# Patient Record
Sex: FEMALE | Race: WHITE | NOT HISPANIC OR LATINO | Employment: FULL TIME | URBAN - METROPOLITAN AREA
[De-identification: names, ages, dates, MRNs, and addresses within clinical notes are randomized per-mention and may not be internally consistent; named-entity substitution may affect disease eponyms.]

---

## 2017-01-11 ENCOUNTER — OFFICE VISIT (OUTPATIENT)
Dept: FAMILY MEDICINE | Facility: CLINIC | Age: 27
End: 2017-01-11
Payer: COMMERCIAL

## 2017-01-11 VITALS
HEART RATE: 70 BPM | HEIGHT: 67 IN | OXYGEN SATURATION: 100 % | DIASTOLIC BLOOD PRESSURE: 63 MMHG | WEIGHT: 157 LBS | TEMPERATURE: 97.7 F | BODY MASS INDEX: 24.64 KG/M2 | SYSTOLIC BLOOD PRESSURE: 111 MMHG

## 2017-01-11 DIAGNOSIS — M54.41 ACUTE BILATERAL LOW BACK PAIN WITH BILATERAL SCIATICA: Primary | ICD-10-CM

## 2017-01-11 DIAGNOSIS — M54.42 ACUTE BILATERAL LOW BACK PAIN WITH BILATERAL SCIATICA: Primary | ICD-10-CM

## 2017-01-11 PROBLEM — Z13.6 CARDIOVASCULAR SCREENING; LDL GOAL LESS THAN 160: Status: ACTIVE | Noted: 2017-01-11

## 2017-01-11 PROCEDURE — 99203 OFFICE O/P NEW LOW 30 MIN: CPT | Performed by: FAMILY MEDICINE

## 2017-01-11 RX ORDER — ESCITALOPRAM OXALATE 10 MG/1
10 TABLET ORAL DAILY
Refills: 1 | COMMUNITY
Start: 2016-11-18 | End: 2017-12-26

## 2017-01-11 RX ORDER — CYCLOBENZAPRINE HCL 10 MG
10 TABLET ORAL 3 TIMES DAILY PRN
Qty: 20 TABLET | Refills: 1 | Status: SHIPPED | OUTPATIENT
Start: 2017-01-11 | End: 2017-12-26

## 2017-01-11 RX ORDER — NAPROXEN 500 MG/1
500 TABLET ORAL 2 TIMES DAILY WITH MEALS
Qty: 30 TABLET | Refills: 1 | Status: SHIPPED | OUTPATIENT
Start: 2017-01-11 | End: 2017-12-26

## 2017-01-11 NOTE — NURSING NOTE
"Chief Complaint   Patient presents with     Back Pain     lower back pain        Initial /63 mmHg  Pulse 70  Temp(Src) 97.7  F (36.5  C) (Oral)  Ht 5' 7\" (1.702 m)  Wt 157 lb (71.215 kg)  BMI 24.58 kg/m2  SpO2 100%  LMP 01/06/2017 (Exact Date)  Breastfeeding? No Estimated body mass index is 24.58 kg/(m^2) as calculated from the following:    Height as of this encounter: 5' 7\" (1.702 m).    Weight as of this encounter: 157 lb (71.215 kg).  BP completed using cuff size: judy Ramirez      "

## 2017-01-11 NOTE — MR AVS SNAPSHOT
After Visit Summary   1/11/2017    Meredith Mello    MRN: [id number]           Patient Information     Date Of Birth          1990        Visit Information        Provider Department      1/11/2017 8:00 AM Rossana Ortiz MD [facility]        Today's Diagnoses     Acute bilateral low back pain with bilateral sciatica    -  1       Care Instructions    [facility]    If you have any questions regarding to your visit please contact your care team:       Team Red:   Clinic Hours Telephone Number   Dr. Rossana Bob, NP   7am-7pm  Monday - Thursday   7am-5pm  Fridays  [phone]  (Appointment scheduling available 24/7)    Questions about your visit?   Team Line  [phone]   Urgent Care - [place] and [facility] - 11am-9pm Monday-Friday Saturday-Sunday- 9am-5pm   [place] - 5pm-9pm Monday-Friday Saturday-Sunday- 9am-5pm  [phone] - [place]   [phone] - [place]       What options do I have for visits at the clinic other than the traditional office visit?  To expand how we care for you, many of our providers are utilizing electronic visits (e-visits) and telephone visits, when medically appropriate, for interactions with their patients rather than a visit in the clinic.   We also offer nurse visits for many medical concerns. Just like any other service, we will bill your insurance company for this type of visit based on time spent on the phone with your provider. Not all insurance companies cover these visits. Please check with your medical insurance if this type of visit is covered. You will be responsible for any charges that are not paid by your insurance.      E-visits via [employer]:  generally incur a $35.00 fee.  Telephone visits:  Time spent on the phone: *charged based on time that is spent on the phone in increments of 10 minutes. Estimated cost:   5-10 mins $30.00   11-20 mins. $59.00    21-30 mins. $85.00     Use [employer] (secure email communication and access to your chart) to send your primary care provider a message or make an appointment. Ask someone on your Team how to sign up for [employer].  For a Price Quote for your services, please call our Consumer Price Line at [phone].      As always, Thank you for trusting us with your health care needs!  Discharged by Zoey Cash MA.          Follow-ups after your visit        Additional Services     [facility] PT, HAND, AND CHIROPRACTIC REFERRAL       **This order will print in the [facility] Scheduling Office**    Physical Therapy, Hand Therapy and Chiropractic Care are available through:    *[facility]  *[facility]  *[facility]    Call one number to schedule at any of the above locations: [phone].    Your provider has referred you to: As Indicated:      Indication/Reason for Referral:    Onset of Illness:    Therapy Orders: Evaluate and Treat  Special Programs: None   Special Request: None    Parminder [place]      Additional Comments for the Therapist or Chiropractor:      Please be aware that coverage of these services is subject to the terms and limitations of your health insurance plan.  Call member services at your health plan with any benefit or coverage questions.      Please bring the following to your appointment:    *Your personal calendar for scheduling future appointments  *Comfortable clothing                  Follow-up notes from your care team     Return if symptoms worsen or fail to improve, for physical (fasting labs up to one week prior).      Who to contact     If you have questions or need follow up information about today's clinic visit or your schedule please contact [facility] directly at [phone].  Normal or non-critical lab and imaging results will be communicated to you by [employer], letter or phone within 4 business days after the clinic has received the  "results. If you do not hear from us within 7 days, please contact the clinic through [employer] or phone. If you have a critical or abnormal lab result, we will notify you by phone as soon as possible.  Submit refill requests through [employer] or call your pharmacy and they will forward the refill request to us. Please allow 3 business days for your refill to be completed.          Additional Information About Your Visit        [employer] Information     [employer] lets you send messages to your doctor, view your test results, renew your prescriptions, schedule appointments and more. To sign up, go to [email or web address] . Click on \"Log in\" on the left side of the screen, which will take you to the Welcome page. Then click on \"Sign up Now\" on the right side of the page.     You will be asked to enter the access code listed below, as well as some personal information. Please follow the directions to create your username and password.     Your access code is: [email or web address]  Expires: 2017  8:40 AM     Your access code will  in 90 days. If you need help or a new code, please call your [place] clinic or [phone].        Care EveryWhere ID     This is your Care EveryWhere ID. This could be used by other organizations to access your [place] medical records  [id number]        Your Vitals Were     Pulse Temperature Height    70 97.7  F (36.5  C) (Oral) 5' 7\" (1.702 m)    BMI (Body Mass Index) Pulse Oximetry Last Period    24.58 kg/m2 100% 2017 (Exact Date)    Breastfeeding?          No         Blood Pressure from Last 3 Encounters:   17 111/63    Weight from Last 3 Encounters:   17 157 lb (71.215 kg)              We Performed the Following     [facility] PT, HAND, AND CHIROPRACTIC REFERRAL          Today's Medication Changes          These changes are accurate as of: 17  8:40 AM.  If you have any questions, ask your nurse or doctor.               Start taking these medicines.        Dose/Directions "    cyclobenzaprine 10 MG tablet   Commonly known as:  FLEXERIL   Used for:  Acute bilateral low back pain with bilateral sciatica   Started by:  Rossana Ortiz MD        Dose:  10 mg   Take 1 tablet (10 mg) by mouth 3 times daily as needed for muscle spasms   Quantity:  20 tablet   Refills:  1       naproxen 500 MG tablet   Commonly known as:  NAPROSYN   Used for:  Acute bilateral low back pain with bilateral sciatica   Started by:  Rossana Ortiz MD        Dose:  500 mg   Take 1 tablet (500 mg) by mouth 2 times daily (with meals)   Quantity:  30 tablet   Refills:  1            Where to get your medicines      These medications were [facility] [place] [facility] [place] [facility] [address] [facility]  [address] [address]     Phone:  [phone]    - cyclobenzaprine 10 MG tablet  - naproxen 500 MG tablet             Primary Care Provider Office Phone # Fax #    Rossana Ortiz [phone]       [facility] [address] [place] [address]        Thank you!     Thank you for choosing [facility]  for your care. Our goal is always to provide you with excellent care. Hearing back from our patients is one way we can continue to improve our services. Please take a few minutes to complete the written survey that you may receive in the mail after your visit with us. Thank you!             Your Updated Medication List - Protect others around you: Learn how to safely use, store and throw away your medicines at www.disposemymeds.org.          This list is accurate as of: 1/11/17  8:40 AM.  Always use your most recent med list.                   Brand Name Dispense Instructions for use    cyclobenzaprine 10 MG tablet    FLEXERIL    20 tablet    Take 1 tablet (10 mg) by mouth 3 times daily as needed for muscle spasms       escitalopram 10 MG tablet    LEXAPRO     Take 10 mg by mouth daily       levonorgestrel 20 MCG/24HR IUD    MIRENA     1  each by Intrauterine route once       naproxen 500 MG tablet    NAPROSYN    30 tablet    Take 1 tablet (500 mg) by mouth 2 times daily (with meals)

## 2017-01-11 NOTE — PATIENT INSTRUCTIONS
[facility]    If you have any questions regarding to your visit please contact your care team:       Team Red:   Clinic Hours Telephone Number   Dr. Rossana Bob, NP   7am-7pm  Monday - Thursday   7am-5pm  Fridays  [phone]  (Appointment scheduling available 24/7)    Questions about your visit?   Team Line  [phone]   [facility] [place] [facility] 11am-9pm Monday-Friday Saturday-Sunday- 9am-5pm   [place] - 5pm-9pm Monday-Friday Saturday-Sunday- 9am-5pm  [phone] - [place]   [phone] - [place]       What options do I have for visits at the clinic other than the traditional office visit?  To expand how we care for you, many of our providers are utilizing electronic visits (e-visits) and telephone visits, when medically appropriate, for interactions with their patients rather than a visit in the clinic.   We also offer nurse visits for many medical concerns. Just like any other service, we will bill your insurance company for this type of visit based on time spent on the phone with your provider. Not all insurance companies cover these visits. Please check with your medical insurance if this type of visit is covered. You will be responsible for any charges that are not paid by your insurance.      E-visits via [employer]:  generally incur a $35.00 fee.  Telephone visits:  Time spent on the phone: *charged based on time that is spent on the phone in increments of 10 minutes. Estimated cost:   5-10 mins $30.00   11-20 mins. $59.00   21-30 mins. $85.00     Use [email or web address] (secure email communication and access to your chart) to send your primary care provider a message or make an appointment. Ask someone on your Team how to sign up for [employer].  For a Price Quote for your services, please call our Consumer Price Line at [phone].      As always, Thank you for trusting us with your health care needs!  Discharged  by Zoey Cash MA.

## 2017-01-11 NOTE — PROGRESS NOTES
"  SUBJECTIVE:                                                    Meredith Mello is a 26 year old female who presents to clinic today for the following health issues:    Back Pain      Duration: x 2 weeks         Specific cause: none    Description:   Location of pain: low back bilateral, near the base of the spine   Character of pain: sharp, stabbing and intermittent  Pain radiation:radiates into the right buttocks, radiates into the right leg, radiates into the right foot, radiates into the left buttocks, radiates into the left leg to knees   New numbness or weakness in legs, not attributed to pain:  no     Intensity: Currently 2/10    History:   Pain interferes with job: No  History of back problems: no prior back problems  Any previous MRI or X-rays: None  Sees a specialist for back pain:  No  Therapies tried without relief: ibuprofen, tylenol, massage, and stretching     Alleviating factors:   Improved by: none      Precipitating factors:  Worsened by: Lifting, Bending and Sitting    Functional and Psychosocial Screen (Parminder STarT Back):      Not performed today   Accompanying Signs & Symptoms:  Risk of Fracture:  None  Risk of Cauda Equina:  None  Risk of Infection:  None  Risk of Cancer:  None  Risk of Ankylosing Spondylitis:  Onset at age <35, male, AND morning back stiffness. no                  I have reviewed the patient's medical history in detail and updated the computerized patient record.     ROS:  C: NEGATIVE for fever, chills, change in weight  MUSCULOSKELETAL: see HPI   N: NEGATIVE for weakness, dizziness or paresthesias    OBJECTIVE:                                                    /63 mmHg  Pulse 70  Temp(Src) 97.7  F (36.5  C) (Oral)  Ht 5' 7\" (1.702 m)  Wt 157 lb (71.215 kg)  BMI 24.58 kg/m2  SpO2 100%  LMP 01/06/2017 (Exact Date)  Breastfeeding? No  Body mass index is 24.58 kg/(m^2).  GENERAL: healthy, alert and no distress  RESP: lungs clear to auscultation - no rales, rhonchi " or wheezes  CV: regular rate and rhythm, normal S1 S2, no S3 or S4, no murmur, click or rub, no peripheral edema and peripheral pulses strong  MS: no gross musculoskeletal defects noted, no edema  NEURO: Normal strength and tone, mentation intact and speech normal  PSYCH: mentation appears normal, affect normal/bright    Diagnostic Test Results:  none      ASSESSMENT/PLAN:                                                    (M54.42,  M54.41) Acute bilateral low back pain with bilateral sciatica  (primary encounter diagnosis)  Comment: suspect musculoligamentous cause   Plan: naproxen (NAPROSYN) 500 MG tablet,         cyclobenzaprine (FLEXERIL) 10 MG tablet, [facility]         PT, HAND, AND CHIROPRACTIC REFERRAL        Over the counter pain medication as needed, ice or heat as she finds helpful, avoidance of aggravating activities, and return for persistence for consideration of further imaging or referral.       See Patient Instructions    Rossana Ortiz MD  [facility]

## 2017-01-14 ENCOUNTER — THERAPY VISIT (OUTPATIENT)
Dept: PHYSICAL THERAPY | Facility: CLINIC | Age: 27
End: 2017-01-14
Payer: COMMERCIAL

## 2017-01-14 DIAGNOSIS — M54.41 ACUTE BILATERAL LOW BACK PAIN WITH RIGHT-SIDED SCIATICA: Primary | ICD-10-CM

## 2017-01-14 PROCEDURE — 97110 THERAPEUTIC EXERCISES: CPT | Mod: GP | Performed by: PHYSICAL THERAPIST

## 2017-01-14 PROCEDURE — 97161 PT EVAL LOW COMPLEX 20 MIN: CPT | Mod: GP | Performed by: PHYSICAL THERAPIST

## 2017-01-14 PROCEDURE — 97140 MANUAL THERAPY 1/> REGIONS: CPT | Mod: GP | Performed by: PHYSICAL THERAPIST

## 2017-01-14 NOTE — PROGRESS NOTES
[facility] Initial Evaluation  Subjective:    Meredith Mello is a 26 year old female with a lumbar condition.  Condition occurred with:  Insidious onset.  Condition occurred: for unknown reasons.  This is a new condition  Pain started Dec 28th. Drove 5 hours 2x over the course of a week prior to this and had noticed some discomfort during 2nd drive..    Patient reports pain:  Lower lumbar spine.  Radiates to:  Gluteals right, gluteals left, thigh right and thigh left.  Pain is described as sharp and is intermittent and reported as 3/10 and 7/10 (3/10 current; 7/10 after prolonged sitting).  Associated symptoms:  Loss of motion/stiffness. Pain is the same all the time.  Symptoms are exacerbated by bending and sitting and relieved by activity/movement and other (standing and walking around; no relief from NSAIDs, muscle relaxers, massager, or analgesics).  Since onset symptoms are unchanged.        General health as reported by patient is good.  Pertinent medical history includes:  Depression and migraines.  Medical allergies: no.  Other surgeries include:  No.    Current occupation is Music Therapist.  Patient is working in normal job without restrictions.  Primary job tasks include:  Prolonged sitting, prolonged standing and other (computer work; Driving).    Barriers include:  None as reported by the patient.    Red flags:  None as reported by the patient.                      Objective:    System         Lumbar/SI Evaluation  ROM:    AROM Lumbar:   Flexion:            To floor  Ext:                    Mod limitation +pain (most of motion through upper lumbar/lower thoracic); Min limitation ++pain when cued to extend through lower lumbar   Side Bend:        Left:  To knee    Right:  To knee  Rotation:           Left:  WNL    Right:  WNL  Side Glide:        Left:     Right:           Lumbar Myotomes:  Lumbar myotomes: WNL tested in sitting; (-) Toe walking, (-) Heel  "walking.                  Neural Tension/Mobility:  Neural tension wnl lumbar: (?) Slump- cervical flex increases low back sx when \"slumped\", no changed w/ Knee ext/DF.                SI joint/Sacrum:    (+) Gillet test R  (+) Active SLR R  (-) DOUGLAS B  (+) Torsion R    After MET:   (-) Gillet test  (-) Active SLR  (-) Torsion  Lumbar ext: Min limitation w/ extension through full lumbar spine, minimal pain decrease                                                     After repeated lumbar ext in prone: Ext WNL w/ minimal to no pain    General     ROS    Assessment/Plan:      Patient is a 26 year old female with lumbar complaints.    Patient has the following significant findings with corresponding treatment plan.                Diagnosis 1:  LBP  Pain -  manual therapy, self management, education, directional preference exercise and home program  Decreased ROM/flexibility - manual therapy, therapeutic exercise, therapeutic activity and home program  Decreased joint mobility - manual therapy, therapeutic exercise, therapeutic activity and home program  Decreased strength - therapeutic exercise, therapeutic activities and home program  Impaired muscle performance - neuro re-education and home program  Decreased function - therapeutic activities and home program    Therapy Evaluation Codes:   1) History comprised of:   Personal factors that impact the plan of care:      Profession.    Comorbidity factors that impact the plan of care are:      None.     Medications impacting care: None.  2) Examination of Body Systems comprised of:   Body structures and functions that impact the plan of care:      Lumbar spine.   Activity limitations that impact the plan of care are:      Driving, Reading/Computer work and Sitting.  3) Clinical presentation characteristics are:   Stable/Uncomplicated.  4) Decision-Making    Low complexity using standardized patient assessment instrument and/or measureable assessment of functional " outcome.  Cumulative Therapy Evaluation is: Low complexity.    Previous and current functional limitations:  (See Goal Flow Sheet for this information)    Short term and Long term goals: (See Goal Flow Sheet for this information)     Communication ability:  Patient appears to be able to clearly communicate and understand verbal and written communication and follow directions correctly.  Treatment Explanation - The following has been discussed with the patient:   RX ordered/plan of care  Anticipated outcomes  Possible risks and side effects  This patient would benefit from PT intervention to resume normal activities.   Rehab potential is excellent.    Frequency:  2 X month, once daily  Duration:  for 1-2 months  Discharge Plan:  Achieve all LTG.  Independent in home treatment program.  Reach maximal therapeutic benefit.    Please refer to the daily flowsheet for treatment today, total treatment time and time spent performing 1:1 timed codes.

## 2017-01-20 ENCOUNTER — OFFICE VISIT (OUTPATIENT)
Dept: FAMILY MEDICINE | Facility: CLINIC | Age: 27
End: 2017-01-20
Payer: COMMERCIAL

## 2017-01-20 VITALS
OXYGEN SATURATION: 100 % | RESPIRATION RATE: 14 BRPM | HEIGHT: 67 IN | TEMPERATURE: 96.8 F | DIASTOLIC BLOOD PRESSURE: 60 MMHG | HEART RATE: 90 BPM | WEIGHT: 150 LBS | SYSTOLIC BLOOD PRESSURE: 104 MMHG | BODY MASS INDEX: 23.54 KG/M2

## 2017-01-20 DIAGNOSIS — R82.90 NONSPECIFIC FINDING ON EXAMINATION OF URINE: ICD-10-CM

## 2017-01-20 DIAGNOSIS — R30.0 DYSURIA: ICD-10-CM

## 2017-01-20 DIAGNOSIS — R31.9 URINARY TRACT INFECTION WITH HEMATURIA, SITE UNSPECIFIED: Primary | ICD-10-CM

## 2017-01-20 DIAGNOSIS — N39.0 URINARY TRACT INFECTION WITH HEMATURIA, SITE UNSPECIFIED: Primary | ICD-10-CM

## 2017-01-20 LAB
ALBUMIN UR-MCNC: 100 MG/DL
APPEARANCE UR: CLEAR
BACTERIA #/AREA URNS HPF: ABNORMAL /HPF
BILIRUB UR QL STRIP: NEGATIVE
COLOR UR AUTO: YELLOW
GLUCOSE UR STRIP-MCNC: NEGATIVE MG/DL
HGB UR QL STRIP: ABNORMAL
KETONES UR STRIP-MCNC: 15 MG/DL
LEUKOCYTE ESTERASE UR QL STRIP: ABNORMAL
NITRATE UR QL: POSITIVE
NON-SQ EPI CELLS #/AREA URNS LPF: ABNORMAL /LPF
PH UR STRIP: 6 PH (ref 5–7)
RBC #/AREA URNS AUTO: ABNORMAL /HPF (ref 0–2)
SP GR UR STRIP: >1.03 (ref 1–1.03)
URN SPEC COLLECT METH UR: ABNORMAL
UROBILINOGEN UR STRIP-ACNC: 0.2 EU/DL (ref 0.2–1)
WBC #/AREA URNS AUTO: ABNORMAL /HPF (ref 0–2)
WBC CLUMPS #/AREA URNS HPF: PRESENT /HPF

## 2017-01-20 PROCEDURE — 87186 SC STD MICRODIL/AGAR DIL: CPT | Performed by: FAMILY MEDICINE

## 2017-01-20 PROCEDURE — 87086 URINE CULTURE/COLONY COUNT: CPT | Performed by: FAMILY MEDICINE

## 2017-01-20 PROCEDURE — 81001 URINALYSIS AUTO W/SCOPE: CPT | Performed by: FAMILY MEDICINE

## 2017-01-20 PROCEDURE — 99213 OFFICE O/P EST LOW 20 MIN: CPT | Performed by: FAMILY MEDICINE

## 2017-01-20 PROCEDURE — 87088 URINE BACTERIA CULTURE: CPT | Performed by: FAMILY MEDICINE

## 2017-01-20 RX ORDER — CIPROFLOXACIN 500 MG/1
500 TABLET, FILM COATED ORAL 2 TIMES DAILY
Qty: 14 TABLET | Refills: 0 | Status: SHIPPED | OUTPATIENT
Start: 2017-01-20 | End: 2017-06-14

## 2017-01-20 ASSESSMENT — PAIN SCALES - GENERAL: PAINLEVEL: NO PAIN (0)

## 2017-01-20 NOTE — PROGRESS NOTES
"  SUBJECTIVE:                                                    Meredith Mello is a 26 year old female who presents to clinic today for the following health issues:    URINARY TRACT SYMPTOMS      Duration: 2 weeks    Description  dysuria, frequency, urgency, odor, nocturia x 2, hesitancy, retention and back pain    Intensity:  moderate, severe    Accompanying signs and symptoms:  Fever/chills: YES  Flank pain YES  Nausea and vomiting: no   Vaginal symptoms: none  Abdominal/Pelvic Pain: YES    History  History of frequent UTI's: YES  History of kidney stones: no   Sexually Active: YES  Possibility of pregnancy: No    Precipitating or alleviating factors: None    Therapies tried and outcome: none   Outcome: none       Problem list and histories reviewed & adjusted, as indicated.  Additional history: as documented    Patient Active Problem List   Diagnosis     CARDIOVASCULAR SCREENING; LDL GOAL LESS THAN 160     SHERRI (generalized anxiety disorder)     Past Surgical History   Procedure Laterality Date     No history of surgery         Social History   Substance Use Topics     Smoking status: Never Smoker      Smokeless tobacco: Not on file     Alcohol Use: Yes     History reviewed. No pertinent family history.      ROS:  Constitutional, HEENT, cardiovascular, pulmonary and gi systems are negative, except as otherwise noted.    OBJECTIVE:                                                    /60 mmHg  Pulse 90  Temp(Src) 96.8  F (36  C) (Oral)  Resp 14  Ht 5' 7\" (1.702 m)  Wt 150 lb (68.04 kg)  BMI 23.49 kg/m2  SpO2 100%  LMP 01/06/2017  Breastfeeding? No  Body mass index is 23.49 kg/(m^2).  GENERAL: healthy, alert and no distress  HENT: ear canals and TM's normal, nose and mouth without ulcers or lesions  NECK: no adenopathy and thyroid normal to palpation  RESP: lungs clear to auscultation - no rales, rhonchi or wheezes  CV: regular rate and rhythm, no murmur, click or rub  ABDOMEN: Vague suprapubic " tenderness  MS: no gross musculoskeletal defects noted, no edema  BACK: No CVA tenderness  Diagnostic Test Results:     Results for orders placed or performed in visit on 01/20/17   *UA reflex to Microscopic and Culture ([facility] and [facility] (except [place] and [place])   Result Value Ref Range    Color Urine Yellow     Appearance Urine Clear     Glucose Urine Negative NEG mg/dL    Bilirubin Urine Negative NEG    Ketones Urine 15 (A) NEG mg/dL    Specific Gravity Urine >1.030 1.003 - 1.035    Blood Urine Large (A) NEG    pH Urine 6.0 5.0 - 7.0 pH    Protein Albumin Urine 100 (A) NEG mg/dL    Urobilinogen Urine 0.2 0.2 - 1.0 EU/dL    Nitrite Urine Positive (A) NEG    Leukocyte Esterase Urine Moderate (A) NEG    Source Midstream Urine    Urine Microscopic   Result Value Ref Range    WBC Urine  (A) 0 - 2 /HPF    RBC Urine 2-5 (A) 0 - 2 /HPF    WBC Clumps Present (A) NEG /HPF    Squamous Epithelial /LPF Urine Few FEW /LPF    Bacteria Urine Moderate (A) NEG /HPF          ASSESSMENT/PLAN:                                                    (N39.0,  R31.9) Urinary tract infection with hematuria, site unspecified  (primary encounter diagnosis)  Comment: UA consistent with UTI. Antibiotic treatment and preventive measures discussed.  Plan: ciprofloxacin (CIPRO) 500 MG tablet    (R30.0) Dysuria  Comment: UA consistent with UTI  Plan: *UA reflex to Microscopic and Culture         ([facility] and [facility] (except         [facility]), Urine Microscopic    (R82.90) Nonspecific finding on examination of urine  Plan: Urine Culture Aerobic Bacterial    Call or return to clinic prn if these symptoms worsen or fail to improve as anticipated 1 week.    Carter Bernabe MD  [facility]

## 2017-01-20 NOTE — NURSING NOTE
"Chief Complaint   Patient presents with     UTI       Initial /60 mmHg  Pulse 90  Temp(Src) 96.8  F (36  C) (Oral)  Resp 14  Ht 5' 7\" (1.702 m)  Wt 150 lb (68.04 kg)  BMI 23.49 kg/m2  SpO2 100%  LMP 01/06/2017  Breastfeeding? No Estimated body mass index is 23.49 kg/(m^2) as calculated from the following:    Height as of this encounter: 5' 7\" (1.702 m).    Weight as of this encounter: 150 lb (68.04 kg).  BP completed using cuff size: judy Hurley CMA      "

## 2017-01-20 NOTE — MR AVS SNAPSHOT
After Visit Summary   1/20/2017    Meredith Mello    MRN: [id number]           Patient Information     Date Of Birth          1990        Visit Information        Provider Department      1/20/2017 2:20 PM Carter Bernabe MD [facility]        Today's Diagnoses     Urinary tract infection with hematuria, site unspecified    -  1     Dysuria         Nonspecific finding on examination of urine           Care Instructions      Urinary Tract Infections in Women  Urinary tract infections (UTIs) are most often caused by bacteria (germs). These bacteria enter the urinary tract. The bacteria may come from outside the body. Or they may travel from the skin outside the rectum or vagina into the urethra. Female anatomy makes it easier for bacteria from the bowel to enter a woman s urinary tract, which is the most common source of UTI. This means women develop UTIs more often than men. Pain in or around the urinary tract is a common UTI symptom. But the only way to know for sure if you have a UTI for the health care provider to test your urine. The two tests that may be done are the urinalysis and urine culture.  Types of UTIs    Cystitis: A bladder infection (cystitis) is the most common UTI in women. You may have urgent or frequent urination. You may also have pain, burning when you urinate, and bloody urine.    Urethritis: This is an inflamed urethra, which is the tube that carries urine from the bladder to outside the body. You may have lower stomach or back pain. You may also have urgent or frequent urination.    Pyelonephritis: This is a kidney infection. If not treated, it can be serious and damage your kidneys. In severe cases, you may be hospitalized. You may have a fever and lower back pain.  Medications to treat a UTI  Most UTIs are treated with antibiotics. These kill the bacteria. The length of time you need to take them depends on the type of infection. It may be as short as  3 days. If you have repeated UTIs, a low-dose antibiotic may be needed for several months. Take antibiotics exactly as directed. Don t stop taking them until all of the medication is gone. If you stop taking the antibiotic too soon, the infection may not go away, and you may develop a resistance to the antibiotic. This can make it much harder to treat.  Lifestyle changes to treat and prevent UTIs  The lifestyle changes below will help get rid of your UTI. They may also help prevent future UTIs.    Drink plenty of fluids. This includes water, juice, or other caffeine-free drinks. Fluids help flush bacteria out of your body.    Empty your bladder. Always empty your bladder when you feel the urge to urinate. And always urinate before going to sleep. Urine that stays in your bladder can lead to infection. Try to urinate before and after sex as well.    Practice good personal hygiene. Wipe yourself from front to back after using the toilet. This helps keep bacteria from getting into the urethra.    Use condoms during sex. These help prevent UTIs caused by sexually transmitted bacteria. Also, avoid using spermicides during sex. These can increase the risk of UTIs. Choose other forms of birth control instead. For women who tend to get UTIs after sex, a low-dose of a preventive antibiotic may be used. Be sure to discuss this option with your health care provider.    Follow up with your health care provider as directed. He or she may test to make sure the infection has cleared. If necessary, additional treatment may be started.    5709-6777 [employer]. [address] [place] [address], [place] [address]. All rights reserved. This information is not intended as a substitute for professional medical care. Always follow your healthcare professional's instructions.        Urinary Tract Infections in Women  Urinary tract infections (UTIs) are most often caused by bacteria (germs). These bacteria enter the urinary tract. The  bacteria may come from outside the body. Or they may travel from the skin outside the rectum or vagina into the urethra. Female anatomy makes it easier for bacteria from the bowel to enter a woman s urinary tract, which is the most common source of UTI. This means women develop UTIs more often than men. Pain in or around the urinary tract is a common UTI symptom. But the only way to know for sure if you have a UTI for the health care provider to test your urine. The two tests that may be done are the urinalysis and urine culture.  Types of UTIs    Cystitis: A bladder infection (cystitis) is the most common UTI in women. You may have urgent or frequent urination. You may also have pain, burning when you urinate, and bloody urine.    Urethritis: This is an inflamed urethra, which is the tube that carries urine from the bladder to outside the body. You may have lower stomach or back pain. You may also have urgent or frequent urination.    Pyelonephritis: This is a kidney infection. If not treated, it can be serious and damage your kidneys. In severe cases, you may be hospitalized. You may have a fever and lower back pain.  Medications to treat a UTI  Most UTIs are treated with antibiotics. These kill the bacteria. The length of time you need to take them depends on the type of infection. It may be as short as 3 days. If you have repeated UTIs, a low-dose antibiotic may be needed for several months. Take antibiotics exactly as directed. Don t stop taking them until all of the medication is gone. If you stop taking the antibiotic too soon, the infection may not go away, and you may develop a resistance to the antibiotic. This can make it much harder to treat.  Lifestyle changes to treat and prevent UTIs  The lifestyle changes below will help get rid of your UTI. They may also help prevent future UTIs.    Drink plenty of fluids. This includes water, juice, or other caffeine-free drinks. Fluids help flush bacteria out of  your body.    Empty your bladder. Always empty your bladder when you feel the urge to urinate. And always urinate before going to sleep. Urine that stays in your bladder can lead to infection. Try to urinate before and after sex as well.    Practice good personal hygiene. Wipe yourself from front to back after using the toilet. This helps keep bacteria from getting into the urethra.    Use condoms during sex. These help prevent UTIs caused by sexually transmitted bacteria. Also, avoid using spermicides during sex. These can increase the risk of UTIs. Choose other forms of birth control instead. For women who tend to get UTIs after sex, a low-dose of a preventive antibiotic may be used. Be sure to discuss this option with your health care provider.    Follow up with your health care provider as directed. He or she may test to make sure the infection has cleared. If necessary, additional treatment may be started.    6062-3013 [employer]. [address] [place] [address]. All rights reserved. This information is not intended as a substitute for professional medical care. Always follow your healthcare professional's instructions.      [facility]    If you have any questions regarding to your visit please contact your care team:       Team Purple:   Clinic Hours Telephone Number   [address] Dr.   7am-7pm  Monday - Thursday   7am-5pm  Fridays  [phone]  (Appointment scheduling available 24/7)    Questions about your Visit?   Team Line:  [phone]   Urgent Care - [place] - 11am-9pm Monday-Friday Saturday-Sunday- 9am-5pm   [place] - 5pm-9pm Monday-Friday Saturday-Sunday- 9am-5pm  [phone] - [place]   [phone] - [place]       What options do I have for visits at the clinic other than the traditional office visit?  To expand how we care for you, many of our providers are  utilizing electronic visits (e-visits) and telephone visits, when medically appropriate, for interactions with their patients rather than a visit in the clinic.   We also offer nurse visits for many medical concerns. Just like any other service, we will bill your insurance company for this type of visit based on time spent on the phone with your provider. Not all insurance companies cover these visits. Please check with your medical insurance if this type of visit is covered. You will be responsible for any charges that are not paid by your insurance.      E-visits via TakWakhart:  generally incur a $35.00 fee.  Telephone visits:  Time spent on the phone: *charged based on time that is spent on the phone in increments of 10 minutes. Estimated cost:   5-10 mins $30.00   11-20 mins. $59.00   21-30 mins. $85.00     Use Knip (secure email communication and access to your chart) to send your primary care provider a message or make an appointment. Ask someone on your Team how to sign up for Knip.  For a Price Quote for your services, please call our OnetoOnetext Line at [phone].  As always, Thank you for trusting us with your health care needs!    Discharged by Alexia Hooker CMA          Follow-ups after your visit        Your next 10 appointments already scheduled     Jan 28, 2017  8:40 AM   [facility] with Amanda K Hilligoss, PT   [facility] [place] [facility] (SANDHYA FSANN JUNE)    [address]  [address]  [place] [address] [phone]              Who to contact     If you have questions or need follow up information about today's clinic visit or your schedule please contact [facility] directly at [phone].  Normal or non-critical lab and imaging results will be communicated to you by MyChart, letter or phone within 4 business days after the clinic has received the results. If you do not hear from us within 7 days, please contact the clinic through Snapteet or  "phone. If you have a critical or abnormal lab result, we will notify you by phone as soon as possible.  Submit refill requests through edupristine or call your pharmacy and they will forward the refill request to us. Please allow 3 business days for your refill to be completed.          Additional Information About Your Visit        [employer] Information     edupristine lets you send messages to your doctor, view your test results, renew your prescriptions, schedule appointments and more. To sign up, go to [email or web address] . Click on \"Log in\" on the left side of the screen, which will take you to the Welcome page. Then click on \"Sign up Now\" on the right side of the page.     You will be asked to enter the access code listed below, as well as some personal information. Please follow the directions to create your username and password.     Your access code is: [email or web address]  Expires: 2017  8:40 AM     Your access code will  in 90 days. If you need help or a new code, please call your [place] clinic or [phone].        Care EveryWhere ID     This is your Care EveryWhere ID. This could be used by other organizations to access your [place] medical records  [id number]        Your Vitals Were     Pulse Temperature Respirations    90 96.8  F (36  C) (Oral) 14    Height BMI (Body Mass Index) Pulse Oximetry    5' 7\" (1.702 m) 23.49 kg/m2 100%    Last Period Breastfeeding?       2017 No        Blood Pressure from Last 3 Encounters:   17 104/60   17 111/63    Weight from Last 3 Encounters:   17 150 lb (68.04 kg)   17 157 lb (71.215 kg)              We Performed the Following     *UA reflex to Microscopic and Culture ([facility] and [facility] (except [place] and [place])     Urine Culture Aerobic Bacterial     Urine Microscopic        Primary Care Provider Office Phone # Fax #    Rossana Ortiz [phone]       [facility] [address] [facility] [address]        Thank you!     Thank you for choosing [facility]  for your care. Our goal is always to provide you with excellent care. Hearing back from our patients is one way we can continue to improve our services. Please take a few minutes to complete the written survey that you may receive in the mail after your visit with us. Thank you!             Your Updated Medication List - Protect others around you: Learn how to safely use, store and throw away your medicines at www.disposemymeds.org.          This list is accurate as of: 1/20/17  3:02 PM.  Always use your most recent med list.                   Brand Name Dispense Instructions for use    cyclobenzaprine 10 MG tablet    FLEXERIL    20 tablet    Take 1 tablet (10 mg) by mouth 3 times daily as needed for muscle spasms       escitalopram 10 MG tablet    LEXAPRO     Take 10 mg by mouth daily       levonorgestrel 20 MCG/24HR IUD    MIRENA     1 each by Intrauterine route once       naproxen 500 MG tablet    NAPROSYN    30 tablet    Take 1 tablet (500 mg) by mouth 2 times daily (with meals)

## 2017-01-20 NOTE — PATIENT INSTRUCTIONS
Urinary Tract Infections in Women  Urinary tract infections (UTIs) are most often caused by bacteria (germs). These bacteria enter the urinary tract. The bacteria may come from outside the body. Or they may travel from the skin outside the rectum or vagina into the urethra. Female anatomy makes it easier for bacteria from the bowel to enter a woman s urinary tract, which is the most common source of UTI. This means women develop UTIs more often than men. Pain in or around the urinary tract is a common UTI symptom. But the only way to know for sure if you have a UTI for the health care provider to test your urine. The two tests that may be done are the urinalysis and urine culture.  Types of UTIs    Cystitis: A bladder infection (cystitis) is the most common UTI in women. You may have urgent or frequent urination. You may also have pain, burning when you urinate, and bloody urine.    Urethritis: This is an inflamed urethra, which is the tube that carries urine from the bladder to outside the body. You may have lower stomach or back pain. You may also have urgent or frequent urination.    Pyelonephritis: This is a kidney infection. If not treated, it can be serious and damage your kidneys. In severe cases, you may be hospitalized. You may have a fever and lower back pain.  Medications to treat a UTI  Most UTIs are treated with antibiotics. These kill the bacteria. The length of time you need to take them depends on the type of infection. It may be as short as 3 days. If you have repeated UTIs, a low-dose antibiotic may be needed for several months. Take antibiotics exactly as directed. Don t stop taking them until all of the medication is gone. If you stop taking the antibiotic too soon, the infection may not go away, and you may develop a resistance to the antibiotic. This can make it much harder to treat.  Lifestyle changes to treat and prevent UTIs  The lifestyle changes below will help get rid of your UTI. They  may also help prevent future UTIs.    Drink plenty of fluids. This includes water, juice, or other caffeine-free drinks. Fluids help flush bacteria out of your body.    Empty your bladder. Always empty your bladder when you feel the urge to urinate. And always urinate before going to sleep. Urine that stays in your bladder can lead to infection. Try to urinate before and after sex as well.    Practice good personal hygiene. Wipe yourself from front to back after using the toilet. This helps keep bacteria from getting into the urethra.    Use condoms during sex. These help prevent UTIs caused by sexually transmitted bacteria. Also, avoid using spermicides during sex. These can increase the risk of UTIs. Choose other forms of birth control instead. For women who tend to get UTIs after sex, a low-dose of a preventive antibiotic may be used. Be sure to discuss this option with your health care provider.    Follow up with your health care provider as directed. He or she may test to make sure the infection has cleared. If necessary, additional treatment may be started.    2244-1283 [employer] [address] [place], [place] [address]. All rights reserved. This information is not intended as a substitute for professional medical care. Always follow your healthcare professional's instructions.        Urinary Tract Infections in Women  Urinary tract infections (UTIs) are most often caused by bacteria (germs). These bacteria enter the urinary tract. The bacteria may come from outside the body. Or they may travel from the skin outside the rectum or vagina into the urethra. Female anatomy makes it easier for bacteria from the bowel to enter a woman s urinary tract, which is the most common source of UTI. This means women develop UTIs more often than men. Pain in or around the urinary tract is a common UTI symptom. But the only way to know for sure if you have a UTI for the health care provider to test your  urine. The two tests that may be done are the urinalysis and urine culture.  Types of UTIs    Cystitis: A bladder infection (cystitis) is the most common UTI in women. You may have urgent or frequent urination. You may also have pain, burning when you urinate, and bloody urine.    Urethritis: This is an inflamed urethra, which is the tube that carries urine from the bladder to outside the body. You may have lower stomach or back pain. You may also have urgent or frequent urination.    Pyelonephritis: This is a kidney infection. If not treated, it can be serious and damage your kidneys. In severe cases, you may be hospitalized. You may have a fever and lower back pain.  Medications to treat a UTI  Most UTIs are treated with antibiotics. These kill the bacteria. The length of time you need to take them depends on the type of infection. It may be as short as 3 days. If you have repeated UTIs, a low-dose antibiotic may be needed for several months. Take antibiotics exactly as directed. Don t stop taking them until all of the medication is gone. If you stop taking the antibiotic too soon, the infection may not go away, and you may develop a resistance to the antibiotic. This can make it much harder to treat.  Lifestyle changes to treat and prevent UTIs  The lifestyle changes below will help get rid of your UTI. They may also help prevent future UTIs.    Drink plenty of fluids. This includes water, juice, or other caffeine-free drinks. Fluids help flush bacteria out of your body.    Empty your bladder. Always empty your bladder when you feel the urge to urinate. And always urinate before going to sleep. Urine that stays in your bladder can lead to infection. Try to urinate before and after sex as well.    Practice good personal hygiene. Wipe yourself from front to back after using the toilet. This helps keep bacteria from getting into the urethra.    Use condoms during sex. These help prevent UTIs caused by sexually  transmitted bacteria. Also, avoid using spermicides during sex. These can increase the risk of UTIs. Choose other forms of birth control instead. For women who tend to get UTIs after sex, a low-dose of a preventive antibiotic may be used. Be sure to discuss this option with your health care provider.    Follow up with your health care provider as directed. He or she may test to make sure the infection has cleared. If necessary, additional treatment may be started.    4997-6693 [employer]. [address] [place] [address]. All rights reserved. This information is not intended as a substitute for professional medical care. Always follow your healthcare professional's instructions.      [facility]    If you have any questions regarding to your visit please contact your care team:       Team Purple:   Clinic Hours Telephone Number   [address] Dr.   7am-7pm  Monday - Thursday   7am-5pm  Fridays  [phone]  (Appointment scheduling available 24/7)    Questions about your Visit?   Team Line:  [phone]   Urgent Care - [place] [facility] - 11am-9pm Monday-Friday Saturday-Sunday- 9am-5pm   [place] - 5pm-9pm Monday-Friday Saturday-Sunday- 9am-5pm  [phone] - [place]   [phone] - [place]       What options do I have for visits at the clinic other than the traditional office visit?  To expand how we care for you, many of our providers are utilizing electronic visits (e-visits) and telephone visits, when medically appropriate, for interactions with their patients rather than a visit in the clinic.   We also offer nurse visits for many medical concerns. Just like any other service, we will bill your insurance company for this type of visit based on time spent on the phone with your provider. Not all insurance companies cover these visits. Please check with your medical insurance if this type  of visit is covered. You will be responsible for any charges that are not paid by your insurance.      E-visits via NearWoohart:  generally incur a $35.00 fee.  Telephone visits:  Time spent on the phone: *charged based on time that is spent on the phone in increments of 10 minutes. Estimated cost:   5-10 mins $30.00   11-20 mins. $59.00   21-30 mins. $85.00     Use Business Lab (secure email communication and access to your chart) to send your primary care provider a message or make an appointment. Ask someone on your Team how to sign up for Business Lab.  For a Price Quote for your services, please call our Consumer Price Line at [phone].  As always, Thank you for trusting us with your health care needs!    Discharged by Alexia Hooker CMA

## 2017-01-22 LAB
BACTERIA SPEC CULT: ABNORMAL
MICRO REPORT STATUS: ABNORMAL
MICROORGANISM SPEC CULT: ABNORMAL
SPECIMEN SOURCE: ABNORMAL

## 2017-03-21 NOTE — PROGRESS NOTES
Pt last seen in PT 01/14.  She was no show on 01/28 and no further appts are scheduled.  Consider note dated 01/14 to serve as final summary.

## 2017-06-14 ENCOUNTER — OFFICE VISIT (OUTPATIENT)
Dept: FAMILY MEDICINE | Facility: CLINIC | Age: 27
End: 2017-06-14
Payer: COMMERCIAL

## 2017-06-14 VITALS
RESPIRATION RATE: 21 BRPM | OXYGEN SATURATION: 100 % | HEIGHT: 67 IN | BODY MASS INDEX: 24.39 KG/M2 | WEIGHT: 155.4 LBS | TEMPERATURE: 97.1 F | SYSTOLIC BLOOD PRESSURE: 105 MMHG | DIASTOLIC BLOOD PRESSURE: 61 MMHG | HEART RATE: 60 BPM

## 2017-06-14 DIAGNOSIS — N30.00 ACUTE CYSTITIS WITHOUT HEMATURIA: Primary | ICD-10-CM

## 2017-06-14 LAB
ALBUMIN UR-MCNC: NEGATIVE MG/DL
APPEARANCE UR: ABNORMAL
BILIRUB UR QL STRIP: NEGATIVE
COLOR UR AUTO: YELLOW
GLUCOSE UR STRIP-MCNC: NEGATIVE MG/DL
HGB UR QL STRIP: ABNORMAL
KETONES UR STRIP-MCNC: NEGATIVE MG/DL
LEUKOCYTE ESTERASE UR QL STRIP: ABNORMAL
MICRO REPORT STATUS: NORMAL
NITRATE UR QL: NEGATIVE
NON-SQ EPI CELLS #/AREA URNS LPF: ABNORMAL /LPF
PH UR STRIP: 6 PH (ref 5–7)
RBC #/AREA URNS AUTO: ABNORMAL /HPF (ref 0–2)
SP GR UR STRIP: <=1.005 (ref 1–1.03)
SPECIMEN SOURCE: NORMAL
URN SPEC COLLECT METH UR: ABNORMAL
UROBILINOGEN UR STRIP-ACNC: 0.2 EU/DL (ref 0.2–1)
WBC #/AREA URNS AUTO: ABNORMAL /HPF (ref 0–2)
WET PREP SPEC: NORMAL

## 2017-06-14 PROCEDURE — 81001 URINALYSIS AUTO W/SCOPE: CPT | Performed by: NURSE PRACTITIONER

## 2017-06-14 PROCEDURE — 87210 SMEAR WET MOUNT SALINE/INK: CPT | Performed by: NURSE PRACTITIONER

## 2017-06-14 PROCEDURE — 99213 OFFICE O/P EST LOW 20 MIN: CPT | Performed by: NURSE PRACTITIONER

## 2017-06-14 RX ORDER — SULFAMETHOXAZOLE/TRIMETHOPRIM 800-160 MG
1 TABLET ORAL 2 TIMES DAILY
Qty: 6 TABLET | Refills: 0 | Status: SHIPPED | OUTPATIENT
Start: 2017-06-14 | End: 2017-06-17

## 2017-06-14 NOTE — NURSING NOTE
"Chief Complaint   Patient presents with     UTI       Initial /61 (BP Location: Left arm, Patient Position: Chair, Cuff Size: Adult Regular)  Pulse 60  Temp 97.1  F (36.2  C) (Oral)  Resp 21  Ht 5' 7\" (1.702 m)  Wt 155 lb 6.4 oz (70.5 kg)  SpO2 100%  Breastfeeding? No  BMI 24.34 kg/m2 Estimated body mass index is 24.34 kg/(m^2) as calculated from the following:    Height as of this encounter: 5' 7\" (1.702 m).    Weight as of this encounter: 155 lb 6.4 oz (70.5 kg).  Medication Reconciliation: complete     Clayton Ramirez      "

## 2017-06-14 NOTE — MR AVS SNAPSHOT
After Visit Summary   6/14/2017    Meredith Mello    MRN: [id number]           Patient Information     Date Of Birth          1990        Visit Information        Provider Department      6/14/2017 4:00 PM Suzie Bob APRN [facility]        Today's Diagnoses     Acute cystitis without hematuria    -  1      Care Instructions    [facility]    If you have any questions regarding to your visit please contact your care team:       Team Red:   Clinic Hours Telephone Number   Dr. Rossana Bob, NP   7am-7pm  Monday - Thursday   7am-5pm  Fridays  [phone]  (Appointment scheduling available 24/7)    Questions about your visit?   Team Line  [phone]   Urgent Care - [place] [facility] - 11am-9pm Monday-Friday Saturday-Sunday- 9am-5pm   [place] - 5pm-9pm Monday-Friday Saturday-Sunday- 9am-5pm  [phone] - [facility]  [phone] - [place]       What options do I have for visits at the clinic other than the traditional office visit?  To expand how we care for you, many of our providers are utilizing electronic visits (e-visits) and telephone visits, when medically appropriate, for interactions with their patients rather than a visit in the clinic.   We also offer nurse visits for many medical concerns. Just like any other service, we will bill your insurance company for this type of visit based on time spent on the phone with your provider. Not all insurance companies cover these visits. Please check with your medical insurance if this type of visit is covered. You will be responsible for any charges that are not paid by your insurance.      E-visits via ObjectFX:  generally incur a $35.00 fee.  Telephone visits:  Time spent on the phone: *charged based on time that is spent on the phone in increments of 10 minutes. Estimated cost:   5-10 mins $30.00   11-20 mins. $59.00   21-30  "mins. $85.00     Use Maximum Balance Foundationhart (secure email communication and access to your chart) to send your primary care provider a message or make an appointment. Ask someone on your Team how to sign up for Realvu Inct.  For a Price Quote for your services, please call our Consumer Price Line at [phone].      As always, Thank you for trusting us with your health care needs!  Clayton Ramirez            Follow-ups after your visit        Who to contact     If you have questions or need follow up information about today's clinic visit or your schedule please contact [facility] [place] directly at [phone].  Normal or non-critical lab and imaging results will be communicated to you by MyChart, letter or phone within 4 business days after the clinic has received the results. If you do not hear from us within 7 days, please contact the clinic through Realvu Inct or phone. If you have a critical or abnormal lab result, we will notify you by phone as soon as possible.  Submit refill requests through Tune Clout or call your pharmacy and they will forward the refill request to us. Please allow 3 business days for your refill to be completed.          Additional Information About Your Visit        Tune Clout Information     Realvu Inct lets you send messages to your doctor, view your test results, renew your prescriptions, schedule appointments and more. To sign up, go to [email or web address] . Click on \"Log in\" on the left side of the screen, which will take you to the Welcome page. Then click on \"Sign up Now\" on the right side of the page.     You will be asked to enter the access code listed below, as well as some personal information. Please follow the directions to create your username and password.     Your access code is: [email or web address]  Expires: 2017  4:57 PM     Your access code will  in 90 days. If you need help or a new code, please call your [facility] or [phone].        Care EveryWhere ID     This is " "your Care EveryWhere ID. This could be used by other organizations to access your [facility] medical records  [id number]        Your Vitals Were     Pulse Temperature Respirations Height Pulse Oximetry Breastfeeding?    60 97.1  F (36.2  C) (Oral) 21 5' 7\" (1.702 m) 100% No    BMI (Body Mass Index)                   24.34 kg/m2            Blood Pressure from Last 3 Encounters:   06/14/17 105/61   01/20/17 104/60   01/11/17 111/63    Weight from Last 3 Encounters:   06/14/17 155 lb 6.4 oz (70.5 kg)   01/20/17 150 lb (68 kg)   01/11/17 157 lb (71.2 kg)              We Performed the Following     *UA reflex to Microscopic and Culture ([place] [facility]] (except [place] and [place])     Urine Microscopic     Wet prep          Today's Medication Changes          These changes are accurate as of: 6/14/17  4:57 PM.  If you have any questions, ask your nurse or doctor.               Start taking these medicines.        Dose/Directions    sulfamethoxazole-trimethoprim 800-160 MG per tablet   Commonly known as:  BACTRIM DS/SEPTRA DS   Used for:  Acute cystitis without hematuria   Started by:  Suzie Bob APRN CNP        Dose:  1 tablet   Take 1 tablet by mouth 2 times daily for 3 days   Quantity:  6 tablet   Refills:  0            Where to get your medicines      These medications were sent to [facility]  [place], [place] - [address] [place] [address]  [address] [place] [address] [address], [place] [address]     Phone:  [phone]     sulfamethoxazole-trimethoprim 800-160 MG per tablet                Primary Care Provider Office Phone # Fax #    Rossana Ortiz [phone]       [address] [address]        Thank you!     Thank you for choosing [facility]  for your care. Our goal is always to provide you with excellent care. Hearing back from our patients is one way we can continue to improve our services. Please take a few " minutes to complete the written survey that you may receive in the mail after your visit with us. Thank you!             Your Updated Medication List - Protect others around you: Learn how to safely use, store and throw away your medicines at www.disposemymeds.org.          This list is accurate as of: 6/14/17  4:57 PM.  Always use your most recent med list.                   Brand Name Dispense Instructions for use    cyclobenzaprine 10 MG tablet    FLEXERIL    20 tablet    Take 1 tablet (10 mg) by mouth 3 times daily as needed for muscle spasms       escitalopram 10 MG tablet    LEXAPRO     Take 10 mg by mouth daily       levonorgestrel 20 MCG/24HR IUD    MIRENA     1 each by Intrauterine route once       naproxen 500 MG tablet    NAPROSYN    30 tablet    Take 1 tablet (500 mg) by mouth 2 times daily (with meals)       sulfamethoxazole-trimethoprim 800-160 MG per tablet    BACTRIM DS/SEPTRA DS    6 tablet    Take 1 tablet by mouth 2 times daily for 3 days

## 2017-06-14 NOTE — Clinical Note
Please abstract the following data from this visit with this patient into the appropriate field in Epic:  Pap smear done on this date: September 2016 (approximately), by this group: [facility], results were normal. Had prior abnormal paps so needs pap in 12 months Chlamydia testing done on this date: September 2016

## 2017-06-14 NOTE — PATIENT INSTRUCTIONS
[facility]    If you have any questions regarding to your visit please contact your care team:       Team Red:   Clinic Hours Telephone Number   Dr. Rossana Bob, NP   7am-7pm  Monday - Thursday   7am-5pm  Fridays  [phone]  (Appointment scheduling available 24/7)    Questions about your visit?   Team Line  [phone]   Urgent Care - [place] - 11am-9pm Monday-Friday Saturday-Sunday- 9am-5pm   [place] - 5pm-9pm Monday-Friday Saturday-Sunday- 9am-5pm  [phone] - [place]   [phone] - [place]       What options do I have for visits at the clinic other than the traditional office visit?  To expand how we care for you, many of our providers are utilizing electronic visits (e-visits) and telephone visits, when medically appropriate, for interactions with their patients rather than a visit in the clinic.   We also offer nurse visits for many medical concerns. Just like any other service, we will bill your insurance company for this type of visit based on time spent on the phone with your provider. Not all insurance companies cover these visits. Please check with your medical insurance if this type of visit is covered. You will be responsible for any charges that are not paid by your insurance.      E-visits via Siamab Therapeutics:  generally incur a $35.00 fee.  Telephone visits:  Time spent on the phone: *charged based on time that is spent on the phone in increments of 10 minutes. Estimated cost:   5-10 mins $30.00   11-20 mins. $59.00   21-30 mins. $85.00     Use Quick Keyt (secure email communication and access to your chart) to send your primary care provider a message or make an appointment. Ask someone on your Team how to sign up for [employer].  For a Price Quote for your services, please call our Consumer Price Line at [phone].      As always, Thank you for trusting us with your health care needs!  Clayton PRIEST  [place]

## 2017-06-14 NOTE — PROGRESS NOTES
"  SUBJECTIVE:                                                    Meredith Mello is a 26 year old female who presents to clinic today for the following health issues:    URINARY TRACT SYMPTOMS      Duration: Was treated twice in the last 6 months, 2 -3 weeks ago symptoms have returned      Description    dysuria, frequency, urgency, hesitancy     Intensity:  mild    Accompanying signs and symptoms:  Fever/chills: no   Flank pain no   Nausea and vomiting: no   Vaginal symptoms: itching  Abdominal/Pelvic Pain: no     History  History of frequent UTI's: YES  History of kidney stones: no   Sexually Active: YES  Possibility of pregnancy: No    Precipitating or alleviating factors: Cipro     Therapies tried and outcome: increase fluid intake   Outcome: none      Does have some mild vaginal itching.   Sexually active with 1 male partner for the last 7-8 months, does not necessarily note a correlate with intercourse.    Problem list and histories reviewed & adjusted, as indicated.  Additional history: as documented    Patient Active Problem List   Diagnosis     CARDIOVASCULAR SCREENING; LDL GOAL LESS THAN 160     SHERRI (generalized anxiety disorder)     Past Surgical History:   Procedure Laterality Date     NO HISTORY OF SURGERY         Social History   Substance Use Topics     Smoking status: Never Smoker     Smokeless tobacco: Not on file     Alcohol use Yes     History reviewed. No pertinent family history.        Reviewed and updated as needed this visit by clinical staff  Tobacco  Allergies  Meds  Med Hx  Surg Hx  Fam Hx  Soc Hx      Reviewed and updated as needed this visit by Provider         ROS:  Constitutional, gi and gu systems are negative, except as otherwise noted.    OBJECTIVE:                                                    /61 (BP Location: Left arm, Patient Position: Chair, Cuff Size: Adult Regular)  Pulse 60  Temp 97.1  F (36.2  C) (Oral)  Resp 21  Ht 5' 7\" (1.702 m)  Wt 155 lb 6.4 oz " (70.5 kg)  SpO2 100%  Breastfeeding? No  BMI 24.34 kg/m2  Body mass index is 24.34 kg/(m^2).  GENERAL: healthy, alert and no distress  ABDOMEN: soft, nontender, no hepatosplenomegaly, no masses and bowel sounds normal  BACK: no CVA tenderness, no paralumbar tenderness    Diagnostic Test Results:  Results for orders placed or performed in visit on 06/14/17   *UA reflex to Microscopic and Culture ([facility] (except [place] and [place])   Result Value Ref Range    Color Urine Yellow     Appearance Urine Slightly Cloudy     Glucose Urine Negative NEG mg/dL    Bilirubin Urine Negative NEG    Ketones Urine Negative NEG mg/dL    Specific Gravity Urine <=1.005 1.003 - 1.035    Blood Urine Trace (A) NEG    pH Urine 6.0 5.0 - 7.0 pH    Protein Albumin Urine Negative NEG mg/dL    Urobilinogen Urine 0.2 0.2 - 1.0 EU/dL    Nitrite Urine Negative NEG    Leukocyte Esterase Urine Trace (A) NEG    Source Midstream Urine    Urine Microscopic   Result Value Ref Range    WBC Urine 2-5 (A) 0 - 2 /HPF    RBC Urine 2-5 (A) 0 - 2 /HPF    Squamous Epithelial /LPF Urine Few FEW /LPF   Wet prep   Result Value Ref Range    Specimen Description Vagina     Wet Prep       No Trichomonas seen  No clue cells seen  No yeast seen  (Note)  MANY BACTERIA AND WBC SEEN.  SELF COLLECT.      Micro Report Status FINAL 06/14/2017         ASSESSMENT/PLAN:                                                        1. Acute cystitis without hematuria  Symptoms similar to prior infections so will start antibiotics.  Void after intercourse- possibly postcoital?   - *UA reflex to Microscopic and Culture ([place] and [facility] (except [place] and [place])  - Wet prep  - sulfamethoxazole-trimethoprim (BACTRIM DS/SEPTRA DS) 800-160 MG per tablet; Take 1 tablet by mouth 2 times daily for 3 days  Dispense: 6 tablet; Refill: 0    Follow up as needed    SHAYLA Ozuna [facility] [place]

## 2017-12-26 ENCOUNTER — OFFICE VISIT (OUTPATIENT)
Dept: FAMILY MEDICINE | Facility: CLINIC | Age: 27
End: 2017-12-26
Payer: COMMERCIAL

## 2017-12-26 VITALS
HEIGHT: 66 IN | WEIGHT: 148.5 LBS | SYSTOLIC BLOOD PRESSURE: 122 MMHG | OXYGEN SATURATION: 99 % | DIASTOLIC BLOOD PRESSURE: 72 MMHG | TEMPERATURE: 97 F | BODY MASS INDEX: 23.87 KG/M2 | HEART RATE: 78 BPM

## 2017-12-26 DIAGNOSIS — Z11.3 ROUTINE SCREENING FOR STI (SEXUALLY TRANSMITTED INFECTION): ICD-10-CM

## 2017-12-26 DIAGNOSIS — Z00.00 ROUTINE GENERAL MEDICAL EXAMINATION AT A HEALTH CARE FACILITY: Primary | ICD-10-CM

## 2017-12-26 DIAGNOSIS — Z23 NEED FOR TDAP VACCINATION: ICD-10-CM

## 2017-12-26 DIAGNOSIS — G47.9 SLEEP DISTURBANCE: ICD-10-CM

## 2017-12-26 DIAGNOSIS — F41.1 GAD (GENERALIZED ANXIETY DISORDER): ICD-10-CM

## 2017-12-26 LAB
SPECIMEN SOURCE: NORMAL
WET PREP SPEC: NORMAL

## 2017-12-26 PROCEDURE — 87491 CHLMYD TRACH DNA AMP PROBE: CPT | Performed by: NURSE PRACTITIONER

## 2017-12-26 PROCEDURE — 87210 SMEAR WET MOUNT SALINE/INK: CPT | Performed by: NURSE PRACTITIONER

## 2017-12-26 PROCEDURE — 90715 TDAP VACCINE 7 YRS/> IM: CPT | Performed by: NURSE PRACTITIONER

## 2017-12-26 PROCEDURE — 87591 N.GONORRHOEAE DNA AMP PROB: CPT | Performed by: NURSE PRACTITIONER

## 2017-12-26 PROCEDURE — 86780 TREPONEMA PALLIDUM: CPT | Performed by: NURSE PRACTITIONER

## 2017-12-26 PROCEDURE — 86803 HEPATITIS C AB TEST: CPT | Performed by: NURSE PRACTITIONER

## 2017-12-26 PROCEDURE — 36415 COLL VENOUS BLD VENIPUNCTURE: CPT | Performed by: NURSE PRACTITIONER

## 2017-12-26 PROCEDURE — 99395 PREV VISIT EST AGE 18-39: CPT | Performed by: NURSE PRACTITIONER

## 2017-12-26 PROCEDURE — 87389 HIV-1 AG W/HIV-1&-2 AB AG IA: CPT | Performed by: NURSE PRACTITIONER

## 2017-12-26 RX ORDER — ESCITALOPRAM OXALATE 10 MG/1
10 TABLET ORAL DAILY
Qty: 90 TABLET | Refills: 3 | Status: SHIPPED | OUTPATIENT
Start: 2017-12-26 | End: 2018-05-29

## 2017-12-26 NOTE — PATIENT INSTRUCTIONS
[facility]    If you have any questions regarding to your visit please contact your care team:     Team Pink:   Clinic Hours Telephone Number   Internal Medicine:  Dr. [place] Webb NP       7am-7pm  Monday - Thursday   7am-5pm  Fridays  [phone]  (Appointment scheduling available 24/7)    Questions about your visit?  Team Line  [phone]   Urgent Care - [place] [facility] - 11am-9pm Monday-Friday Saturday-Sunday- 9am-5pm   [place] - 5pm-9pm Monday-Friday Saturday-Sunday- 9am-5pm  [phone] - [place]   [phone] - [place]       What options do I have for visits at the clinic other than the traditional office visit?  To expand how we care for you, many of our providers are utilizing electronic visits (e-visits) and telephone visits, when medically appropriate, for interactions with their patients rather than a visit in the clinic.   We also offer nurse visits for many medical concerns. Just like any other service, we will bill your insurance company for this type of visit based on time spent on the phone with your provider. Not all insurance companies cover these visits. Please check with your medical insurance if this type of visit is covered. You will be responsible for any charges that are not paid by your insurance.      E-visits via Fliptu:  generally incur a $35.00 fee.  Telephone visits:  Time spent on the phone: *charged based on time that is spent on the phone in increments of 10 minutes. Estimated cost:   5-10 mins $30.00   11-20 mins. $59.00   21-30 mins. $85.00   Use Kuldatt (secure email communication and access to your chart) to send your primary care provider a message or make an appointment. Ask someone on your Team how to sign up for Fliptu.    For a Price Quote for your services, please call our Consumer Price Line at [phone].    As always, Thank you for trusting us with your health care  needs!    Karyn REYES MA

## 2017-12-26 NOTE — MR AVS SNAPSHOT
After Visit Summary   12/26/2017    Meredith Mello    MRN: [id number]           Patient Information     Date Of Birth          1990        Visit Information        Provider Department      12/26/2017 4:00 PM Bianca Webb APRN [facility]        Today's Diagnoses     Routine general medical examination at a health care facility    -  1    Need for Tdap vaccination        Routine screening for STI (sexually transmitted infection)        Sleep disturbance        SHERRI (generalized anxiety disorder)          Care Instructions    [facility]    If you have any questions regarding to your visit please contact your care team:     Team Pink:   Clinic Hours Telephone Number   Internal Medicine:  Dr. [place] Webb, NP       7am-7pm  Monday - Thursday   7am-5pm  Fridays  [phone]  (Appointment scheduling available 24/7)    Questions about your visit?  Team Line  [phone]   [facility] [place] [facility] - 11am-9pm Monday-Friday Saturday-Sunday- 9am-5pm   [place] - 5pm-9pm Monday-Friday Saturday-Sunday- 9am-5pm  [phone] - [place]   [phone] - [place]       What options do I have for visits at the clinic other than the traditional office visit?  To expand how we care for you, many of our providers are utilizing electronic visits (e-visits) and telephone visits, when medically appropriate, for interactions with their patients rather than a visit in the clinic.   We also offer nurse visits for many medical concerns. Just like any other service, we will bill your insurance company for this type of visit based on time spent on the phone with your provider. Not all insurance companies cover these visits. Please check with your medical insurance if this type of visit is covered. You will be responsible for any charges that are not paid by your insurance.      E-visits via Folkstr:  generally  "incur a $35.00 fee.  Telephone visits:  Time spent on the phone: *charged based on time that is spent on the phone in increments of 10 minutes. Estimated cost:   5-10 mins $30.00   11-20 mins. $59.00   21-30 mins. $85.00   Use Emulation and Verification Engineeringhart (secure email communication and access to your chart) to send your primary care provider a message or make an appointment. Ask someone on your Team how to sign up for Advise Onlyt.    For a Price Quote for your services, please call our [place] Line at [phone].    As always, Thank you for trusting us with your health care needs!    Karyn REYES MA            Follow-ups after your visit        Who to contact     If you have questions or need follow up information about today's clinic visit or your schedule please contact [facility] directly at [phone].  Normal or non-critical lab and imaging results will be communicated to you by Emulation and Verification Engineeringhart, letter or phone within 4 business days after the clinic has received the results. If you do not hear from us within 7 days, please contact the clinic through Em[employer] Ver[employer] Engineeringhart or phone. If you have a critical or abnormal lab result, we will notify you by phone as soon as possible.  Submit refill requests through RegeneMed or call your pharmacy and they will forward the refill request to us. Please allow 3 business days for your refill to be completed.          Additional Information About Your Visit        RegeneMed Information     RegeneMed lets you send messages to your doctor, view your test results, renew your prescriptions, schedule appointments and more. To sign up, go to [email or web address] . Click on \"Log in\" on the left side of the screen, which will take you to the Welcome page. Then click on \"Sign up Now\" on the right side of the page.     You will be asked to enter the access code listed below, as well as some personal information. Please follow the directions to create your username and password.     Your access code is: " "[email or web address]  Expires: 3/26/2018  4:36 PM     Your access code will  in 90 days. If you need help or a new code, please call your [place] clinic or [phone].        Care EveryWhere ID     This is your Care EveryWhere ID. This could be used by other organizations to access your [place] medical records  [id number]        Your Vitals Were     Pulse Temperature Height Pulse Oximetry BMI (Body Mass Index)       78 97  F (36.1  C) (Oral) 5' 6.14\" (1.68 m) 99% 23.87 kg/m2        Blood Pressure from Last 3 Encounters:   17 122/72   17 105/61   17 104/60    Weight from Last 3 Encounters:   17 148 lb 8 oz (67.4 kg)   17 155 lb 6.4 oz (70.5 kg)   17 150 lb (68 kg)              We Performed the Following     Anti Treponema     Chlamydia trachomatis PCR     Hepatitis C antibody     HIV Antigen Antibody Combo     Neisseria gonorrhoeae PCR     TDAP VACCINE ([facility]ACEL)     Wet prep          Where to get your medicines      These medications were sent to [place] [address] [facility] [place] - [address]  [address] [place] [address]     Phone:  [phone]     escitalopram 10 MG tablet          Primary Care Provider Office Phone # Fax #    Rossana Ortiz [phone] [address] [place] [address] [address]  [place] [address]        Equal Access to Services     [facility] AH: Hadii aad ku hadasho Soomaali, waaxda luqadaha, qaybta kaalmada adeegyada, waxtez willis haybetito nicolas . So [facility] [phone].    ATENCIÓN: Si habla español, tiene a dsouza disposición servicios gratuitos de asistencia lingüística. Llame al [phone].    We comply with applicable federal civil rights laws and [place] laws. We do not discriminate on the basis of race, color, national origin, age, disability, sex, sexual orientation, or gender identity.            Thank you!     Thank you for choosing [facility]  for your care. Our goal is always to provide you with excellent " care. Hearing back from our patients is one way we can continue to improve our services. Please take a few minutes to complete the written survey that you may receive in the mail after your visit with us. Thank you!             Your Updated Medication List - Protect others around you: Learn how to safely use, store and throw away your medicines at www.disposemymeds.org.          This list is accurate as of: 12/26/17  4:36 PM.  Always use your most recent med list.                   Brand Name Dispense Instructions for use Diagnosis    escitalopram 10 MG tablet    LEXAPRO    90 tablet    Take 1 tablet (10 mg) by mouth daily    SHERRI (generalized anxiety disorder)       levonorgestrel 20 MCG/24HR IUD    MIRENA     1 each by Intrauterine route once

## 2017-12-26 NOTE — LETTER
[facility]  [address] [place] [address]    December 27, 2017    Meredith Mello  [address]  [place] [address]          Dear Meredith,    No vaginal bacterial infection.       If you have any questions please feel free to contact [phone] or myself via Luminatet.     Enclosed is a copy of your results.     Results for orders placed or performed in visit on 12/26/17   Wet prep   Result Value Ref Range    Specimen Description Vagina     Wet Prep No Trichomonas seen     Wet Prep No clue cells seen     Wet Prep No yeast seen     Wet Prep (Note)  MANY BACTERIA SEEN.          If you have any questions or concerns, please call myself or my nurse at [phone].      Sincerely,        Bianca Webb CNP/granados

## 2017-12-26 NOTE — PROGRESS NOTES
SUBJECTIVE:   CC: Meredith Mello is an 27 year old woman who presents for preventive health visit.     Physical   Annual:     Getting at least 3 servings of Calcium per day::  Yes    Bi-annual eye exam::  Yes    Dental care twice a year::  Yes    Sleep apnea or symptoms of sleep apnea::  Daytime drowsiness    Diet::  Regular (no restrictions)    Frequency of exercise::  2-3 days/week    Duration of exercise::  45-60 minutes    Taking medications regularly::  Yes    Medication side effects::  None    Additional concerns today::  YES            Patient requests STI screening.  She denies any symptoms.    Patient notes difficulty falling and staying asleep.  She denies napping and falls asleep and wakes at the same time each day.  She has tried lavender essential oils and melatonin once.  She does sleep with her cell phone in her room.    Anxiety-  Patient denies any side effects from lexapro.  She feel symptoms are well controlled.    Today's PHQ-2 Score:   PHQ-2 ( 1999 Pfizer) 12/26/2017   Q1: Little interest or pleasure in doing things 0   Q2: Feeling down, depressed or hopeless 0   PHQ-2 Score 0   Q1: Little interest or pleasure in doing things Not at all   Q2: Feeling down, depressed or hopeless Not at all   PHQ-2 Score 0       Abuse: Current or Past(Physical, Sexual or Emotional)- No  Do you feel safe in your environment - Yes    Social History   Substance Use Topics     Smoking status: Never Smoker     Smokeless tobacco: Never Used     Alcohol use Yes     Alcohol Use 12/26/2017   If you drink alcohol, do you typically have greater than 3 drinks per day OR greater than 7 drinks per week?   No   No flowsheet data found.      Reviewed orders with patient.  Reviewed health maintenance and updated orders accordingly - Yes  Labs reviewed in EPIC  BP Readings from Last 3 Encounters:   12/26/17 122/72   06/14/17 105/61   01/20/17 104/60    Wt Readings from Last 3 Encounters:   12/26/17 148 lb 8 oz (67.4 kg)  "  06/14/17 155 lb 6.4 oz (70.5 kg)   01/20/17 150 lb (68 kg)                  Patient Active Problem List   Diagnosis     CARDIOVASCULAR SCREENING; LDL GOAL LESS THAN 160     SHERRI (generalized anxiety disorder)     Past Surgical History:   Procedure Laterality Date     NO HISTORY OF SURGERY         Social History   Substance Use Topics     Smoking status: Never Smoker     Smokeless tobacco: Never Used     Alcohol use Yes     History reviewed. No pertinent family history.      No Known Allergies  No lab results found.           Mammogram not appropriate for this patient based on age.    Pertinent mammograms are reviewed under the imaging tab.  History of abnormal Pap smear: Last 3 Pap Results: No results found for: PAP    Reviewed and updated as needed this visit by clinical staff  Tobacco  Allergies  Meds  Problems  Med Hx  Surg Hx  Fam Hx  Soc Hx          Reviewed and updated as needed this visit by Provider  Allergies  Meds  Problems          Review of Systems  C: NEGATIVE for fever, chills, change in weight  I: NEGATIVE for worrisome rashes, moles or lesions  E: NEGATIVE for vision changes or irritation  ENT: NEGATIVE for ear, mouth and throat problems  R: NEGATIVE for significant cough or SOB  B: NEGATIVE for masses, tenderness or discharge  CV: NEGATIVE for chest pain, palpitations or peripheral edema  GI: NEGATIVE for nausea, abdominal pain, heartburn, or change in bowel habits  : NEGATIVE for unusual urinary or vaginal symptoms. Periods are regular.  M: NEGATIVE for significant arthralgias or myalgia  N: NEGATIVE for weakness, dizziness or paresthesias  P: NEGATIVE for changes in mood or affect     OBJECTIVE:   /72  Pulse 78  Temp 97  F (36.1  C) (Oral)  Ht 5' 6.14\" (1.68 m)  Wt 148 lb 8 oz (67.4 kg)  SpO2 99%  BMI 23.87 kg/m2  Physical Exam  GENERAL: healthy, alert and no distress  EYES: Eyes grossly normal to inspection, PERRL and conjunctivae and sclerae normal  HENT: ear canals and " TM's normal, nose and mouth without ulcers or lesions  NECK: no adenopathy, no asymmetry, masses, or scars and thyroid normal to palpation  RESP: lungs clear to auscultation - no rales, rhonchi or wheezes  BREAST: normal without masses, tenderness or nipple discharge and no palpable axillary masses or adenopathy  CV: regular rate and rhythm, normal S1 S2, no S3 or S4, no murmur, click or rub, no peripheral edema and peripheral pulses strong  ABDOMEN: soft, nontender, no hepatosplenomegaly, no masses and bowel sounds normal   (female): normal female external genitalia, normal urethral meatus  and normal cervix with IUD strings noted.  MS: no gross musculoskeletal defects noted, no edema    ASSESSMENT/PLAN:   1. Routine general medical examination at a health care facility      2. Need for Tdap vaccination    - TDAP VACCINE (ADACEL)    3. Routine screening for STI (sexually transmitted infection)    - Anti Treponema  - Chlamydia trachomatis PCR  - HIV Antigen Antibody Combo  - Neisseria gonorrhoeae PCR  - Wet prep  - Hepatitis C antibody    4. Sleep disturbance  Patient to retry melatonin 3 mg 2-3 hours before bed or over the counter tylenol pm.  She will also try removing her cell phone from her room.  Patient to contact clinic if no improvement.    5. SHERRI (generalized anxiety disorder)  Stable.  Continue current treatment plan and medications.    - escitalopram (LEXAPRO) 10 MG tablet; Take 1 tablet (10 mg) by mouth daily  Dispense: 90 tablet; Refill: 3    COUNSELING:  Reviewed preventive health counseling, as reflected in patient instructions       Regular exercise       Healthy diet/nutrition       Immunizations    Vaccinated for: TDAP          BP Screening:   Last 3 BP Readings:    BP Readings from Last 3 Encounters:   12/26/17 122/72   06/14/17 105/61   01/20/17 104/60       The following was recommended to the patient:  Re-screen BP within a year and recommended lifestyle modifications     reports that she has  "never smoked. She has never used smokeless tobacco.    Estimated body mass index is 23.87 kg/(m^2) as calculated from the following:    Height as of this encounter: 5' 6.14\" (1.68 m).    Weight as of this encounter: 148 lb 8 oz (67.4 kg).         Counseling Resources:  ATP IV Guidelines  Pooled Cohorts Equation Calculator  Breast Cancer Risk Calculator  FRAX Risk Assessment  ICSI Preventive Guidelines  Dietary Guidelines for Americans, 2010  USDA's MyPlate  ASA Prophylaxis  Lung CA Screening    SHAYLA Felix CNP  [facility] for HPI/ROS submitted by the patient on 12/26/2017   PHQ-2 Score: 0    "

## 2017-12-27 LAB
HCV AB SERPL QL IA: NONREACTIVE
HIV 1+2 AB+HIV1 P24 AG SERPL QL IA: NONREACTIVE

## 2017-12-27 NOTE — PROGRESS NOTES
Dear Meredith,    Your recent test results are attached.      No vaginal bacterial infection.      If you have any questions please feel free to contact [phone] or myself via [employer].    Sincerely,  Bianca Webb, CNP

## 2017-12-28 LAB
C TRACH DNA SPEC QL NAA+PROBE: NEGATIVE
N GONORRHOEA DNA SPEC QL NAA+PROBE: NEGATIVE
SPECIMEN SOURCE: NORMAL
SPECIMEN SOURCE: NORMAL
T PALLIDUM IGG+IGM SER QL: NEGATIVE

## 2018-03-12 ENCOUNTER — OFFICE VISIT (OUTPATIENT)
Dept: INTERNAL MEDICINE | Facility: CLINIC | Age: 28
End: 2018-03-12
Payer: COMMERCIAL

## 2018-03-12 VITALS
WEIGHT: 158 LBS | BODY MASS INDEX: 24.8 KG/M2 | OXYGEN SATURATION: 99 % | HEIGHT: 67 IN | DIASTOLIC BLOOD PRESSURE: 74 MMHG | HEART RATE: 60 BPM | TEMPERATURE: 98.2 F | SYSTOLIC BLOOD PRESSURE: 112 MMHG | RESPIRATION RATE: 20 BRPM

## 2018-03-12 DIAGNOSIS — J35.8 TONSIL ASYMMETRY: Primary | ICD-10-CM

## 2018-03-12 LAB
DEPRECATED S PYO AG THROAT QL EIA: NORMAL
SPECIMEN SOURCE: NORMAL

## 2018-03-12 PROCEDURE — 99213 OFFICE O/P EST LOW 20 MIN: CPT | Performed by: PHYSICIAN ASSISTANT

## 2018-03-12 PROCEDURE — 87880 STREP A ASSAY W/OPTIC: CPT | Performed by: PHYSICIAN ASSISTANT

## 2018-03-12 PROCEDURE — 87081 CULTURE SCREEN ONLY: CPT | Performed by: PHYSICIAN ASSISTANT

## 2018-03-12 ASSESSMENT — PAIN SCALES - GENERAL: PAINLEVEL: NO PAIN (0)

## 2018-03-12 NOTE — PROGRESS NOTES
"  SUBJECTIVE:   Meredith Mello is a 27 year old female who presents to clinic today for the following health issues:    Chief Complaint   Patient presents with     Throat Problem     Swollen tonsile on the Right side x 24 hours.      - patient presents to clinic for concerns of tonsil enlargement on the right side  - patient notes she locked looked in the back of her throat and noted symptoms   - she otherwise did not notice any symptoms  - onset: unknown, but looked yesterday   - patient notes she can kind of feel it when she swallows,  but it is not painful or difficult to swallow  - denies: pain, fever, or body aches   - sick contacts: work in [employer]     Problem list and histories reviewed & adjusted, as indicated.  Additional history: as documented      Reviewed and updated as needed this visit by clinical staff  Tobacco  Allergies  Meds  Problems       Reviewed and updated as needed this visit by Provider  Meds  Problems         OBJECTIVE:     /74  Pulse 60  Temp 98.2  F (36.8  C) (Oral)  Resp 20  Ht 5' 6.5\" (1.689 m)  Wt 158 lb (71.7 kg)  SpO2 99%  Breastfeeding? No  BMI 25.12 kg/m2  Body mass index is 25.12 kg/(m^2).  GENERAL: healthy, alert and no distress  HENT: normal cephalic/atraumatic and oral pharynx shows enlarged right tonsil that appears normal in color, but somewhat lumpy, and left tonsil is non-apparent on exam, no swelling above the tonsils, no erythema, no tonsil stones noted, and no exudates  NECK: bilateral anterior cervical adenopathy      ASSESSMENT/PLAN:     1. Tonsil asymmetry  - unknown etiology, based on asymmetry will refer to ENT for further eval and treatment   - OTOLARYNGOLOGY REFERRAL  - Strep, Rapid Screen    Patient agrees to above plan and all questions are answered.     Ashley Hernández PA-C  [facility]  "

## 2018-03-13 LAB
BACTERIA SPEC CULT: NORMAL
SPECIMEN SOURCE: NORMAL

## 2018-03-19 ENCOUNTER — OFFICE VISIT (OUTPATIENT)
Dept: OTOLARYNGOLOGY | Facility: CLINIC | Age: 28
End: 2018-03-19
Payer: COMMERCIAL

## 2018-03-19 VITALS
HEART RATE: 51 BPM | DIASTOLIC BLOOD PRESSURE: 71 MMHG | BODY MASS INDEX: 23.86 KG/M2 | RESPIRATION RATE: 12 BRPM | OXYGEN SATURATION: 100 % | SYSTOLIC BLOOD PRESSURE: 119 MMHG | HEIGHT: 67 IN | WEIGHT: 152 LBS

## 2018-03-19 DIAGNOSIS — J35.1 HYPERTROPHY OF TONSILS: ICD-10-CM

## 2018-03-19 DIAGNOSIS — J35.8 TONSIL ASYMMETRY: Primary | ICD-10-CM

## 2018-03-19 PROCEDURE — 99204 OFFICE O/P NEW MOD 45 MIN: CPT | Performed by: OTOLARYNGOLOGY

## 2018-03-19 RX ORDER — CHLORHEXIDINE GLUCONATE ORAL RINSE 1.2 MG/ML
15 SOLUTION DENTAL 2 TIMES DAILY
Qty: 210 ML | Refills: 1 | Status: SHIPPED | OUTPATIENT
Start: 2018-03-19 | End: 2018-03-26

## 2018-03-19 NOTE — LETTER
3/19/2018         RE: Meredith Mello  [address]  [place] [address]        Dear Colleague,    Thank you for referring your patient, Meredith Mello, to the [facility]. Please see a copy of my visit note below.    History of Present Illness - Meredith Mello is a very pleasant 27 year old female kindly referred to me by Ashley Hernández due to a tonsil issue.    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  On looking in, the RIGHT tonsil was very swollen, and she could feel a foreign body sensation.  However, the tonsil was not red or purulent at all. Prior to this, there had been no change in swallowing, solids or liquids.  No change in voice, no change in exercise tolerance.  No other changes in her health, no night sweats, no unplanned changes in weight.    No previous ENT surgery.    Past Medical History -   Patient Active Problem List   Diagnosis     CARDIOVASCULAR SCREENING; LDL GOAL LESS THAN 160     SHERRI (generalized anxiety disorder)       Current Medications -   Current Outpatient Prescriptions:      escitalopram (LEXAPRO) 10 MG tablet, Take 1 tablet (10 mg) by mouth daily, Disp: 90 tablet, Rfl: 3     levonorgestrel (MIRENA) 20 MCG/24HR IUD, 1 each by Intrauterine route once, Disp: , Rfl:     Allergies -   Allergies   Allergen Reactions     Gluten Meal Diarrhea and Rash       Social History -   Social History     Social History     Marital status: Single     Spouse name: N/A     Number of children: 0     Years of education: 16     Occupational History     music therapy  [employer]      Student     Social History Main Topics     Smoking status: Never Smoker     Smokeless tobacco: Never Used     Alcohol use Yes     Drug use: Yes     Special: Marijuana     Sexual activity: Yes     Partners: Male     Birth control/ protection: IUD     Other Topics Concern     None     Social History Narrative       Family History - No family  "history on file.    Review of Systems - As per HPI and PMHx, otherwise 10+ system review of the head and neck, and general constitution is negative.    Physical Exam  B/P: 119/71, T: Data Unavailable, P: 51, R: 12  Vitals: /71  Pulse 51  Resp 12  Ht 1.689 m (5' 6.5\")  Wt 68.9 kg (152 lb)  SpO2 100%  BMI 24.17 kg/m2  BMI= Body mass index is 24.17 kg/(m^2).    General - The patient is well nourished and well developed, and appears to have good nutritional status.  Alert and oriented to person and place, answers questions and cooperates with examination appropriately.   Head and Face - Normocephalic and atraumatic, with no gross asymmetry noted of the contour of the facial features.  The facial nerve is intact, with strong symmetric movements.  Voice and Breathing - The patient was breathing comfortably without the use of accessory muscles. There was no wheezing, stridor, or stertor.  The patients voice was clear and strong, and had appropriate pitch and quality.  Ears - The tympanic membranes are normal in appearance, bony landmarks are intact.  No retraction, perforation, or masses.  Normal mobility on valsalva maneuver, with no reports of dizziness on insuflation.  No fluid or purulence was seen in the external canal or the middle ear. No evidence of infection of the middle ear or external canal, cerumen was normal in appearance.  Eyes - Extraocular movements intact, and the pupils were reactive to light.  Sclera were not icteric or injected, conjunctiva were pink and moist.  Mouth - Examination of the oral cavity showed pink, healthy oral mucosa. No lesions or ulcerations noted.  The tongue was mobile and midline, and the dentition were in good condition.    Throat - The walls of the oropharynx were smooth, pink, moist, symmetric, and had no lesions or ulcerations.  The tonsillar pillars and soft palate were symmetric.  The uvula was midline on elevation.  The RIGHT tonsil is definitely large, about 3+, " and with a mirror I could see a deep crypt with a large purulent tonsil stone.  The LEFT tonsil is normal adult size, 1, and looks normal.  Neck - Normal midline excursion of the laryngotracheal complex during swallowing.  Full range of motion on passive movement.  Palpation of the occipital, submental, submandibular, internal jugular chain, and supraclavicular nodes did not demonstrate any abnormal lymph nodes or masses.  The carotid pulse was palpable bilaterally.  Palpation of the thyroid was soft and smooth, with no nodules or goiter appreciated.  The trachea was mobile and midline.  Nose - External contour is symmetric, no gross deflection or scars.  Nasal mucosa is pink and moist with no abnormal mucus.  The septum was midline and non-obstructive, turbinates of normal size and position.  No polyps, masses, or purulence noted on examination.      A/P - Meredith Mello is a 27 year old female  (J35.8) Tonsil asymmetry  (primary encounter diagnosis)  (J35.1) Hypertrophy of tonsils    Given the texture and appearance, as well as the history, I actually suspect an asymmetry is due to a small microabscess or tonsil stone causing irritation and acute hypertrophy.  I will treat with peridex for a week.  More importantly, follow up, one month.  If not resolved, or especially if bigger, then we will discuss tonsillectomy.    Again, thank you for allowing me to participate in the care of your patient.        Sincerely,        Alex Valentino MD

## 2018-03-19 NOTE — PROGRESS NOTES
History of Present Illness - Meredith Mello is a very pleasant 27 year old female kindly referred to me by Ashley Hernández due to a tonsil issue.    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  On looking in, the RIGHT tonsil was very swollen, and she could feel a foreign body sensation.  However, the tonsil was not red or purulent at all. Prior to this, there had been no change in swallowing, solids or liquids.  No change in voice, no change in exercise tolerance.  No other changes in her health, no night sweats, no unplanned changes in weight.    No previous ENT surgery.    Past Medical History -   Patient Active Problem List   Diagnosis     CARDIOVASCULAR SCREENING; LDL GOAL LESS THAN 160     SHERRI (generalized anxiety disorder)       Current Medications -   Current Outpatient Prescriptions:      escitalopram (LEXAPRO) 10 MG tablet, Take 1 tablet (10 mg) by mouth daily, Disp: 90 tablet, Rfl: 3     levonorgestrel (MIRENA) 20 MCG/24HR IUD, 1 each by Intrauterine route once, Disp: , Rfl:     Allergies -   Allergies   Allergen Reactions     Gluten Meal Diarrhea and Rash       Social History -   Social History     Social History     Marital status: Single     Spouse name: N/A     Number of children: 0     Years of education: 16     Occupational History     music therapy  [employer]      Student     Social History Main Topics     Smoking status: Never Smoker     Smokeless tobacco: Never Used     Alcohol use Yes     Drug use: Yes     Special: Marijuana     Sexual activity: Yes     Partners: Male     Birth control/ protection: IUD     Other Topics Concern     None     Social History Narrative       Family History - No family history on file.    Review of Systems - As per HPI and PMHx, otherwise 10+ system review of the head and neck, and general constitution is negative.    Physical Exam  B/P: 119/71, T: Data Unavailable, P: 51, R: 12  Vitals: /71  Pulse  "51  Resp 12  Ht 1.689 m (5' 6.5\")  Wt 68.9 kg (152 lb)  SpO2 100%  BMI 24.17 kg/m2  BMI= Body mass index is 24.17 kg/(m^2).    General - The patient is well nourished and well developed, and appears to have good nutritional status.  Alert and oriented to person and place, answers questions and cooperates with examination appropriately.   Head and Face - Normocephalic and atraumatic, with no gross asymmetry noted of the contour of the facial features.  The facial nerve is intact, with strong symmetric movements.  Voice and Breathing - The patient was breathing comfortably without the use of accessory muscles. There was no wheezing, stridor, or stertor.  The patients voice was clear and strong, and had appropriate pitch and quality.  Ears - The tympanic membranes are normal in appearance, bony landmarks are intact.  No retraction, perforation, or masses.  Normal mobility on valsalva maneuver, with no reports of dizziness on insuflation.  No fluid or purulence was seen in the external canal or the middle ear. No evidence of infection of the middle ear or external canal, cerumen was normal in appearance.  Eyes - Extraocular movements intact, and the pupils were reactive to light.  Sclera were not icteric or injected, conjunctiva were pink and moist.  Mouth - Examination of the oral cavity showed pink, healthy oral mucosa. No lesions or ulcerations noted.  The tongue was mobile and midline, and the dentition were in good condition.    Throat - The walls of the oropharynx were smooth, pink, moist, symmetric, and had no lesions or ulcerations.  The tonsillar pillars and soft palate were symmetric.  The uvula was midline on elevation.  The RIGHT tonsil is definitely large, about 3+, and with a mirror I could see a deep crypt with a large purulent tonsil stone.  The LEFT tonsil is normal adult size, 1, and looks normal.  Neck - Normal midline excursion of the laryngotracheal complex during swallowing.  Full range of " motion on passive movement.  Palpation of the occipital, submental, submandibular, internal jugular chain, and supraclavicular nodes did not demonstrate any abnormal lymph nodes or masses.  The carotid pulse was palpable bilaterally.  Palpation of the thyroid was soft and smooth, with no nodules or goiter appreciated.  The trachea was mobile and midline.  Nose - External contour is symmetric, no gross deflection or scars.  Nasal mucosa is pink and moist with no abnormal mucus.  The septum was midline and non-obstructive, turbinates of normal size and position.  No polyps, masses, or purulence noted on examination.      A/P - Meredith Mello is a 27 year old female  (J35.8) Tonsil asymmetry  (primary encounter diagnosis)  (J35.1) Hypertrophy of tonsils    Given the texture and appearance, as well as the history, I actually suspect an asymmetry is due to a small microabscess or tonsil stone causing irritation and acute hypertrophy.  I will treat with peridex for a week.  More importantly, follow up, one month.  If not resolved, or especially if bigger, then we will discuss tonsillectomy.

## 2018-03-19 NOTE — PATIENT INSTRUCTIONS
Scheduling Information  To schedule your CT/MRI scan, please contact [facility] at [phone] OR [facility] at [phone]    To schedule your Surgery, please contact our Specialty Schedulers at [phone]      ENT Clinic Locations Clinic Hours Telephone Number     [facility]  [address] [place] [address]   Monday:           1:00pm -- 5:00pm    Friday:              8:00am - 12:00pm   To schedule/reschedule an appointment with   Dr. Valentino,   please contact our   Specialty Scheduling Department at:     [phone]       [facility]  [address] [address] Tuesday:          8:00am -- 2:00pm         Urgent Care Locations Clinic Hours Telephone Numbers     [facility]  [address] [place] [address]     Monday-Friday:     11:00am - 9:00pm    Saturday-Sunday:  9:00am - 5:00pm   [phone]     [facility]  [address] [place] [address]     Monday-Friday:      5:00pm - 9:00pm     Saturday-Sunday:  9:00am - 5:00pm   [phone]

## 2018-03-19 NOTE — MR AVS SNAPSHOT
After Visit Summary   3/19/2018    Meredith Mello    MRN: [id number]           Patient Information     Date Of Birth          1990        Visit Information        Provider Department      3/19/2018 4:45 PM [facility]        Today's Diagnoses     Tonsil asymmetry    -  1    Hypertrophy of tonsils          Care Instructions    Scheduling Information  To schedule your CT/MRI scan, please contact [facility] at [phone] OR [facility] at [phone]    To schedule your Surgery, please contact our Specialty Schedulers at [phone]      ENT Clinic Locations Clinic Hours Telephone Number     [facility]  [address]  [place] [address]   Monday:           1:00pm -- 5:00pm    Friday:              8:00am - 12:00pm   To schedule/reschedule an appointment with   Dr. Valentino,   please contact our   Specialty Scheduling Department at:     [phone]       [facility] [place]  [address] [place] [address] Tuesday:          8:00am -- 2:00pm         Urgent Care Locations Clinic Hours Telephone Numbers     [facility] [place]  [address] [place] [address]     Monday-Friday:     11:00am - 9:00pm    Saturday-Sunday:  9:00am - 5:00pm   [phone]     [facility]  [address] [place] [address]     Monday-Friday:      5:00pm - 9:00pm     Saturday-Sunday:  9:00am - 5:00pm   [phone]                 Follow-ups after your visit        Your next 10 appointments already scheduled     Apr 20, 2018  8:45 AM CDT   Return Visit with Alex Valentino MD   [facility] ([facility])    [address] [place] [id number] [phone]              Who to contact     If you have questions or need follow up information about today's clinic visit or your schedule please contact [facility] directly at [phone].  Normal or non-critical lab and  "imaging results will be communicated to you by MyChart, letter or phone within 4 business days after the clinic has received the results. If you do not hear from us within 7 days, please contact the clinic through [employer] or phone. If you have a critical or abnormal lab result, we will notify you by phone as soon as possible.  Submit refill requests through [employer] or call your pharmacy and they will forward the refill request to us. Please allow 3 business days for your refill to be completed.          Additional Information About Your Visit        [employer] Information     [employer] lets you send messages to your doctor, view your test results, renew your prescriptions, schedule appointments and more. To sign up, go to [email or web address] . Click on \"Log in\" on the left side of the screen, which will take you to the Welcome page. Then click on \"Sign up Now\" on the right side of the page.     You will be asked to enter the access code listed below, as well as some personal information. Please follow the directions to create your username and password.     Your access code is: [email or web address]  Expires: 3/26/2018  5:36 PM     Your access code will  in 90 days. If you need help or a new code, please call your [facility] clinic or [phone].        Care EveryWhere ID     This is your Care EveryWhere ID. This could be used by other organizations to access your [facility] medical records  [id number]        Your Vitals Were     Pulse Respirations Height Pulse Oximetry BMI (Body Mass Index)       51 12 1.689 m (5' 6.5\") 100% 24.17 kg/m2        Blood Pressure from Last 3 Encounters:   18 119/71   18 112/74   17 122/72    Weight from Last 3 Encounters:   18 68.9 kg (152 lb)   18 71.7 kg (158 lb)   17 67.4 kg (148 lb 8 oz)              Today, you had the following     No orders found for display         Today's Medication Changes          These changes are accurate as of 3/19/18  5:10 " PM.  If you have any questions, ask your nurse or doctor.               Start taking these medicines.        Dose/Directions    chlorhexidine 0.12 % solution   Commonly known as:  PERIDEX   Used for:  Tonsil asymmetry, Hypertrophy of tonsils   Started by:  Alex Valentino MD        Dose:  15 mL   Swish and spit 15 mLs in mouth 2 times daily for 7 days   Quantity:  210 mL   Refills:  1            Where to get your medicines      These medications [place] [facility] [place] [address]  [address], [facility] [address]     Phone:  [phone]     chlorhexidine 0.12 % solution                Primary Care Provider Office Phone # Fax #    Rossana Ortiz [phone] [address] [facility] [address]        Equal Access to Services     [facility]: Hadii aad ku hadasho Soomaali, waaxda luqadaha, qaybta kaalmada adeegyada, brii willis hayaatata nicolas . So [facility] [phone].    ATENCIÓN: Si habla español, tiene a dsouza disposición servicios gratuitos de asistencia lingüística. Llame al [phone].    We comply with applicable federal civil rights laws and [place] laws. We do not discriminate on the basis of race, color, national origin, age, disability, sex, sexual orientation, or gender identity.            Thank you!     Thank you for choosing [facility]  for your care. Our goal is always to provide you with excellent care. Hearing back from our patients is one way we can continue to improve our services. Please take a few minutes to complete the written survey that you may receive in the mail after your visit with us. Thank you!             Your Updated Medication List - Protect others around you: Learn how to safely use, store and throw away your medicines at www.disposemymeds.org.          This list is accurate as of 3/19/18  5:10 PM.  Always use your most recent med list.                   Brand  Name Dispense Instructions for use Diagnosis    chlorhexidine 0.12 % solution    PERIDEX    210 mL    Swish and spit 15 mLs in mouth 2 times daily for 7 days    Tonsil asymmetry, Hypertrophy of tonsils       escitalopram 10 MG tablet    LEXAPRO    90 tablet    Take 1 tablet (10 mg) by mouth daily    SHERRI (generalized anxiety disorder)       levonorgestrel 20 MCG/24HR IUD    MIRENA     1 each by Intrauterine route once

## 2018-04-20 ENCOUNTER — TELEPHONE (OUTPATIENT)
Dept: OTOLARYNGOLOGY | Facility: CLINIC | Age: 28
End: 2018-04-20

## 2018-04-20 ENCOUNTER — OFFICE VISIT (OUTPATIENT)
Dept: OTOLARYNGOLOGY | Facility: CLINIC | Age: 28
End: 2018-04-20
Payer: COMMERCIAL

## 2018-04-20 VITALS
OXYGEN SATURATION: 100 % | HEART RATE: 76 BPM | DIASTOLIC BLOOD PRESSURE: 71 MMHG | BODY MASS INDEX: 23.54 KG/M2 | SYSTOLIC BLOOD PRESSURE: 111 MMHG | RESPIRATION RATE: 12 BRPM | HEIGHT: 67 IN | WEIGHT: 150 LBS

## 2018-04-20 DIAGNOSIS — J35.1 HYPERTROPHY OF TONSILS: ICD-10-CM

## 2018-04-20 DIAGNOSIS — J35.8 TONSILLAR MASS: ICD-10-CM

## 2018-04-20 DIAGNOSIS — J35.8 TONSIL ASYMMETRY: Primary | ICD-10-CM

## 2018-04-20 PROCEDURE — 99214 OFFICE O/P EST MOD 30 MIN: CPT | Performed by: OTOLARYNGOLOGY

## 2018-04-20 NOTE — TELEPHONE ENCOUNTER
04/20/2018 LEFT A MESSAGE FOR PATIENT TO CALL AND SCHEDULE, DR CHRIS IS WORKING PATIENT IN I HAVE CONFIRMED SURGERY WITH JOHNNA @[facility] FOR 04/30/2018 AT 11:00.[facility]

## 2018-04-20 NOTE — PROGRESS NOTES
History of Present Illness - Meredith Mello is a very pleasant 27 year old female here in follow up from 3/19/2018 referred to me by Ashley Hernández due to a tonsil issue.  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  On looking in, the RIGHT tonsil was very swollen, and she could feel a foreign body sensation.  However, the tonsil was not red or purulent at all. Prior to this, there had been no change in swallowing, solids or liquids.  No change in voice, no change in exercise tolerance.  No other changes in her health, no night sweats, no unplanned changes in weight.    No previous ENT surgery.    On exam, I found that the RIGHT tonsil was indeed extremely large, the LEFT was normal.  However, there was signficant exudative crypts and tonsil stones of the RIGHT side, and I placed her on peridex to try and clear this possible microabscess.  She is here in follow up.    Past Medical History -   Patient Active Problem List   Diagnosis     CARDIOVASCULAR SCREENING; LDL GOAL LESS THAN 160     SHERRI (generalized anxiety disorder)     Tonsil asymmetry       Current Medications -   Current Outpatient Prescriptions:      escitalopram (LEXAPRO) 10 MG tablet, Take 1 tablet (10 mg) by mouth daily, Disp: 90 tablet, Rfl: 3     levonorgestrel (MIRENA) 20 MCG/24HR IUD, 1 each by Intrauterine route once, Disp: , Rfl:     Allergies -   Allergies   Allergen Reactions     Gluten Meal Diarrhea and Rash       Social History -   Social History     Social History     Marital status: Single     Spouse name: N/A     Number of children: 0     Years of education: 16     Occupational History     music therapy  [employer]      Student     Social History Main Topics     Smoking status: Never Smoker     Smokeless tobacco: Never Used     Alcohol use Yes     Drug use: Yes     Special: Marijuana     Sexual activity: Yes     Partners: Male     Birth control/ protection: IUD     Other Topics Concern  "    None     Social History Narrative       Family History - No family history on file.    Review of Systems - As per HPI and PMHx, otherwise 10+ system review of the head and neck, and general constitution is negative.    Physical Exam  B/P: 119/71, T: Data Unavailable, P: 51, R: 12  Vitals: /71  Pulse 76  Resp 12  Ht 1.689 m (5' 6.5\")  Wt 68 kg (150 lb)  SpO2 100%  BMI 23.85 kg/m2  BMI= Body mass index is 23.85 kg/(m^2).    General - The patient is well nourished and well developed, and appears to have good nutritional status.  Alert and oriented to person and place, answers questions and cooperates with examination appropriately.   Head and Face - Normocephalic and atraumatic, with no gross asymmetry noted of the contour of the facial features.  The facial nerve is intact, with strong symmetric movements.  Voice and Breathing - The patient was breathing comfortably without the use of accessory muscles. There was no wheezing, stridor, or stertor.  The patients voice was clear and strong, and had appropriate pitch and quality.  Ears - The tympanic membranes are normal in appearance, bony landmarks are intact.  No retraction, perforation, or masses.  Normal mobility on valsalva maneuver, with no reports of dizziness on insuflation.  No fluid or purulence was seen in the external canal or the middle ear. No evidence of infection of the middle ear or external canal, cerumen was normal in appearance.  Eyes - Extraocular movements intact, and the pupils were reactive to light.  Sclera were not icteric or injected, conjunctiva were pink and moist.  Mouth - Examination of the oral cavity showed pink, healthy oral mucosa. No lesions or ulcerations noted.  The tongue was mobile and midline, and the dentition were in good condition.    Throat - The walls of the oropharynx were smooth, pink, moist, symmetric, and had no lesions or ulcerations.  The tonsillar pillars and soft palate were symmetric.  The uvula was " midline on elevation.  The RIGHT tonsil is definitely large, about 3+, and with a mirror there was no longer any purulence or erythma.  The LEFT tonsil is normal adult size, 1, and looks normal.  ON palpation, the RIGHT tonsil is slightly firm in the mid fossa.  Neck - Normal midline excursion of the laryngotracheal complex during swallowing.  Full range of motion on passive movement.  Palpation of the occipital, submental, submandibular, internal jugular chain, and supraclavicular nodes did not demonstrate any abnormal lymph nodes or masses.  The carotid pulse was palpable bilaterally.  Palpation of the thyroid was soft and smooth, with no nodules or goiter appreciated.  The trachea was mobile and midline.  Nose - External contour is symmetric, no gross deflection or scars.  Nasal mucosa is pink and moist with no abnormal mucus.  The septum was midline and non-obstructive, turbinates of normal size and position.  No polyps, masses, or purulence noted on examination.      A/P - Meredith Mello is a 27 year old female  (J35.8) Tonsil asymmetry  (primary encounter diagnosis)  (J35.1) Hypertrophy of tonsils    Given the texture and appearance, as well as the history and lack of any resolution, as well as a day of transient ear pain, I think we need to perform tonsillectomy to rule out malignancy.    Based on the physical exam and history, my recommendation is for tonsillectomy (without adenoidectomy).  The remainder of the visit was spent discussing the risks and benefits of tonsillectomy.  These included:  The risks of general anesthesia, bleeding, infection, possible need for emergency surgery to control bleeding, possible alteration of speech and swallowing, and even the possibility of continued throat problems following surgery.  They understood and wished to call in and schedule.

## 2018-04-20 NOTE — PATIENT INSTRUCTIONS
Scheduling Information  To schedule your CT/MRI scan, please contact [facility] at [phone] OR [facility] at [phone]    To schedule your Surgery, please contact our Specialty Schedulers at [phone]      ENT Clinic Locations Clinic Hours Telephone Number     [facility]  [address] [place] [address]   Monday:           1:00pm -- 5:00pm    Friday:              8:00am - 12:00pm   To schedule/reschedule an appointment with   Dr. Valentino,   please contact our   Specialty Scheduling Department at:     [phone]       [facility]  [address] [place] [address] Tuesday:          8:00am -- 2:00pm         Urgent Care Locations Clinic Hours Telephone Numbers     [facility]  [address] [place] [address]     Monday-Friday:     11:00am - 9:00pm    Saturday-Sunday:  9:00am - 5:00pm   [phone]     [facility]  [address] [place] [address]     Monday-Friday:      5:00pm - 9:00pm     Saturday-Sunday:  9:00am - 5:00pm   [phone]

## 2018-04-20 NOTE — LETTER
4/20/2018         RE: Meredith Mello  [address]  [facility] [address]        Dear Colleague,    Thank you for referring your patient, Meredith Mello, to the [facility]. Please see a copy of my visit note below.    History of Present Illness - Meredith Mello is a very pleasant 27 year old female here in follow up from 3/19/2018 referred to me by Ashley Hernández due to a tonsil issue.  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  On looking in, the RIGHT tonsil was very swollen, and she could feel a foreign body sensation.  However, the tonsil was not red or purulent at all. Prior to this, there had been no change in swallowing, solids or liquids.  No change in voice, no change in exercise tolerance.  No other changes in her health, no night sweats, no unplanned changes in weight.    No previous ENT surgery.    On exam, I found that the RIGHT tonsil was indeed extremely large, the LEFT was normal.  However, there was signficant exudative crypts and tonsil stones of the RIGHT side, and I placed her on peridex to try and clear this possible microabscess.  She is here in follow up.    Past Medical History -   Patient Active Problem List   Diagnosis     CARDIOVASCULAR SCREENING; LDL GOAL LESS THAN 160     SHERRI (generalized anxiety disorder)     Tonsil asymmetry       Current Medications -   Current Outpatient Prescriptions:      escitalopram (LEXAPRO) 10 MG tablet, Take 1 tablet (10 mg) by mouth daily, Disp: 90 tablet, Rfl: 3     levonorgestrel (MIRENA) 20 MCG/24HR IUD, 1 each by Intrauterine route once, Disp: , Rfl:     Allergies -   Allergies   Allergen Reactions     Gluten Meal Diarrhea and Rash       Social History -   Social History     Social History     Marital status: Single     Spouse name: N/A     Number of children: 0     Years of education: 16     Occupational History     music therapy  [employer]      Student     Social History  "Main Topics     Smoking status: Never Smoker     Smokeless tobacco: Never Used     Alcohol use Yes     Drug use: Yes     Special: Marijuana     Sexual activity: Yes     Partners: Male     Birth control/ protection: IUD     Other Topics Concern     None     Social History Narrative       Family History - No family history on file.    Review of Systems - As per HPI and PMHx, otherwise 10+ system review of the head and neck, and general constitution is negative.    Physical Exam  B/P: 119/71, T: Data Unavailable, P: 51, R: 12  Vitals: /71  Pulse 76  Resp 12  Ht 1.689 m (5' 6.5\")  Wt 68 kg (150 lb)  SpO2 100%  BMI 23.85 kg/m2  BMI= Body mass index is 23.85 kg/(m^2).    General - The patient is well nourished and well developed, and appears to have good nutritional status.  Alert and oriented to person and place, answers questions and cooperates with examination appropriately.   Head and Face - Normocephalic and atraumatic, with no gross asymmetry noted of the contour of the facial features.  The facial nerve is intact, with strong symmetric movements.  Voice and Breathing - The patient was breathing comfortably without the use of accessory muscles. There was no wheezing, stridor, or stertor.  The patients voice was clear and strong, and had appropriate pitch and quality.  Ears - The tympanic membranes are normal in appearance, bony landmarks are intact.  No retraction, perforation, or masses.  Normal mobility on valsalva maneuver, with no reports of dizziness on insuflation.  No fluid or purulence was seen in the external canal or the middle ear. No evidence of infection of the middle ear or external canal, cerumen was normal in appearance.  Eyes - Extraocular movements intact, and the pupils were reactive to light.  Sclera were not icteric or injected, conjunctiva were pink and moist.  Mouth - Examination of the oral cavity showed pink, healthy oral mucosa. No lesions or ulcerations noted.  The tongue was " mobile and midline, and the dentition were in good condition.    Throat - The walls of the oropharynx were smooth, pink, moist, symmetric, and had no lesions or ulcerations.  The tonsillar pillars and soft palate were symmetric.  The uvula was midline on elevation.  The RIGHT tonsil is definitely large, about 3+, and with a mirror there was no longer any purulence or erythma.  The LEFT tonsil is normal adult size, 1, and looks normal.  ON palpation, the RIGHT tonsil is slightly firm in the mid fossa.  Neck - Normal midline excursion of the laryngotracheal complex during swallowing.  Full range of motion on passive movement.  Palpation of the occipital, submental, submandibular, internal jugular chain, and supraclavicular nodes did not demonstrate any abnormal lymph nodes or masses.  The carotid pulse was palpable bilaterally.  Palpation of the thyroid was soft and smooth, with no nodules or goiter appreciated.  The trachea was mobile and midline.  Nose - External contour is symmetric, no gross deflection or scars.  Nasal mucosa is pink and moist with no abnormal mucus.  The septum was midline and non-obstructive, turbinates of normal size and position.  No polyps, masses, or purulence noted on examination.      A/P - Meredith Mello is a 27 year old female  (J35.8) Tonsil asymmetry  (primary encounter diagnosis)  (J35.1) Hypertrophy of tonsils    Given the texture and appearance, as well as the history and lack of any resolution, as well as a day of transient ear pain, I think we need to perform tonsillectomy to rule out malignancy.    Based on the physical exam and history, my recommendation is for tonsillectomy (without adenoidectomy).  The remainder of the visit was spent discussing the risks and benefits of tonsillectomy.  These included:  The risks of general anesthesia, bleeding, infection, possible need for emergency surgery to control bleeding, possible alteration of speech and swallowing, and even the  possibility of continued throat problems following surgery.  They understood and wished to call in and schedule.      Again, thank you for allowing me to participate in the care of your patient.        Sincerely,        Alex Valentino MD

## 2018-04-23 NOTE — TELEPHONE ENCOUNTER
Patient chose to do surgery in June. Cancelled 4-30-18 surgery.  Type of surgery: tonsillectomy,bilateral CPT 87964  Tonsil asymmetry [J35.8]  - Primary       Hypertrophy of tonsils [J35.1]       Tonsillar mass [R22.0]         Location of surgery: [facility]  Date and time of surgery: 6-12-18  TBD  Surgeon: Dr Valentino  Pre-Op Appt Date: 5-29-18  Post-Op Appt Date: 6-25-18   Packet sent out: Yes  Pre-cert/Authorization completed:no pre cert needed   Date: 04/23/2018

## 2018-05-29 ENCOUNTER — OFFICE VISIT (OUTPATIENT)
Dept: FAMILY MEDICINE | Facility: CLINIC | Age: 28
End: 2018-05-29
Payer: COMMERCIAL

## 2018-05-29 VITALS
BODY MASS INDEX: 24.33 KG/M2 | SYSTOLIC BLOOD PRESSURE: 124 MMHG | OXYGEN SATURATION: 100 % | HEART RATE: 60 BPM | WEIGHT: 155 LBS | DIASTOLIC BLOOD PRESSURE: 70 MMHG | RESPIRATION RATE: 20 BRPM | TEMPERATURE: 98.5 F | HEIGHT: 67 IN

## 2018-05-29 DIAGNOSIS — Z01.818 PREOP GENERAL PHYSICAL EXAM: Primary | ICD-10-CM

## 2018-05-29 DIAGNOSIS — J35.8 TONSIL ASYMMETRY: ICD-10-CM

## 2018-05-29 LAB
BETA HCG QUAL IFA URINE: NEGATIVE
HGB BLD-MCNC: 13.7 G/DL (ref 11.7–15.7)

## 2018-05-29 PROCEDURE — 36415 COLL VENOUS BLD VENIPUNCTURE: CPT | Performed by: FAMILY MEDICINE

## 2018-05-29 PROCEDURE — 85018 HEMOGLOBIN: CPT | Performed by: FAMILY MEDICINE

## 2018-05-29 PROCEDURE — 99214 OFFICE O/P EST MOD 30 MIN: CPT | Performed by: FAMILY MEDICINE

## 2018-05-29 PROCEDURE — 84703 CHORIONIC GONADOTROPIN ASSAY: CPT | Performed by: FAMILY MEDICINE

## 2018-05-29 ASSESSMENT — PAIN SCALES - GENERAL: PAINLEVEL: NO PAIN (0)

## 2018-05-29 NOTE — PROGRESS NOTES
[facility]  [address] [place] [id number]  [phone]  Dept: [phone]    PRE-OP EVALUATION:  Today's date: 2018    Meredith Mello (: 1990) presents for pre-operative evaluation assessment as requested by Dr. Valentino.  She requires evaluation and anesthesia risk assessment prior to undergoing surgery/procedure for treatment of tonsillar hypertrophy with Bilateral tonsillectomy.    Proposed Surgery/ Procedure:   TONSILLECTOMY Bilateral General   Bilateral tonsillectomy         Date of Surgery/ Procedure: 2018  Time of Surgery/ Procedure: 11:45 am  Hospital/Surgical Facility: [facility]   Fax number for surgical facility:    Primary Physician: Rossana Ortiz  Type of Anesthesia Anticipated: General    Patient has a Health Care Directive or Living Will:  NO    1. NO - Do you have a history of heart attack, stroke, stent, bypass or surgery on an artery in the head, neck, heart or legs?  2. NO - Do you ever have any pain or discomfort in your chest?  3. NO - Do you have a history of  Heart Failure?  4. NO - Are you troubled by shortness of breath when: walking on the level, up a slight hill or at night?  5. NO - Do you currently have a cold, bronchitis or other respiratory infection?  6. NO - Do you have a cough, shortness of breath or wheezing?  7. NO - Do you sometimes get pains in the calves of your legs when you walk?  8. NO - Do you or anyone in your family have previous history of blood clots?  9. NO - Do you or does anyone in your family have a serious bleeding problem such as prolonged bleeding following surgeries or cuts?  10. NO - Have you ever had problems with anemia or been told to take iron pills?  11. NO - Have you had any abnormal blood loss such as black, tarry or bloody stools, or abnormal vaginal bleeding?  12. NO - Have you ever had a blood transfusion?  13. NO - Have you or any of your relatives ever had problems with anesthesia?  14. NO - Do you  have sleep apnea, excessive snoring or daytime drowsiness?  15. NO - Do you have any prosthetic heart valves?  16. NO - Do you have prosthetic joints?  17. NO - Is there any chance that you may be pregnant?    HPI:   Meredith Mello is a 27 year old female who presents with tonsillar hypertrophy and asymmetry with sore throat. Symptom onset has been sudden, unchanged for a time period of 2 months. Severity is described as moderate. Course of her symptoms over time is unchanged.    See problem list for active medical problems.  Problems all longstanding and stable, except as noted/documented.  See ROS for pertinent symptoms related to these conditions.                                                                                                                                                          .    MEDICAL HISTORY:     Patient Active Problem List    Diagnosis Date Noted     Tonsil asymmetry 03/19/2018     Priority: Medium     SHERRI (generalized anxiety disorder)      Priority: Medium     CARDIOVASCULAR SCREENING; LDL GOAL LESS THAN 160 01/11/2017     Priority: Low      Past Medical History:   Diagnosis Date     SHERRI (generalized anxiety disorder)      Past Surgical History:   Procedure Laterality Date     NO HISTORY OF SURGERY       Current Outpatient Prescriptions   Medication Sig Dispense Refill     levonorgestrel (MIRENA) 20 MCG/24HR IUD 1 each by Intrauterine route once       OTC products: None, except as noted above    Allergies   Allergen Reactions     Gluten Meal Diarrhea and Rash      Latex Allergy: NO    Social History   Substance Use Topics     Smoking status: Never Smoker     Smokeless tobacco: Never Used     Alcohol use Yes     History   Drug Use     Yes     Special: Marijuana     Social History     Social History     Marital status: Single     Spouse name: N/A     Number of children: 0     Years of education: 16     Occupational History     music therapy  [employer]     Social History Main Topics      "Smoking status: Never Smoker     Smokeless tobacco: Never Used     Alcohol use Yes     Drug use: Yes     Special: Marijuana     Sexual activity: Yes     Partners: Male     Birth control/ protection: IUD     Other Topics Concern     Not on file     Social History Narrative     Family History   Problem Relation Age of Onset     Hypertension Father      Hyperlipidemia Maternal Grandmother      Coronary Artery Disease No family hx of      DIABETES No family hx of       REVIEW OF SYSTEMS:   CONSTITUTIONAL: NEGATIVE for fever, chills, change in weight  INTEGUMENTARY/SKIN: NEGATIVE for worrisome rashes, moles or lesions  EYES: NEGATIVE for vision changes or irritation  ENT/MOUTH: see HPI   RESP: NEGATIVE for significant cough or SOB  BREAST: NEGATIVE for masses, tenderness or discharge  CV: NEGATIVE for chest pain, palpitations or peripheral edema  GI: NEGATIVE for nausea, abdominal pain, heartburn, or change in bowel habits  : NEGATIVE for frequency, dysuria, or hematuria  MUSCULOSKELETAL: NEGATIVE for significant arthralgias or myalgia  NEURO: NEGATIVE for weakness, dizziness or paresthesias  ENDOCRINE: NEGATIVE for temperature intolerance, skin/hair changes  HEME: NEGATIVE for bleeding problems  PSYCHIATRIC: NEGATIVE for changes in mood or affect  Complete ROS otherwise negative.     EXAM:   /70  Pulse 60  Temp 98.5  F (36.9  C) (Oral)  Resp 20  Ht 5' 6.53\" (1.69 m)  Wt 155 lb (70.3 kg)  SpO2 100%  Breastfeeding? No  BMI 24.62 kg/m2    GENERAL APPEARANCE: healthy, alert and no distress     EYES: EOMI, PERRL     HENT: ear canals and TM's normal and nose and mouth without ulcers or lesions     NECK: no adenopathy, no asymmetry, masses, or scars and thyroid normal to palpation     RESP: lungs clear to auscultation - no rales, rhonchi or wheezes     CV: regular rates and rhythm, normal S1 S2, no S3 or S4 and no murmur, click or rub     ABDOMEN:  soft, nontender, no HSM or masses and bowel sounds normal     " MS: extremities normal- no gross deformities noted, no evidence of inflammation in joints, FROM in all extremities.     SKIN: no suspicious lesions or rashes     NEURO: Normal strength and tone, sensory exam grossly normal, mentation intact and speech normal     PSYCH: mentation appears normal. and affect normal/bright     LYMPHATICS: No cervical adenopathy    DIAGNOSTICS:   EKG: Not indicated due to non-vascular surgery and low risk of event (age <65 and without cardiac risk factors)    No results for input(s): HGB, PLT, INR, NA, POTASSIUM, CR, A1C in the last 00563 hours.     IMPRESSION:   Reason for surgery/procedure: tonsillar hypertrophy   Diagnosis/reason for consult: same     The proposed surgical procedure is considered INTERMEDIATE risk.    REVISED CARDIAC RISK INDEX  The patient has the following serious cardiovascular risks for perioperative complications such as (MI, PE, VFib and 3  AV Block):  No serious cardiac risks  INTERPRETATION: 0 risks: Class I (very low risk - 0.4% complication rate)    The patient has the following additional risks for perioperative complications:  No identified additional risks      ICD-10-CM    1. Preop general physical exam Z01.818 Hemoglobin     Beta HCG Qual, Urine - [facility] ([id number])   2. Tonsil asymmetry J35.8        RECOMMENDATIONS:   --Patient is to take all scheduled medications on the day of surgery EXCEPT for modifications listed below.    APPROVAL GIVEN to proceed with proposed procedure, without further diagnostic evaluation       Signed Electronically by: Rossana Ortiz MD    Copy of this evaluation report is provided to requesting physician.    [facility] Preop Guidelines    Revised Cardiac Risk Index

## 2018-05-29 NOTE — PATIENT INSTRUCTIONS
[facility]    If you have any questions regarding to your visit please contact your care team:       Team Red:   Clinic Hours Telephone Number   Dr. Rossana Bob, NP   7am-7pm  Monday - Thursday   7am-5pm  Fridays  [phone]  (Appointment scheduling available 24/7)    Questions about your recent visit?   Team Line  [phone]   Urgent Care - [place] and [facility] - 11am-9pm Monday-Friday Saturday-Sunday- 9am-5pm   [place] - 5pm-9pm Monday-Friday Saturday-Sunday- 9am-5pm  [phone] - [place]  [phone] - [place]       What options do I have for a visit other than an office visit? We offer electronic visits (e-visits) and telephone visits, when medically appropriate.  Please check with your medical insurance to see if these types of visits are covered, as you will be responsible for any charges that are not paid by your insurance.      You can use [employer] (secure electronic communication) to access to your chart, send your primary care provider a message, or make an appointment. Ask a team member how to get started.     For a price quote for your services, please call our Consumer Price Line at [phone] or our Imaging Cost estimation line at [phone] (for imaging tests).      Before Your Surgery      Call your surgeon if there is any change in your health. This includes signs of a cold or flu (such as a sore throat, runny nose, cough, rash or fever).    Do not smoke, drink alcohol or take over the counter medicine (unless your surgeon or primary care doctor tells you to) for the 24 hours before and after surgery.    If you take prescribed drugs: Follow your doctor s orders about which medicines to take and which to stop until after surgery.    Eating and drinking prior to surgery: follow the instructions from your surgeon    Take a shower or bath the night before surgery. Use the soap your surgeon gave you  to gently clean your skin. If you do not have soap from your surgeon, use your regular soap. Do not shave or scrub the surgery site.  Wear clean pajamas and have clean sheets on your bed.     Discharged By:  ALONDRA SCHWAB

## 2018-05-29 NOTE — MR AVS SNAPSHOT
After Visit Summary   5/29/2018    Meredith Mello    MRN: [id number]           Patient Information     Date Of Birth          1990        Visit Information        Provider Department      5/29/2018 6:20 PM Rossana Ortiz MD [facility]        Today's Diagnoses     Preop general physical exam    -  1    Tonsil asymmetry          Care Instructions    [facility]    If you have any questions regarding to your visit please contact your care team:       Team Red:   Clinic Hours Telephone Number   Dr. Rossana Bob, NP   7am-7pm  Monday - Thursday   7am-5pm  Fridays  [phone]  (Appointment scheduling available 24/7)    Questions about your recent visit?   Team Line  [phone]   Urgent Care - [place] and [facility] - 11am-9pm Monday-Friday Saturday-Sunday- 9am-5pm   [place] - 5pm-9pm Monday-Friday Saturday-Sunday- 9am-5pm  [phone] - [place]  [phone] - [place]       What options do I have for a visit other than an office visit? We offer electronic visits (e-visits) and telephone visits, when medically appropriate.  Please check with your medical insurance to see if these types of visits are covered, as you will be responsible for any charges that are not paid by your insurance.      You can use [employer] (secure electronic communication) to access to your chart, send your primary care provider a message, or make an appointment. Ask a team member how to get started.     For a price quote for your services, please call our Consumer Price Line at [phone] or our Imaging Cost estimation line at [phone] (for imaging tests).      Before Your Surgery      Call your surgeon if there is any change in your health. This includes signs of a cold or flu (such as a sore throat, runny nose, cough, rash or fever).    Do not smoke, drink alcohol or take over the counter medicine (unless your  surgeon or primary care doctor tells you to) for the 24 hours before and after surgery.    If you take prescribed drugs: Follow your doctor s orders about which medicines to take and which to stop until after surgery.    Eating and drinking prior to surgery: follow the instructions from your surgeon    Take a shower or bath the night before surgery. Use the soap your surgeon gave you to gently clean your skin. If you do not have soap from your surgeon, use your regular soap. Do not shave or scrub the surgery site.  Wear clean pajamas and have clean sheets on your bed.     Discharged By:  ALONDRA SCHWAB            Follow-ups after your visit        Follow-up notes from your care team     Return if symptoms worsen or fail to improve.      Your next 10 appointments already scheduled     Jun 12, 2018   Procedure with Alex Valentino MD   [facility] (--)    [address]  [facility] [address]   [phone]            Jun 25, 2018  5:00 PM CDT   Post Op with Alex [facility]ino MD   [facility]    [address] [place] [id number] [phone]              Who to contact     If you have questions or need follow up information about today's clinic visit or your schedule please contact [facility] directly at [phone].  Normal or non-critical lab and imaging results will be communicated to you by MyChart, letter or phone within 4 business days after the clinic has received the results. If you do not hear from us within 7 days, please contact the clinic through MyChart or phone. If you have a critical or abnormal lab result, we will notify you by phone as soon as possible.  Submit refill requests through [employer] or call your pharmacy and they will forward the refill request to us. Please allow 3 business days for your refill to be completed.          Additional Information About Your Visit        Care EveryWhere ID     This is  "your Care EveryWhere ID. This could be used by other organizations to access your [facility] medical records  [id number]        Your Vitals Were     Pulse Temperature Respirations Height Pulse Oximetry Breastfeeding?    60 98.5  F (36.9  C) (Oral) 20 5' 6.53\" (1.69 m) 100% No    BMI (Body Mass Index)                   24.62 kg/m2            Blood Pressure from Last 3 Encounters:   05/29/18 124/70   04/20/18 111/71   03/19/18 119/71    Weight from Last 3 Encounters:   05/29/18 155 lb (70.3 kg)   04/20/18 150 lb (68 kg)   03/19/18 152 lb (68.9 kg)              We Performed the Following     Beta HCG Qual, Urine - [facility] ([id number])     Hemoglobin        Primary Care Provider Office Phone # Fax #    Rossana Ortiz [phone]       [address] [facility] [address]        Equal Access to Services     [facility]: Hadii aad ku hadasho Soomaali, waaxda luqadaha, qaybta kaalmada adeegyada, waxay idiin hayaan adeeg kharajustin la'víctorn . So [facility] [phone].    ATENCIÓN: Si habla español, tiene a dsouza disposición servicios gratuitos de asistencia lingüística. Llame al [phone].    We comply with applicable federal civil rights laws and [place] laws. We do not discriminate on the basis of race, color, national origin, age, disability, sex, sexual orientation, or gender identity.            Thank you!     Thank you for choosing [facility]  for your care. Our goal is always to provide you with excellent care. Hearing back from our patients is one way we can continue to improve our services. Please take a few minutes to complete the written survey that you may receive in the mail after your visit with us. Thank you!             Your Updated Medication List - Protect others around you: Learn how to safely use, store and throw away your medicines at www.disposemymeds.org.          This list is accurate as of 5/29/18  6:53 PM.  Always use your most recent med list.             "       Brand Name Dispense Instructions for use Diagnosis    levonorgestrel 20 MCG/24HR IUD    MIRENA     1 each by Intrauterine route once

## 2018-05-29 NOTE — LETTER
[facility]  [address] [place] [address]    May 30, 2018    Meredith Mello  [address] [place] [address]      Dear Meredith,    Your results are normal.     Enclosed is a copy of your results.   Results for orders placed or performed in visit on 05/29/18   Hemoglobin   Result Value Ref Range    Hemoglobin 13.7 11.7 - 15.7 g/dL   Beta HCG Qual, Urine - [facility] ([id number])   Result Value Ref Range    Beta HCG Qual IFA Urine Negative NEG^Negative          If you have any questions or concerns, please call myself or my nurse at [phone].    Sincerely,    Rossana Ortiz MD/dalia

## 2018-06-11 ENCOUNTER — ANESTHESIA EVENT (OUTPATIENT)
Dept: SURGERY | Facility: AMBULATORY SURGERY CENTER | Age: 28
End: 2018-06-11

## 2018-06-12 ENCOUNTER — SURGERY (OUTPATIENT)
Age: 28
End: 2018-06-12

## 2018-06-12 ENCOUNTER — HOSPITAL ENCOUNTER (OUTPATIENT)
Facility: AMBULATORY SURGERY CENTER | Age: 28
Discharge: HOME | End: 2018-06-12
Attending: OTOLARYNGOLOGY | Admitting: OTOLARYNGOLOGY
Payer: COMMERCIAL

## 2018-06-12 ENCOUNTER — ANESTHESIA (OUTPATIENT)
Dept: SURGERY | Facility: AMBULATORY SURGERY CENTER | Age: 28
End: 2018-06-12
Payer: COMMERCIAL

## 2018-06-12 VITALS
OXYGEN SATURATION: 100 % | BODY MASS INDEX: 24.33 KG/M2 | WEIGHT: 155 LBS | SYSTOLIC BLOOD PRESSURE: 113 MMHG | RESPIRATION RATE: 16 BRPM | TEMPERATURE: 97.8 F | DIASTOLIC BLOOD PRESSURE: 76 MMHG | HEIGHT: 67 IN

## 2018-06-12 DIAGNOSIS — J35.8 TONSIL ASYMMETRY: Primary | ICD-10-CM

## 2018-06-12 DIAGNOSIS — G89.18 ACUTE POST-OPERATIVE PAIN: ICD-10-CM

## 2018-06-12 DIAGNOSIS — J35.01 TONSILLITIS, CHRONIC: ICD-10-CM

## 2018-06-12 LAB — BETA HCG QUAL IFA URINE: NEGATIVE

## 2018-06-12 PROCEDURE — 84703 CHORIONIC GONADOTROPIN ASSAY: CPT | Performed by: ANESTHESIOLOGY

## 2018-06-12 PROCEDURE — 42826 REMOVAL OF TONSILS: CPT | Performed by: OTOLARYNGOLOGY

## 2018-06-12 PROCEDURE — G8916 PT W IV AB GIVEN ON TIME: HCPCS

## 2018-06-12 PROCEDURE — 88304 TISSUE EXAM BY PATHOLOGIST: CPT | Performed by: OTOLARYNGOLOGY

## 2018-06-12 PROCEDURE — 42826 REMOVAL OF TONSILS: CPT

## 2018-06-12 PROCEDURE — G8907 PT DOC NO EVENTS ON DISCHARG: HCPCS

## 2018-06-12 RX ORDER — ONDANSETRON 4 MG/1
4 TABLET, ORALLY DISINTEGRATING ORAL EVERY 30 MIN PRN
Status: DISCONTINUED | OUTPATIENT
Start: 2018-06-12 | End: 2018-06-13 | Stop reason: HOSPADM

## 2018-06-12 RX ORDER — ONDANSETRON 2 MG/ML
INJECTION INTRAMUSCULAR; INTRAVENOUS PRN
Status: DISCONTINUED | OUTPATIENT
Start: 2018-06-12 | End: 2018-06-12

## 2018-06-12 RX ORDER — OXYCODONE HYDROCHLORIDE 5 MG/1
5 TABLET ORAL EVERY 4 HOURS PRN
Qty: 30 TABLET | Refills: 0 | Status: SHIPPED | OUTPATIENT
Start: 2018-06-12 | End: 2019-01-14

## 2018-06-12 RX ORDER — HYDRALAZINE HYDROCHLORIDE 20 MG/ML
2.5-5 INJECTION INTRAMUSCULAR; INTRAVENOUS EVERY 10 MIN PRN
Status: DISCONTINUED | OUTPATIENT
Start: 2018-06-12 | End: 2018-06-13 | Stop reason: HOSPADM

## 2018-06-12 RX ORDER — OXYCODONE HYDROCHLORIDE 5 MG/1
5 TABLET ORAL EVERY 4 HOURS PRN
Qty: 50 TABLET | Refills: 0 | Status: SHIPPED | OUTPATIENT
Start: 2018-06-12 | End: 2019-01-14

## 2018-06-12 RX ORDER — PROPOFOL 10 MG/ML
INJECTION, EMULSION INTRAVENOUS CONTINUOUS PRN
Status: DISCONTINUED | OUTPATIENT
Start: 2018-06-12 | End: 2018-06-12

## 2018-06-12 RX ORDER — GABAPENTIN 300 MG/1
300 CAPSULE ORAL ONCE
Status: COMPLETED | OUTPATIENT
Start: 2018-06-12 | End: 2018-06-12

## 2018-06-12 RX ORDER — ALBUTEROL SULFATE 0.83 MG/ML
2.5 SOLUTION RESPIRATORY (INHALATION) EVERY 4 HOURS PRN
Status: DISCONTINUED | OUTPATIENT
Start: 2018-06-12 | End: 2018-06-13 | Stop reason: HOSPADM

## 2018-06-12 RX ORDER — PHYSOSTIGMINE SALICYLATE 1 MG/ML
1.2 INJECTION INTRAVENOUS
Status: DISCONTINUED | OUTPATIENT
Start: 2018-06-12 | End: 2018-06-13 | Stop reason: HOSPADM

## 2018-06-12 RX ORDER — SODIUM CHLORIDE, SODIUM LACTATE, POTASSIUM CHLORIDE, CALCIUM CHLORIDE 600; 310; 30; 20 MG/100ML; MG/100ML; MG/100ML; MG/100ML
INJECTION, SOLUTION INTRAVENOUS CONTINUOUS
Status: DISCONTINUED | OUTPATIENT
Start: 2018-06-12 | End: 2018-06-13 | Stop reason: HOSPADM

## 2018-06-12 RX ORDER — FENTANYL CITRATE 50 UG/ML
INJECTION, SOLUTION INTRAMUSCULAR; INTRAVENOUS PRN
Status: DISCONTINUED | OUTPATIENT
Start: 2018-06-12 | End: 2018-06-12

## 2018-06-12 RX ORDER — OXYCODONE HYDROCHLORIDE 5 MG/1
10 TABLET ORAL EVERY 4 HOURS PRN
Status: DISCONTINUED | OUTPATIENT
Start: 2018-06-12 | End: 2018-06-13 | Stop reason: HOSPADM

## 2018-06-12 RX ORDER — FENTANYL CITRATE 50 UG/ML
25-50 INJECTION, SOLUTION INTRAMUSCULAR; INTRAVENOUS
Status: DISCONTINUED | OUTPATIENT
Start: 2018-06-12 | End: 2018-06-13 | Stop reason: HOSPADM

## 2018-06-12 RX ORDER — GLYCOPYRROLATE 0.2 MG/ML
INJECTION, SOLUTION INTRAMUSCULAR; INTRAVENOUS PRN
Status: DISCONTINUED | OUTPATIENT
Start: 2018-06-12 | End: 2018-06-12

## 2018-06-12 RX ORDER — DEXAMETHASONE SODIUM PHOSPHATE 10 MG/ML
10 INJECTION, SOLUTION INTRAMUSCULAR; INTRAVENOUS ONCE
Status: COMPLETED | OUTPATIENT
Start: 2018-06-12 | End: 2018-06-12

## 2018-06-12 RX ORDER — LIDOCAINE 40 MG/G
CREAM TOPICAL
Status: DISCONTINUED | OUTPATIENT
Start: 2018-06-12 | End: 2018-06-13 | Stop reason: HOSPADM

## 2018-06-12 RX ORDER — ONDANSETRON 2 MG/ML
4 INJECTION INTRAMUSCULAR; INTRAVENOUS EVERY 30 MIN PRN
Status: DISCONTINUED | OUTPATIENT
Start: 2018-06-12 | End: 2018-06-13 | Stop reason: HOSPADM

## 2018-06-12 RX ORDER — ONDANSETRON 4 MG/1
4-8 TABLET, ORALLY DISINTEGRATING ORAL EVERY 8 HOURS PRN
Qty: 20 TABLET | Refills: 1 | Status: SHIPPED | OUTPATIENT
Start: 2018-06-12 | End: 2019-01-14

## 2018-06-12 RX ORDER — CEFAZOLIN SODIUM 1 G/3ML
1 INJECTION, POWDER, FOR SOLUTION INTRAMUSCULAR; INTRAVENOUS SEE ADMIN INSTRUCTIONS
Status: DISCONTINUED | OUTPATIENT
Start: 2018-06-12 | End: 2018-06-13 | Stop reason: HOSPADM

## 2018-06-12 RX ORDER — NALOXONE HYDROCHLORIDE 0.4 MG/ML
.1-.4 INJECTION, SOLUTION INTRAMUSCULAR; INTRAVENOUS; SUBCUTANEOUS
Status: DISCONTINUED | OUTPATIENT
Start: 2018-06-12 | End: 2018-06-13 | Stop reason: HOSPADM

## 2018-06-12 RX ORDER — PROPOFOL 10 MG/ML
INJECTION, EMULSION INTRAVENOUS PRN
Status: DISCONTINUED | OUTPATIENT
Start: 2018-06-12 | End: 2018-06-12

## 2018-06-12 RX ORDER — HYDROMORPHONE HYDROCHLORIDE 1 MG/ML
.3-.5 INJECTION, SOLUTION INTRAMUSCULAR; INTRAVENOUS; SUBCUTANEOUS EVERY 10 MIN PRN
Status: DISCONTINUED | OUTPATIENT
Start: 2018-06-12 | End: 2018-06-13 | Stop reason: HOSPADM

## 2018-06-12 RX ORDER — MEPERIDINE HYDROCHLORIDE 25 MG/ML
12.5 INJECTION INTRAMUSCULAR; INTRAVENOUS; SUBCUTANEOUS
Status: DISCONTINUED | OUTPATIENT
Start: 2018-06-12 | End: 2018-06-13 | Stop reason: HOSPADM

## 2018-06-12 RX ORDER — CEFAZOLIN SODIUM 2 G/100ML
2 INJECTION, SOLUTION INTRAVENOUS
Status: COMPLETED | OUTPATIENT
Start: 2018-06-12 | End: 2018-06-12

## 2018-06-12 RX ORDER — ACETAMINOPHEN 325 MG/1
975 TABLET ORAL ONCE
Status: COMPLETED | OUTPATIENT
Start: 2018-06-12 | End: 2018-06-12

## 2018-06-12 RX ORDER — DEXAMETHASONE SODIUM PHOSPHATE 4 MG/ML
4 INJECTION, SOLUTION INTRA-ARTICULAR; INTRALESIONAL; INTRAMUSCULAR; INTRAVENOUS; SOFT TISSUE EVERY 10 MIN PRN
Status: DISCONTINUED | OUTPATIENT
Start: 2018-06-12 | End: 2018-06-13 | Stop reason: HOSPADM

## 2018-06-12 RX ORDER — LIDOCAINE HYDROCHLORIDE 20 MG/ML
INJECTION, SOLUTION INFILTRATION; PERINEURAL PRN
Status: DISCONTINUED | OUTPATIENT
Start: 2018-06-12 | End: 2018-06-12

## 2018-06-12 RX ORDER — LIDOCAINE HYDROCHLORIDE AND EPINEPHRINE 10; 10 MG/ML; UG/ML
INJECTION, SOLUTION INFILTRATION; PERINEURAL PRN
Status: DISCONTINUED | OUTPATIENT
Start: 2018-06-12 | End: 2018-06-12 | Stop reason: HOSPADM

## 2018-06-12 RX ADMIN — FENTANYL CITRATE 50 MCG: 50 INJECTION, SOLUTION INTRAMUSCULAR; INTRAVENOUS at 12:57

## 2018-06-12 RX ADMIN — ONDANSETRON 4 MG: 2 INJECTION INTRAMUSCULAR; INTRAVENOUS at 12:59

## 2018-06-12 RX ADMIN — CEFAZOLIN SODIUM 2 G: 2 INJECTION, SOLUTION INTRAVENOUS at 12:46

## 2018-06-12 RX ADMIN — FENTANYL CITRATE 25 MCG: 50 INJECTION, SOLUTION INTRAMUSCULAR; INTRAVENOUS at 13:36

## 2018-06-12 RX ADMIN — PROPOFOL 200 MCG/KG/MIN: 10 INJECTION, EMULSION INTRAVENOUS at 12:51

## 2018-06-12 RX ADMIN — LIDOCAINE HYDROCHLORIDE 60 MG: 20 INJECTION, SOLUTION INFILTRATION; PERINEURAL at 12:49

## 2018-06-12 RX ADMIN — OXYCODONE HYDROCHLORIDE 5 MG: 5 TABLET ORAL at 13:40

## 2018-06-12 RX ADMIN — SODIUM CHLORIDE, SODIUM LACTATE, POTASSIUM CHLORIDE, CALCIUM CHLORIDE: 600; 310; 30; 20 INJECTION, SOLUTION INTRAVENOUS at 11:27

## 2018-06-12 RX ADMIN — DEXAMETHASONE SODIUM PHOSPHATE 8 MG: 10 INJECTION, SOLUTION INTRAMUSCULAR; INTRAVENOUS at 12:59

## 2018-06-12 RX ADMIN — GLYCOPYRROLATE 0.2 MG: 0.2 INJECTION, SOLUTION INTRAMUSCULAR; INTRAVENOUS at 12:57

## 2018-06-12 RX ADMIN — PROPOFOL 200 MG: 10 INJECTION, EMULSION INTRAVENOUS at 12:50

## 2018-06-12 RX ADMIN — LIDOCAINE HYDROCHLORIDE AND EPINEPHRINE 6 ML: 10; 10 INJECTION, SOLUTION INFILTRATION; PERINEURAL at 13:10

## 2018-06-12 RX ADMIN — GABAPENTIN 300 MG: 300 CAPSULE ORAL at 11:00

## 2018-06-12 RX ADMIN — FENTANYL CITRATE 50 MCG: 50 INJECTION, SOLUTION INTRAMUSCULAR; INTRAVENOUS at 12:49

## 2018-06-12 RX ADMIN — ACETAMINOPHEN 975 MG: 325 TABLET ORAL at 11:00

## 2018-06-12 NOTE — ANESTHESIA CARE TRANSFER NOTE
Patient: Meredith Mello    Procedure(s):  Bilateral tonsillectomy - Wound Class: II-Clean Contaminated    Diagnosis: Right tonsil mass  Diagnosis Additional Information: No value filed.    Anesthesia Type:   General, ETT     Note:  Airway :Face Mask  Patient transferred to:PACU  Handoff Report: Identifed the Patient, Identified the Reponsible Provider, Reviewed the pertinent medical history, Discussed the surgical course, Reviewed Intra-OP anesthesia mangement and issues during anesthesia, Set expectations for post-procedure period and Allowed opportunity for questions and acknowledgement of understanding      Vitals: (Last set prior to Anesthesia Care Transfer)    CRNA VITALS  6/12/2018 1246 - 6/12/2018 1321      6/12/2018             Pulse: 83    SpO2: 99 %    Resp Rate (observed): (!)  7                Electronically Signed By: SHAYLA Horta CRNA  June 12, 2018  1:21 PM

## 2018-06-12 NOTE — IP AVS SNAPSHOT
MRN:[id number]                      After Visit Summary   6/12/2018    Meredith Mello    MRN: [id number]           Thank you!     Thank you for choosing [facility] for your care. Our goal is always to provide you with excellent care. Hearing back from our patients is one way we can continue to improve our services. Please take a few minutes to complete the written survey that you may receive in the mail after you visit with us. Thank you!        Patient Information     Date Of Birth          1990        About your hospital stay     You were admitted on:  June 12, 2018 You last received care in the:  [facility]    You were discharged on:  June 12, 2018       Who to Call     For medical emergencies, please call 911.  For non-urgent questions about your medical care, please call your primary care provider or clinic, [phone]  For questions related to your surgery, please call your surgery clinic        Attending Provider     Provider Specialty    Alex Valentino MD Otolaryngology       Primary Care Provider Office Phone # Fax #    Rossana Ortiz [phone]      Your next 10 appointments already scheduled     Jun 25, 2018  5:00 PM CDT   Post Op with Alex Valentino MD   [facility] ([facility]    [address] [place] [id number] [phone]              Further instructions from your care team       [facility]  Same-Day Surgery   Adult Discharge Orders & Instructions   For 24 hours after surgery  1. Get plenty of rest.  A responsible adult must stay with you for at least 24 hours after you leave the hospital.   2. Do not drive or use heavy equipment.  If you have weakness or tingling, don't drive or use heavy equipment until this feeling goes away.  3. Do not drink alcohol.  4. Avoid strenuous or risky activities.  Ask for help when climbing stairs.   5. You may feel  lightheaded.  IF so, sit for a few minutes before standing.  Have someone help you get up.   6. If you have nausea (feel sick to your stomach): Drink only clear liquids such as apple juice, ginger ale, broth or 7-Up.  Rest may also help.  Be sure to drink enough fluids.  Move to a regular diet as you feel able.  7. You may have a slight fever. Call the doctor if your fever is over 100 F (37.7 C) (taken under the tongue) or lasts longer than 24 hours.  8. You may have a dry mouth, a sore throat, muscle aches or trouble sleeping.  These should go away after 24 hours.  9. Do not make important or legal decisions.   Call your doctor for any of the followin.  Signs of infection (fever, growing tenderness at the surgery site, a large amount of drainage or bleeding, severe pain, foul-smelling drainage, redness, swelling).    2. It has been over 8 to 10 hours since surgery and you are still not able to urinate (pass water).    3.  Headache for over 24 hours.    4.  Numbness, tingling or weakness the day after surgery (if you had spinal anesthesia).  To contact a doctor, call _____[phone]______________________                May Eat Should not eat   - Soft bread  - Soggy waffles or   French toast (no crusts).  Soaked in syrup  - Pancakes  - Scrambled or   poached egg   - Toast  - Crispy waffles  - Fried foods   - Oatmeal,or   Creamy cereal  - Soggy cold cereal  (soaked in milk   - Crunchy cold   cereal   - Soup  - Hot dogs  - Hamburgers  - Tender, moist  meat  - Pasta, noodles  - Spaghetti-Os  - Macaroni and  Cheese   - Tough, dry meat,  chicken or fish   - Milk  - Custard, pudding  - Ice cream  - Malts, shakes  - Yogurt (smooth)  - Cottage cheese   - Cookies  - Crackers  - Pretzels  - Chips  - Popcorn  - Nuts   - Sandwiches, (no crusts)  - Smooth peanut butter   and jelly  - Processed cheese  - Tuna - Grilled cheese  sandwiches   - Cooked vegetables  - Mashed potatoes - Raw vegetables   - Tomatoes   -  Applesauce  - Bananas  - Canned fruits  - Watermelon with out  seeds - Citrus fruits  - Moist fresh fruits   - Juices (not citrus)  - Luis aid  - Flat pop (no bubbles)  - Jell-O - Citrus juices  - Pop with bubbles         Postoperative Care for Tonsillectomy (with or without adenoidectomy)    Recovery - There are a handful of issues that routinely occur during recover that should be anticipated during your recovery.    1. The pain and swelling almost always gets worse before it gets better, this is normal.  Usually it peaks 3 to 5 days after the surgery, and then begins improving at 7 to 8 days after surgery.  Of course, this is variable from person to person.  2. The only dietary restriction is avoidance of hard or crunchy things until I see you in follow up.  If it makes a noise when you bite it, it is too hard.  Although it is good to begin eating again from day one, it is not unusual to not eat for several days after the procedure.  The most important thing is staying hydrated.  Drink fluids with electrolytes if possible, such as sports drinks.  3. The pain medication you were sent home with can make some people very nauseated.  To minimize this, avoid taking it on an empty stomach, or take smaller does with greater frequency.  For example if your dose is 2 teaspoons every four hours, try taking one teaspoon every two hours, etc.  4. Antibiotic are sometimes given after surgery, not to prevent infection, but some research shows that it helps to decrease pain.  This is not absolutely proven, and therefore is not absolutely necessary.   5. Try to stay ahead of the pain.  In other words, do not wait for pain medication to completely wear off before taking more pain medicine.  Instead, take the medication every 4 to 6 hours, even if it requires setting an alarm clock at night.  This is especially helpful during the first 5 days.  6. The uvula ( the small hanging object in the back of your mouth) frequently swells up  after tonsillectomy, but will go back to normal.  This swelling can temporarily cause the sensation of something being stuck in your throat, it will go away with recovery.  Also, because of the arrangement of nerves under where the tonsils were, sharp ear pain is very common during recovery, and will also go away with recovery.   7. With adenoidectomy, a very strong and foul-smelling odor can occur about 4-7 days after surgery.  This fades rapidly, and unless there is an associated fever no antibiotics are necessary.  8. It is very common after tonsillectomy to experience ear pain. This is due to nerves on the side of the throat becoming inflamed, and causing the perception of sudden episodes of ear pain.  This can be controlled with the same pain medication given for the surgery.     Activity - Avoid heavy lifting (greater than 20 pounds), strenuous exercise, or extremely cold environments until the follow up appointment.  Also, try to sleep with your head elevated.  An irritated cough from the breathing tube is fairly normal after surgery.    Medications - Except blood thinners, almost all medication can be re-started after tonsillectomy.      Complications - Bleeding is by far the most common complication after tonsillectomy.  If there are a few small drops or streaks of blood in the saliva that then goes away, this can be conservatively watched.  Gentle gargling with the ice water can also help stop this minor bleeding.  However, if the bleeding is persistent, or heavy bleeding occurs, do not hesitate.  Go to the emergency room to be evaluated.    Follow up - I like to see my patients about 2 weeks after the procedure to make sure that everything is healing appropriately.  Occasionally, there can be some longer - lasting side effects of surgery such as abnormal tongue sensations, or unusual swallowing.  However, if everything is healing well, the 2 week postoperative visit is all that will be necessary.    If  "there are any questions or issues with the above, or if there are other issues that concern you, always feel free to call the clinic and I am happy to speak with you as soon as I can.    Nathaniel Valentino MD   Otolaryngology  [facility]  [phone] After hours, [facility] option    Pending Results     No orders found from 6/10/2018 to 2018.            Admission Information     Date & Time Provider Department Dept. Phone    2018 Alex Valentino MD [facility] [phone]      Your Vitals Were     Blood Pressure Temperature Respirations Height Weight Pulse Oximetry    112/72 97.8  F (36.6  C) (Temporal) 16 1.689 m (5' 6.5\") 70.3 kg (155 lb) 100%    BMI (Body Mass Index)                   24.64 kg/m2           MyChart Information     [facility] lets you send messages to your doctor, view your test results, renew your prescriptions, schedule appointments and more. To sign up, go to [email or web address] . Click on \"Log in\" on the left side of the screen, which will take you to the Welcome page. Then click on \"Sign up Now\" on the right side of the page.     You will be asked to enter the access code listed below, as well as some personal information. Please follow the directions to create your username and password.     Your access code is: [email or web address]  Expires: 2018  6:55 PM     Your access code will  in 90 days. If you need help or a new code, please call your [facility] clinic or [phone].        Care EveryWhere ID     This is your Care EveryWhere ID. This could be used by other organizations to access your [facility] medical records  [id number]        Equal Access to Services     NESSA MEJIA : Neri Frank, evelyn magallanes, nicole gómez, brii ocampo. So [facility] [phone].    ATENCIÓN: Si habla español, tiene a dsouza disposición servicios gratuitos de asistencia lingüística. Llame al " [phone].    We comply with applicable federal civil rights laws and [place] laws. We do not discriminate on the basis of race, color, national origin, age, disability, sex, sexual orientation, or gender identity.               Review of your medicines      UNREVIEWED medicines. Ask your doctor about these medicines        Dose / Directions    levonorgestrel 20 MCG/24HR IUD   Commonly known as:  MIRENA        Dose:  1 each   1 each by Intrauterine route once   Refills:  0         START taking        Dose / Directions    ondansetron 4 MG ODT tab   Commonly known as:  ZOFRAN ODT   Used for:  Tonsillitis, chronic, Tonsil asymmetry        Dose:  4-8 mg   Take 1-2 tablets (4-8 mg) by mouth every 8 hours as needed for nausea   Quantity:  20 tablet   Refills:  1       * oxyCODONE IR 5 MG tablet   Commonly known as:  ROXICODONE   Used for:  Tonsil asymmetry, Tonsillitis, chronic, Acute post-operative pain        Dose:  5 mg   Take 1 tablet (5 mg) by mouth every 4 hours as needed for severe pain   Quantity:  50 tablet   Refills:  0       * oxyCODONE IR 5 MG tablet   Commonly known as:  ROXICODONE   Used for:  Tonsil asymmetry, Tonsillitis, chronic, Acute post-operative pain        Dose:  5 mg   Take 1 tablet (5 mg) by mouth every 4 hours as needed for severe pain   Quantity:  30 tablet   Refills:  0       * Notice:  This list has 2 medication(s) that are the same as other medications prescribed for you. Read the directions carefully, and ask your doctor or other care provider to review them with you.         Where to get your medicines      Some of these will need a paper prescription and others can be bought over the counter. Ask your nurse if you have questions.     Bring a paper prescription for each of these medications     ondansetron 4 MG ODT tab    oxyCODONE IR 5 MG tablet    oxyCODONE IR 5 MG tablet                Protect others around you: Learn how to safely use, store and throw away your medicines at  www.disposemymeds.org.        Information about OPIOIDS     PRESCRIPTION OPIOIDS: WHAT YOU NEED TO KNOW   We gave you an opioid (narcotic) pain medicine. It is important to manage your pain, but opioids are not always the best choice. You should first try all the other options your care team gave you. Take this medicine for as short a time (and as few doses) as possible.     These medicines have risks:    DO NOT drive when on new or higher doses of pain medicine. These medicines can affect your alertness and reaction times, and you could be arrested for driving under the influence (DUI). If you need to use opioids long-term, talk to your care team about driving.    DO NOT operate heave machinery    DO NOT do any other dangerous activities while taking these medicines.     DO NOT drink any alcohol while taking these medicines.      If the opioid prescribed includes acetaminophen, DO NOT take with any other medicines that contain acetaminophen. Read all labels carefully. Look for the word  acetaminophen  or  Tylenol.  Ask your pharmacist if you have questions or are unsure.    You can get addicted to pain medicines, especially if you have a history of addiction (chemical, alcohol or substance dependence). Talk to your care team about ways to reduce this risk.    Store your pills in a secure place, locked if possible. We will not replace any lost or stolen medicine. If you don t finish your medicine, please throw away (dispose) as directed by your pharmacist. The [place] Pollution Control Agency has more information about safe disposal: https://www.pca.[facility].[place].us/living-green/managing-unwanted-medications.     All opioids tend to cause constipation. Drink plenty of water and eat foods that have a lot of fiber, such as fruits, vegetables, prune juice, apple juice and high-fiber cereal. Take a laxative (Miralax, milk of magnesia, Colace, Senna) if you don t move your bowels at least every other day.               Medication List: This is a list of all your medications and when to take them. Check marks below indicate your daily home schedule. Keep this list as a reference.      Medications           Morning Afternoon Evening Bedtime As Needed    levonorgestrel 20 MCG/24HR IUD   Commonly known as:  MIRENA   1 each by Intrauterine route once                                ondansetron 4 MG ODT tab   Commonly known as:  ZOFRAN ODT   Take 1-2 tablets (4-8 mg) by mouth every 8 hours as needed for nausea                                * oxyCODONE IR 5 MG tablet   Commonly known as:  ROXICODONE   Take 1 tablet (5 mg) by mouth every 4 hours as needed for severe pain                                * oxyCODONE IR 5 MG tablet   Commonly known as:  ROXICODONE   Take 1 tablet (5 mg) by mouth every 4 hours as needed for severe pain                                * Notice:  This list has 2 medication(s) that are the same as other medications prescribed for you. Read the directions carefully, and ask your doctor or other care provider to review them with you.

## 2018-06-12 NOTE — DISCHARGE INSTRUCTIONS
[facility]  Same-Day Surgery   Adult Discharge Orders & Instructions   For 24 hours after surgery  1. Get plenty of rest.  A responsible adult must stay with you for at least 24 hours after you leave the hospital.   2. Do not drive or use heavy equipment.  If you have weakness or tingling, don't drive or use heavy equipment until this feeling goes away.  3. Do not drink alcohol.  4. Avoid strenuous or risky activities.  Ask for help when climbing stairs.   5. You may feel lightheaded.  IF so, sit for a few minutes before standing.  Have someone help you get up.   6. If you have nausea (feel sick to your stomach): Drink only clear liquids such as apple juice, ginger ale, broth or 7-Up.  Rest may also help.  Be sure to drink enough fluids.  Move to a regular diet as you feel able.  7. You may have a slight fever. Call the doctor if your fever is over 100 F (37.7 C) (taken under the tongue) or lasts longer than 24 hours.  8. You may have a dry mouth, a sore throat, muscle aches or trouble sleeping.  These should go away after 24 hours.  9. Do not make important or legal decisions.   Call your doctor for any of the followin.  Signs of infection (fever, growing tenderness at the surgery site, a large amount of drainage or bleeding, severe pain, foul-smelling drainage, redness, swelling).    2. It has been over 8 to 10 hours since surgery and you are still not able to urinate (pass water).    3.  Headache for over 24 hours.    4.  Numbness, tingling or weakness the day after surgery (if you had spinal anesthesia).  To contact a doctor, call _____[phone]______________________                May Eat Should not eat   - Soft bread  - Soggy waffles or   French toast (no crusts).  Soaked in syrup  - Pancakes  - Scrambled or   poached egg   - Toast  - Crispy waffles  - Fried foods   - Oatmeal,or   Creamy cereal  - Soggy cold cereal  (soaked in milk   - Crunchy cold   cereal   - Soup  - Hot  dogs  - Hamburgers  - Tender, moist  meat  - Pasta, noodles  - Spaghetti-Os  - Macaroni and  Cheese   - Tough, dry meat,  chicken or fish   - Milk  - Custard, pudding  - Ice cream  - Malts, shakes  - Yogurt (smooth)  - Cottage cheese   - Cookies  - Crackers  - Pretzels  - Chips  - Popcorn  - Nuts   - Sandwiches, (no crusts)  - Smooth peanut butter   and jelly  - Processed cheese  - Tuna - Grilled cheese  sandwiches   - Cooked vegetables  - Mashed potatoes - Raw vegetables   - Tomatoes   - Applesauce  - Bananas  - Canned fruits  - Watermelon with out  seeds - Citrus fruits  - Moist fresh fruits   - Juices (not citrus)  - Luis aid  - Flat pop (no bubbles)  - Jell-O - Citrus juices  - Pop with bubbles         Postoperative Care for Tonsillectomy (with or without adenoidectomy)    Recovery - There are a handful of issues that routinely occur during recover that should be anticipated during your recovery.    1. The pain and swelling almost always gets worse before it gets better, this is normal.  Usually it peaks 3 to 5 days after the surgery, and then begins improving at 7 to 8 days after surgery.  Of course, this is variable from person to person.  2. The only dietary restriction is avoidance of hard or crunchy things until I see you in follow up.  If it makes a noise when you bite it, it is too hard.  Although it is good to begin eating again from day one, it is not unusual to not eat for several days after the procedure.  The most important thing is staying hydrated.  Drink fluids with electrolytes if possible, such as sports drinks.  3. The pain medication you were sent home with can make some people very nauseated.  To minimize this, avoid taking it on an empty stomach, or take smaller does with greater frequency.  For example if your dose is 2 teaspoons every four hours, try taking one teaspoon every two hours, etc.  4. Antibiotic are sometimes given after surgery, not to prevent infection, but some research shows  that it helps to decrease pain.  This is not absolutely proven, and therefore is not absolutely necessary.   5. Try to stay ahead of the pain.  In other words, do not wait for pain medication to completely wear off before taking more pain medicine.  Instead, take the medication every 4 to 6 hours, even if it requires setting an alarm clock at night.  This is especially helpful during the first 5 days.  6. The uvula ( the small hanging object in the back of your mouth) frequently swells up after tonsillectomy, but will go back to normal.  This swelling can temporarily cause the sensation of something being stuck in your throat, it will go away with recovery.  Also, because of the arrangement of nerves under where the tonsils were, sharp ear pain is very common during recovery, and will also go away with recovery.   7. With adenoidectomy, a very strong and foul-smelling odor can occur about 4-7 days after surgery.  This fades rapidly, and unless there is an associated fever no antibiotics are necessary.  8. It is very common after tonsillectomy to experience ear pain. This is due to nerves on the side of the throat becoming inflamed, and causing the perception of sudden episodes of ear pain.  This can be controlled with the same pain medication given for the surgery.     Activity - Avoid heavy lifting (greater than 20 pounds), strenuous exercise, or extremely cold environments until the follow up appointment.  Also, try to sleep with your head elevated.  An irritated cough from the breathing tube is fairly normal after surgery.    Medications - Except blood thinners, almost all medication can be re-started after tonsillectomy.      Complications - Bleeding is by far the most common complication after tonsillectomy.  If there are a few small drops or streaks of blood in the saliva that then goes away, this can be conservatively watched.  Gentle gargling with the ice water can also help stop this minor bleeding.   However, if the bleeding is persistent, or heavy bleeding occurs, do not hesitate.  Go to the emergency room to be evaluated.    Follow up - I like to see my patients about 2 weeks after the procedure to make sure that everything is healing appropriately.  Occasionally, there can be some longer - lasting side effects of surgery such as abnormal tongue sensations, or unusual swallowing.  However, if everything is healing well, the 2 week postoperative visit is all that will be necessary.    If there are any questions or issues with the above, or if there are other issues that concern you, always feel free to call the clinic and I am happy to speak with you as soon as I can.    Nathaniel Valentino MD   Otolaryngology  [facility]  [phone] After hours, [facility] option

## 2018-06-12 NOTE — ANESTHESIA POSTPROCEDURE EVALUATION
Patient: Meredith Mello    Procedure(s):  Bilateral tonsillectomy - Wound Class: II-Clean Contaminated    Diagnosis:Right tonsil mass  Diagnosis Additional Information: No value filed.    Anesthesia Type:  General, ETT    Note:  Anesthesia Post Evaluation    Patient location during evaluation: PACU  Patient participation: Able to fully participate in evaluation  Level of consciousness: awake  Pain management: adequate  Airway patency: patent  Cardiovascular status: acceptable  Respiratory status: acceptable  Hydration status: acceptable  PONV: none     Anesthetic complications: None    Comments: Stable recovery noted.        Last vitals:  Vitals:    06/12/18 1335 06/12/18 1345 06/12/18 1400   BP: 112/72 118/80 113/76   Resp: 16 28 16   Temp:      SpO2: 100% 99% 100%         Electronically Signed By: Carlos Covarrubias MD  June 12, 2018  2:11 PM

## 2018-06-12 NOTE — ANESTHESIA PREPROCEDURE EVALUATION
Anesthesia Evaluation     . Pt has not had prior anesthetic            ROS/MED HX    ENT/Pulmonary:  - neg pulmonary ROS     Neurologic:  - neg neurologic ROS     Cardiovascular:  - neg cardiovascular ROS       METS/Exercise Tolerance:     Hematologic:  - neg hematologic  ROS       Musculoskeletal:  - neg musculoskeletal ROS       GI/Hepatic:  - neg GI/hepatic ROS       Renal/Genitourinary:  - ROS Renal section negative       Endo:  - neg endo ROS       Psychiatric:     (+) psychiatric history anxiety      Infectious Disease:  - neg infectious disease ROS       Malignancy:      - no malignancy   Other:    - neg other ROS                 Physical Exam  Normal systems: cardiovascular, pulmonary and dental    Airway   Mallampati: I  TM distance: >3 FB  Neck ROM: full    Dental     Cardiovascular       Pulmonary    breath sounds clear to auscultation                    Anesthesia Plan      History & Physical Review  History and physical reviewed and following examination; no interval change.    ASA Status:  1 .    NPO Status:  > 8 hours    Plan for General and ETT with Intravenous and Propofol induction. Maintenance will be TIVA.    PONV prophylaxis:  Ondansetron (or other 5HT-3) and Dexamethasone or Solumedrol       Postoperative Care      Consents                          .

## 2018-06-12 NOTE — OP NOTE
PREOPERATIVE DIAGNOSES:   1. Chronic tonsillitis.   2. Tonsillar asymmetry  POSTOPERATIVE DIAGNOSES:   1. Chronic tonsillitis.   2. Tonsillar asymmetry  PROCEDURE PERFORMED: Bilateral tonsillectomy   SURGEON: Alex Valentino MD   ASSISTANT: None  BLOOD LOSS: 5 mL.   COMPLICATIONS: None.   SPECIMENS: Bilateral tonsils for permanent section  ANESTHESIA: GETA.   INDICATIONS: Meredith Mello presented to me with significant tonsillar asymmetry and possible tonsillar mass. Due to concern for malignancy, therefore my recommendation was for surgery. Preoperatively, the risks discussed included the risks of infection, bleeding, the risks of general anesthesia. Also, the possibility of need for emergent return to the operating room was discussed. They understood and wished to proceed.   OPERATIVE PROCEDURE: After being taken to the operating room and induction of general endotracheal tube anesthesia, the bed was rotated 90 degrees and a shoulder roll and head turban were placed. I suspended the patient from the Durant stand using a Jason-Garry mouthgag, and I grasped the right tonsil with an Allis forceps and retracted medially and performed subcapsular dissection utilizing monopolar cautery, and the right tonsil came out very smoothly. I then turned my attention to the left side, once again using an Allis forceps to grasp it and retract it medially, and then I performed subcapsular dissection, and the left tonsil also came out very smoothly. I released the mouthgag for 2 minutes to allow recirculation of blood to the tongue.   I resuspended the patient from the Durant stand using a Jason-Garry mouthgag, and there was good hemostasis. I applied a thin film of the hemostatic powder to the tonsil beds bilaterally and removed the mouthgag. The bed was rotated 90 degrees after I removed the shoulder roll and head turban, and the patient was awakened, extubated and sent to the recovery room in good condition.

## 2018-06-12 NOTE — IP AVS SNAPSHOT
[facility]    [address] [place] [address]    Phone:  [phone]                                       After Visit Summary   6/12/2018    Meredith Mello    MRN: [id number]           After Visit Summary Signature Page     I have received my discharge instructions, and my questions have been answered. I have discussed any challenges I see with this plan with the nurse or doctor.    ..........................................................................................................................................  Patient/Patient Representative Signature      ..........................................................................................................................................  Patient Representative Print Name and Relationship to Patient    ..................................................               ................................................  Date                                            Time    ..........................................................................................................................................  Reviewed by Signature/Title    ...................................................              ..............................................  Date                                                            Time

## 2018-06-18 LAB — COPATH REPORT: NORMAL

## 2018-06-19 ENCOUNTER — TELEPHONE (OUTPATIENT)
Dept: OTOLARYNGOLOGY | Facility: CLINIC | Age: 28
End: 2018-06-19

## 2018-06-19 NOTE — TELEPHONE ENCOUNTER
RN called and spoke to patient. Patient is post op day 8 from a tonsillectomy with Dr. Valentino. Patient suprisingly has done very well with recovery. States that pain for the first 5 days was minimal and healing is going well. Denies any troubles tolerating PO and severe swelling. Patient reports that on day 6 she became very nauseated 30 minutes after taking the prescribed Oxycodone. And lasted throughout the day when she would take medication. Patient was also taking the prescribed Zofran with the oxycodone. Reports that she did have emesis x2 last evening but nothing since then. Denies being around anyone with flu like symptoms. Continues to take OTC Ibuprofen every 6 hours as needed. RN advised patient to stop taking the Oxycodone for now until provider has chance to review. And because pain  Is being controlled well with Ibuprofen. Encouraged patient to have something with substance in her stomach before taking medication. Spoke to patient about side effects of Ibuprofen on an empty stomach. Patient reports that when taking other narcotics like Norco she also remembers this same thing happening to her. Patient states that she is healing well. No other complications at this time.  Denies fever, drainage, discharge, excessive swelling, uncontrollable pain.Has a follow up appointment on 6/25 with Dr. Valentino. RN informed patient that this message will be sent to provider for further review.     FRANK Buitrago

## 2018-06-19 NOTE — TELEPHONE ENCOUNTER
RN called and spoke with patient again. Informed patient that Dr. Valentino agrees with plan and to call clinic with any other changes.    FRANK Buitrago

## 2018-06-19 NOTE — TELEPHONE ENCOUNTER
Reason for call:  Patient reporting a symptom    Symptom or request: Patient had her tonsils out and the pain medication prescribed does not seem to be helping. Is there another medication she can take? Please advise    Duration (how long have symptoms been present): since surgery    Have you been treated for this before? Yes    Additional comments: Please advise    Phone Number patient can be reached at:  Home number on file [phone] (home) patients grandmother Laureen Jose is with her and made the call    Best Time:  ASAP     Can we leave a detailed message on this number:  YES    Call taken on 6/19/2018 at 1:43 PM by Mandy Edmond

## 2018-06-25 ENCOUNTER — OFFICE VISIT (OUTPATIENT)
Dept: OTOLARYNGOLOGY | Facility: CLINIC | Age: 28
End: 2018-06-25
Payer: COMMERCIAL

## 2018-06-25 VITALS
HEIGHT: 67 IN | WEIGHT: 155 LBS | DIASTOLIC BLOOD PRESSURE: 70 MMHG | OXYGEN SATURATION: 100 % | RESPIRATION RATE: 12 BRPM | HEART RATE: 64 BPM | BODY MASS INDEX: 24.33 KG/M2 | SYSTOLIC BLOOD PRESSURE: 121 MMHG

## 2018-06-25 DIAGNOSIS — J35.8 TONSIL ASYMMETRY: Primary | ICD-10-CM

## 2018-06-25 PROCEDURE — 99024 POSTOP FOLLOW-UP VISIT: CPT | Performed by: OTOLARYNGOLOGY

## 2018-06-25 NOTE — PATIENT INSTRUCTIONS
Scheduling Information  To schedule your CT/MRI scan, please contact [facility] at [phone] OR [facility] at [phone]    To schedule your Surgery, please contact our Specialty Schedulers at [phone]      ENT Clinic Locations Clinic Hours Telephone Number     [facility]  [address] [place] [address]   Monday:           1:00pm -- 5:00pm    Friday:              8:00am - 12:00pm   To schedule/reschedule an appointment with   Dr. Valentino,   please contact our   Specialty Scheduling Department at:     [phone]       [facility]  [address] [place] [address] Tuesday:          8:00am -- 2:00pm         Urgent Care Locations Clinic Hours Telephone Numbers     [facility]  [address] [place] [address]     Monday-Friday:     11:00am - 9:00pm    Saturday-Sunday:  9:00am - 5:00pm   [phone]     [facility]  [address] [place] [address]     Monday-Friday:      5:00pm - 9:00pm     Saturday-Sunday:  9:00am - 5:00pm   [phone]

## 2018-06-25 NOTE — MR AVS SNAPSHOT
After Visit Summary   6/25/2018    Meredith Mello    MRN: [id number]           Patient Information     Date Of Birth          1990        Visit Information        Provider Department      6/25/2018 5:00 PM Alex Valentino MD [facility]        Today's Diagnoses     Tonsil asymmetry    -  1      Care Instructions    Scheduling Information  To schedule your CT/MRI scan, please contact [facility] at [phone] OR [facility] at [phone]    To schedule your Surgery, please contact our Specialty Schedulers at [phone]      ENT Clinic Locations Clinic Hours Telephone Number     [facility]  [address]  [place] [address]   Monday:           1:00pm -- 5:00pm    Friday:              8:00am - 12:00pm   To schedule/reschedule an appointment with   Dr. Valentino,   please contact our   Specialty Scheduling Department at:     [phone]       [facility]  [address]  [place] [address] Tuesday:          8:00am -- 2:00pm         Urgent Care Locations Clinic Hours Telephone Numbers     [facility]  [address]  [place] [address]     Monday-Friday:     11:00am - 9:00pm    Saturday-Sunday:  9:00am - 5:00pm   [phone]     [facility]  [address] [place] [address]     Monday-Friday:      5:00pm - 9:00pm     Saturday-Sunday:  9:00am - 5:00pm   [phone]                 Follow-ups after your visit        Who to contact     If you have questions or need follow up information about today's clinic visit or your schedule please contact [facility] directly at [phone].  Normal or non-critical lab and imaging results will be communicated to you by MyChart, letter or phone within 4 business days after the clinic has received the results. If you do not hear from us within 7 days, please contact the clinic through MyChart or phone. If you have a critical or abnormal lab result, we will  "notify you by phone as soon as possible.  Submit refill requests through [employer] or call your pharmacy and they will forward the refill request to us. Please allow 3 business days for your refill to be completed.          Additional Information About Your Visit        Havkrafthart Information     [employer] lets you send messages to your doctor, view your test results, renew your prescriptions, schedule appointments and more. To sign up, go to [email or web address] . Click on \"Log in\" on the left side of the screen, which will take you to the Welcome page. Then click on \"Sign up Now\" on the right side of the page.     You will be asked to enter the access code listed below, as well as some personal information. Please follow the directions to create your username and password.     Your access code is: [email or web address]  Expires: 2018  6:55 PM     Your access code will  in 90 days. If you need help or a new code, please call your [facility] clinic or [phone].        Care EveryWhere ID     This is your Care EveryWhere ID. This could be used by other organizations to access your [facility] medical records  [id number]        Your Vitals Were     Pulse Respirations Height Pulse Oximetry BMI (Body Mass Index)       64 12 1.689 m (5' 6.5\") 100% 24.64 kg/m2        Blood Pressure from Last 3 Encounters:   18 121/70   18 113/76   18 124/70    Weight from Last 3 Encounters:   18 70.3 kg (155 lb)   18 70.3 kg (155 lb)   18 70.3 kg (155 lb)              Today, you had the following     No orders found for display       Primary Care Provider Office Phone # Fax #    Rossana Ortiz [phone] [address] [place] [address]        Equal Access to Services     [facility] : Neri Frank, wafideda luqadaha, qaybta kaalbrii ruiz . [facility] [phone].    ATENCIÓN: Si bing morales " disposición servicios gratuitos de asistencia lingüística. Katlyn ponce [phone].    We comply with applicable federal civil rights laws and [place] laws. We do not discriminate on the basis of race, color, national origin, age, disability, sex, sexual orientation, or gender identity.            Thank you!     Thank you for choosing [facility]  for your care. Our goal is always to provide you with excellent care. Hearing back from our patients is one way we can continue to improve our services. Please take a few minutes to complete the written survey that you may receive in the mail after your visit with us. Thank you!             Your Updated Medication List - Protect others around you: Learn how to safely use, store and throw away your medicines at www.disposemymeds.org.          This list is accurate as of 6/25/18  5:35 PM.  Always use your most recent med list.                   Brand Name Dispense Instructions for use Diagnosis    levonorgestrel 20 MCG/24HR IUD    MIRENA     1 each by Intrauterine route once        ondansetron 4 MG ODT tab    ZOFRAN ODT    20 tablet    Take 1-2 tablets (4-8 mg) by mouth every 8 hours as needed for nausea    Tonsillitis, chronic, Tonsil asymmetry       * oxyCODONE IR 5 MG tablet    ROXICODONE    50 tablet    Take 1 tablet (5 mg) by mouth every 4 hours as needed for severe pain    Tonsil asymmetry, Tonsillitis, chronic, Acute post-operative pain       * oxyCODONE IR 5 MG tablet    ROXICODONE    30 tablet    Take 1 tablet (5 mg) by mouth every 4 hours as needed for severe pain    Tonsil asymmetry, Tonsillitis, chronic, Acute post-operative pain       * Notice:  This list has 2 medication(s) that are the same as other medications prescribed for you. Read the directions carefully, and ask your doctor or other care provider to review them with you.

## 2018-06-25 NOTE — PROGRESS NOTES
"History of Present Illness - Meredith Mello is a 27 year old female who is status post tonsillectomy on 6/12/18.  There was the expected amount of discomfort in the postoperative period, but at this point the patient is back to a regular diet, and not needing pain medication.  There was no bleeding, and no fevers or chills.    /70  Pulse 64  Resp 12  Ht 1.689 m (5' 6.5\")  Wt 70.3 kg (155 lb)  SpO2 100%  BMI 24.64 kg/m2    General - The patient is well nourished and well developed, and appears to have good nutritional status.  Alert and oriented to person and place, answers questions and cooperates with examination appropriately.   Head and Face - Normocephalic and atraumatic, with no gross asymmetry noted of the contour of the facial features.  The facial nerve is intact, with strong symmetric movements.  Eyes - Extraocular movements intact, and the pupils were reactive to light.  Sclera were not icteric or injected, conjunctiva were pink and moist.  Neck - Normal midline excursion of the laryngotracheal complex during swallowing.  Full range of motion on passive movement.  Palpation of the occipital, submental, submandibular, internal jugular chain, and supraclavicular nodes did not demonstrate any abnormal lymph nodes or masses.  The carotid pulse was palpable bilaterally.  Palpation of the thyroid was soft and smooth, with no nodules or goiter appreciated.  The trachea was mobile and midline.  Mouth - Examination of the oral cavity shows pink, healthy, moist mucosa.  No lesions or ulceration noted.  The dentition are in good repair.  The tongue is mobile and midline.  Oropharynx - The tonsil beds are remucosalizing appropriately.  No signs of bleeding or clots.  The Uvula is midline and the soft palate is symmetric.     A/P - Meredith Mello has had an uncomplicated tonsillectomy.  They have no restrictions at this point and can return on an as needed basis.      "

## 2018-10-01 ENCOUNTER — TRANSFERRED RECORDS (OUTPATIENT)
Dept: HEALTH INFORMATION MANAGEMENT | Facility: CLINIC | Age: 28
End: 2018-10-01

## 2018-10-01 LAB — PAP SMEAR - HIM PATIENT REPORTED: NORMAL

## 2018-12-24 DIAGNOSIS — F41.1 GAD (GENERALIZED ANXIETY DISORDER): Primary | ICD-10-CM

## 2018-12-24 NOTE — TELEPHONE ENCOUNTER
Please verify if patient is still taking this.  Okay for 30 days refill if she is taking, but she will need to follow-up for a med check or physical to get additional refills, as I haven't seen her in a year.    Bianca Webb, CNP

## 2018-12-24 NOTE — TELEPHONE ENCOUNTER
escitalopram (LEXAPRO) 10 MG tablet  Last Written Prescription Date:  12/26/2018  Last Fill Quantity: 90,   # refills: 3  Last Office Visit: 5/29/2018  Future Office visit:       Routing refill request to provider for review/approval because:  Drug not active on patient's medication list

## 2018-12-26 RX ORDER — ESCITALOPRAM OXALATE 10 MG/1
TABLET ORAL
Qty: 30 TABLET | Refills: 0 | Status: SHIPPED | OUTPATIENT
Start: 2018-12-26 | End: 2019-01-14

## 2018-12-26 NOTE — TELEPHONE ENCOUNTER
Called patient and she stated that she occasionally takes herself off the med to see how she does without med  However, she put herself back on the Lexapro 10mg daily and has been back on it for while now  Refill sent  Patient stated that she will call the clinic to schedule a physical on her own another time    Kevan Chandler RN

## 2019-01-14 ENCOUNTER — OFFICE VISIT (OUTPATIENT)
Dept: FAMILY MEDICINE | Facility: CLINIC | Age: 29
End: 2019-01-14
Payer: COMMERCIAL

## 2019-01-14 VITALS
HEIGHT: 67 IN | RESPIRATION RATE: 18 BRPM | WEIGHT: 160 LBS | SYSTOLIC BLOOD PRESSURE: 106 MMHG | DIASTOLIC BLOOD PRESSURE: 61 MMHG | BODY MASS INDEX: 25.11 KG/M2 | OXYGEN SATURATION: 97 % | TEMPERATURE: 97.6 F | HEART RATE: 63 BPM

## 2019-01-14 DIAGNOSIS — Z00.00 WELL ADULT EXAM: Primary | ICD-10-CM

## 2019-01-14 DIAGNOSIS — F41.1 GAD (GENERALIZED ANXIETY DISORDER): ICD-10-CM

## 2019-01-14 PROCEDURE — 99385 PREV VISIT NEW AGE 18-39: CPT | Performed by: FAMILY MEDICINE

## 2019-01-14 RX ORDER — ESCITALOPRAM OXALATE 10 MG/1
10 TABLET ORAL DAILY
Qty: 30 TABLET | Refills: 11 | Status: SHIPPED | OUTPATIENT
Start: 2019-01-14 | End: 2020-02-02

## 2019-01-14 ASSESSMENT — ENCOUNTER SYMPTOMS
HEMATOCHEZIA: 0
FEVER: 0
MYALGIAS: 0
JOINT SWELLING: 0
PALPITATIONS: 0
CONSTIPATION: 0
DIZZINESS: 0
NAUSEA: 0
BREAST MASS: 0
DYSURIA: 0
HEARTBURN: 0
CHILLS: 0
HEADACHES: 1
HEMATURIA: 0
ABDOMINAL PAIN: 0
FREQUENCY: 0
DIARRHEA: 0
ARTHRALGIAS: 0
NERVOUS/ANXIOUS: 0
PARESTHESIAS: 0
COUGH: 0
EYE PAIN: 0
WEAKNESS: 0
SHORTNESS OF BREATH: 0
SORE THROAT: 0

## 2019-01-14 ASSESSMENT — MIFFLIN-ST. JEOR: SCORE: 1485.45

## 2019-01-14 NOTE — PROGRESS NOTES
SUBJECTIVE:   CC: Meredith Mello is an 28 year old woman who presents for preventive health visit.     Physical   Annual:     Getting at least 3 servings of Calcium per day:  Yes    Bi-annual eye exam:  Yes    Dental care twice a year:  Yes    Sleep apnea or symptoms of sleep apnea:  Daytime drowsiness    Diet:  Regular (no restrictions)    Frequency of exercise:  None    Taking medications regularly:  Yes    Medication side effects:  None    Additional concerns today:  No    PHQ-2 Total Score: 0      History of anxiety and depression: has been on lexapro for 3 years.  Has tried discontinuing, but then mood worsens.  Mood has been stable, doing well.  Needs a refill.    CV: no family h/o early CAD  Malignancy: no breast or colon cancer in the family. Reports paps have been normal.  [facility]    Bone health: no risk factors  Immunizations: tdap done 2017  Sexual health: no new partners; [facility]; has a little spotting, but not regular periods.    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      Today's PHQ-2 Score:   PHQ-2 ( 1999 Pf[employer]) 3/12/2018   Q1: Little interest or pleasure in doing things 0   Q2: Feeling down, depressed or hopeless 0   PHQ-2 Score 0   Q1: Little interest or pleasure in doing things -   Q2: Feeling down, depressed or hopeless -   PHQ-2 Score -     Abuse: Current or Past(Physical, Sexual or Emotional)- No  Do you feel safe in your environment? Yes    Social History     Tobacco Use     Smoking status: Never Smoker     Smokeless tobacco: Never Used   Substance Use Topics     Alcohol use: Yes     Comment: occasional     No flowsheet data found.No flowsheet data found.    Reviewed orders with patient.  Reviewed health maintenance and updated orders accordingly - Yes  Patient Active Problem List   Diagnosis     CARDIOVASCULAR SCREENING; LDL GOAL LESS THAN 160     SHERRI (generalized anxiety disorder)     Tonsil  asymmetry     Past Surgical History:   Procedure Laterality Date     NO HISTORY OF SURGERY       TONSILLECTOMY Bilateral 6/12/2018    Procedure: TONSILLECTOMY;  Bilateral tonsillectomy;  Surgeon: Alex Valentino MD;  Location:  OR       Social History     Tobacco Use     Smoking status: Never Smoker     Smokeless tobacco: Never Used   Substance Use Topics     Alcohol use: Yes     Comment: occasional     Family History   Problem Relation Age of Onset     Hypertension Father      Hyperlipidemia Maternal Grandmother      Coronary Artery Disease No family hx of      Diabetes No family hx of          Current Outpatient Medications   Medication Sig Dispense Refill     levonorgestrel (MIRENA) 20 MCG/24HR IUD 1 each by Intrauterine route once       escitalopram (LEXAPRO) 10 MG tablet Take 1 tablet (10 mg) by mouth daily 30 tablet 11     Allergies   Allergen Reactions     Gluten Meal Diarrhea and Rash       Mammogram not appropriate for this patient based on age.    Pertinent mammograms are reviewed under the imaging tab.  History of abnormal Pap smear:      Reviewed and updated as needed this visit by clinical staff  Tobacco  Allergies  Meds  Med Hx  Surg Hx  Fam Hx  Soc Hx        Reviewed and updated as needed this visit by Provider            Review of Systems   Constitutional: Negative for chills and fever.   HENT: Positive for congestion. Negative for ear pain, hearing loss and sore throat.    Eyes: Negative for pain and visual disturbance.   Respiratory: Negative for cough and shortness of breath.    Cardiovascular: Negative for chest pain, palpitations and peripheral edema.   Gastrointestinal: Negative for abdominal pain, constipation, diarrhea, heartburn, hematochezia and nausea.   Breasts:  Negative for tenderness, breast mass and discharge.   Genitourinary: Negative for dysuria, frequency, genital sores, hematuria, pelvic pain, urgency, vaginal bleeding and vaginal discharge.   Musculoskeletal:  "Negative for arthralgias, joint swelling and myalgias.   Skin: Positive for rash.   Neurological: Positive for headaches. Negative for dizziness, weakness and paresthesias.   Psychiatric/Behavioral: Negative for mood changes. The patient is not nervous/anxious.           OBJECTIVE:   There were no vitals taken for this visit.  Physical Exam  GENERAL: healthy, alert and no distress  EYES: Eyes grossly normal to inspection, PERRL and conjunctivae and sclerae normal  HENT: ear canals and TM's normal, nose and mouth without ulcers or lesions  NECK: no adenopathy, no asymmetry, masses, or scars and thyroid normal to palpation  RESP: lungs clear to auscultation - no rales, rhonchi or wheezes  BREAST: normal without masses, tenderness or nipple discharge and no palpable axillary masses or adenopathy  CV: regular rate and rhythm, normal S1 S2, no S3 or S4, no murmur, click or rub, no peripheral edema and peripheral pulses strong  ABDOMEN: soft, nontender, no hepatosplenomegaly, no masses and bowel sounds normal  MS: no gross musculoskeletal defects noted, no edema  SKIN: no suspicious lesions or rashes  NEURO: Normal strength and tone, mentation intact and speech normal  PSYCH: mentation appears normal, affect normal/bright    Diagnostic Test Results:  none     ASSESSMENT/PLAN:   1. Well adult exam  CV: no risk factors  Malignancy: pap up to date per patient  Bone health: no risk factors  Immunizations: up to date  Sexual health; no concerns, will f/u with PP for mirena when due (has had for 5 yrs, was advised by PP she can keep it for 7)    2. SHERRI (generalized anxiety disorder)  Stable.  Medication refilled.    - escitalopram (LEXAPRO) 10 MG tablet; Take 1 tablet (10 mg) by mouth daily  Dispense: 30 tablet; Refill: 11    COUNSELING:      BP Readings from Last 1 Encounters:   06/25/18 121/70     Estimated body mass index is 24.64 kg/m  as calculated from the following:    Height as of 6/25/18: 1.689 m (5' 6.5\").    Weight " as of 6/25/18: 70.3 kg (155 lb).           reports that  has never smoked. she has never used smokeless tobacco.      Counseling Resources:  ATP IV Guidelines  Pooled Cohorts Equation Calculator  Breast Cancer Risk Calculator  FRAX Risk Assessment  ICSI Preventive Guidelines  Dietary Guidelines for Americans, 2010  USDA's MyPlate  ASA Prophylaxis  Lung CA Screening    Kaitlin Rodriges MD  [facility]

## 2020-01-31 DIAGNOSIS — F41.1 GAD (GENERALIZED ANXIETY DISORDER): ICD-10-CM

## 2020-01-31 NOTE — TELEPHONE ENCOUNTER
"Requested Prescriptions   Pending Prescriptions Disp Refills     escitalopram (LEXAPRO) 10 MG tablet [Pharmacy Med Name: ESCITALOPRAM 10 MG TABLET]  Last Written Prescription Date:  1-14-19  Last Fill Quantity: 30 tab,  # refills: 11   Last office visit: 1/14/2019 with prescribing provider:  Kaitlin Rodriges   Future Office Visit:   30 tablet 11     Sig: TAKE 1 TABLET BY MOUTH EVERY DAY       SSRIs Protocol Failed - 1/31/2020  2:02 AM  No flowsheet data found.  No flowsheet data found.          Failed - Recent (12 mo) or future (30 days) visit within the authorizing provider's specialty     Patient has had an office visit with the authorizing provider or a provider within the authorizing providers department within the previous 12 mos or has a future within next 30 days. See \"Patient Info\" tab in inbasket, or \"Choose Columns\" in Meds & Orders section of the refill encounter.            Passed - Medication is active on med list        Passed - Patient is age 18 or older        Passed - No active pregnancy on record        Passed - No positive pregnancy test in last 12 months         "

## 2020-01-31 NOTE — LETTER
[facility]  [address] [place] [address]  Phone: [phone]    02/03/20    Meredith Funmilayo  [address]  [place] [address]      To whom it may concern:     In order to ensure we are providing the best quality care, we have reviewed your chart and see that you are due for an annual office visit.    We have also sent your escitalopram (LEXAPRO) 10 MG tablet medication to your pharmacy. For future refills on this medication, please contact the clinic to schedule the above appointment. This can be requested via [employer] or by calling the clinic at [phone].    We greatly appreciate the opportunity to serve you. Thank you for trusting us with your health care.      Sincerely,    Your care team at [facility]

## 2020-02-02 RX ORDER — ESCITALOPRAM OXALATE 10 MG/1
TABLET ORAL
Qty: 30 TABLET | Refills: 0 | Status: SHIPPED | OUTPATIENT
Start: 2020-02-02 | End: 2020-02-27

## 2020-02-03 NOTE — TELEPHONE ENCOUNTER
HP Reception Medication is being filled for 1 time refill only due to:  Patient needs to be seen because it has been more than one year since last visit.     Signed Prescriptions:                        Disp   Refills    escitalopram (LEXAPRO) 10 MG tablet        30 tab*0        Sig: TAKE 1 TABLET BY MOUTH EVERY DAY  Authorizing Provider: LAKHWINDER PALMER  Ordering User: CARMEL BARKER

## 2020-02-03 NOTE — TELEPHONE ENCOUNTER
LM informing patient that RX was refilled. Patient is due for an annual office visit prior to additional refills given. Sending letter.    Katie BLACKWELL     [facility]

## 2020-02-27 ENCOUNTER — OFFICE VISIT (OUTPATIENT)
Dept: FAMILY MEDICINE | Facility: CLINIC | Age: 30
End: 2020-02-27
Payer: COMMERCIAL

## 2020-02-27 VITALS
BODY MASS INDEX: 27.47 KG/M2 | HEART RATE: 60 BPM | RESPIRATION RATE: 16 BRPM | DIASTOLIC BLOOD PRESSURE: 68 MMHG | TEMPERATURE: 98.8 F | SYSTOLIC BLOOD PRESSURE: 102 MMHG | WEIGHT: 175 LBS | HEIGHT: 67 IN

## 2020-02-27 DIAGNOSIS — F41.1 GAD (GENERALIZED ANXIETY DISORDER): ICD-10-CM

## 2020-02-27 DIAGNOSIS — F43.21 GRIEF: ICD-10-CM

## 2020-02-27 DIAGNOSIS — Z00.00 ROUTINE GENERAL MEDICAL EXAMINATION AT A HEALTH CARE FACILITY: Primary | ICD-10-CM

## 2020-02-27 LAB
CHOLEST SERPL-MCNC: 187 MG/DL
HDLC SERPL-MCNC: 71 MG/DL
LDLC SERPL CALC-MCNC: 104 MG/DL
NONHDLC SERPL-MCNC: 116 MG/DL
TRIGL SERPL-MCNC: 62 MG/DL

## 2020-02-27 PROCEDURE — 96127 BRIEF EMOTIONAL/BEHAV ASSMT: CPT | Performed by: NURSE PRACTITIONER

## 2020-02-27 PROCEDURE — 80061 LIPID PANEL: CPT | Performed by: NURSE PRACTITIONER

## 2020-02-27 PROCEDURE — 99213 OFFICE O/P EST LOW 20 MIN: CPT | Mod: 25 | Performed by: NURSE PRACTITIONER

## 2020-02-27 PROCEDURE — 99395 PREV VISIT EST AGE 18-39: CPT | Performed by: NURSE PRACTITIONER

## 2020-02-27 PROCEDURE — 36415 COLL VENOUS BLD VENIPUNCTURE: CPT | Performed by: NURSE PRACTITIONER

## 2020-02-27 RX ORDER — ESCITALOPRAM OXALATE 10 MG/1
10 TABLET ORAL DAILY
Qty: 90 TABLET | Refills: 3 | Status: SHIPPED | OUTPATIENT
Start: 2020-02-27 | End: 2021-03-11

## 2020-02-27 ASSESSMENT — ANXIETY QUESTIONNAIRES
GAD7 TOTAL SCORE: 1
5. BEING SO RESTLESS THAT IT IS HARD TO SIT STILL: NOT AT ALL
GAD7 TOTAL SCORE: 1
3. WORRYING TOO MUCH ABOUT DIFFERENT THINGS: NOT AT ALL
7. FEELING AFRAID AS IF SOMETHING AWFUL MIGHT HAPPEN: NOT AT ALL
4. TROUBLE RELAXING: NOT AT ALL
2. NOT BEING ABLE TO STOP OR CONTROL WORRYING: NOT AT ALL
1. FEELING NERVOUS, ANXIOUS, OR ON EDGE: SEVERAL DAYS
6. BECOMING EASILY ANNOYED OR IRRITABLE: NOT AT ALL
7. FEELING AFRAID AS IF SOMETHING AWFUL MIGHT HAPPEN: NOT AT ALL
GAD7 TOTAL SCORE: 1

## 2020-02-27 ASSESSMENT — ENCOUNTER SYMPTOMS
NAUSEA: 0
FEVER: 0
SHORTNESS OF BREATH: 0
WEAKNESS: 0
CONSTIPATION: 0
DIARRHEA: 0
HEMATOCHEZIA: 0
HEADACHES: 1
CHILLS: 0
COUGH: 0
PARESTHESIAS: 0
JOINT SWELLING: 0
HEARTBURN: 0
ABDOMINAL PAIN: 0
SORE THROAT: 0
MYALGIAS: 0
PALPITATIONS: 0
DIZZINESS: 0
DYSURIA: 0
FREQUENCY: 0
BREAST MASS: 0
NERVOUS/ANXIOUS: 1
EYE PAIN: 0
ARTHRALGIAS: 0
HEMATURIA: 0

## 2020-02-27 ASSESSMENT — MIFFLIN-ST. JEOR: SCORE: 1548.48

## 2020-02-27 NOTE — PROGRESS NOTES
SUBJECTIVE:   CC: Meredith Mello is an 29 year old woman who presents for preventive health visit.     Healthy Habits:     Getting at least 3 servings of Calcium per day:  Yes    Bi-annual eye exam:  Yes    Dental care twice a year:  NO    Sleep apnea or symptoms of sleep apnea:  Daytime drowsiness    Diet:  Regular (no restrictions)    Frequency of exercise:  1 day/week    Duration of exercise:  15-30 minutes    Taking medications regularly:  Yes    Medication side effects:  None    PHQ-2 Total Score: 0    Additional concerns today:  No    She is doing well on Lexapro and overall her anxiety has been well-controlled.  Her dad passed away in July and she is struggling with some grief.  She has been trying to find a grief therapist but there have been long waiting lists.     Today's PHQ-2 Score:   PHQ-2 ( 1999 Pfizer) 2/27/2020   Q1: Little interest or pleasure in doing things 0   Q2: Feeling down, depressed or hopeless 0   PHQ-2 Score 0   Q1: Little interest or pleasure in doing things Not at all   Q2: Feeling down, depressed or hopeless Not at all   PHQ-2 Score 0     Answers for HPI/ROS submitted by the patient on 2/27/2020   Annual Exam:  SHERRI 7 TOTAL SCORE: 1    Abuse: Current or Past(Physical, Sexual or Emotional)- No  Do you feel safe in your environment? Yes        Social History     Tobacco Use     Smoking status: Never Smoker     Smokeless tobacco: Never Used   Substance Use Topics     Alcohol use: Yes     Comment: occasional     If you drink alcohol do you typically have >3 drinks per day or >7 drinks per week? No    Alcohol Use 2/27/2020   Prescreen: >3 drinks/day or >7 drinks/week? No   Prescreen: >3 drinks/day or >7 drinks/week? -       Reviewed orders with patient.  Reviewed health maintenance and updated orders accordingly - Yes          Pertinent mammograms are reviewed under the imaging tab.  History of abnormal Pap smear: NO - age 21-29 PAP every 3 years recommended     Reviewed and updated as  "needed this visit by clinical staff  Tobacco  Allergies  Med Hx  Surg Hx  Fam Hx  Soc Hx        Reviewed and updated as needed this visit by Provider            Review of Systems   Constitutional: Negative for chills and fever.   HENT: Positive for congestion. Negative for ear pain, hearing loss and sore throat.    Eyes: Negative for pain and visual disturbance.   Respiratory: Negative for cough and shortness of breath.    Cardiovascular: Negative for chest pain, palpitations and peripheral edema.   Gastrointestinal: Negative for abdominal pain, constipation, diarrhea, heartburn, hematochezia and nausea.   Breasts:  Negative for tenderness, breast mass and discharge.   Genitourinary: Negative for dysuria, frequency, genital sores, hematuria, pelvic pain, urgency, vaginal bleeding and vaginal discharge.   Musculoskeletal: Negative for arthralgias, joint swelling and myalgias.   Skin: Negative for rash.   Neurological: Positive for headaches. Negative for dizziness, weakness and paresthesias.   Psychiatric/Behavioral: Negative for mood changes. The patient is nervous/anxious.           OBJECTIVE:   /68   Pulse 60   Temp 98.8  F (37.1  C) (Oral)   Resp 16   Ht 1.689 m (5' 6.5\")   Wt 79.4 kg (175 lb)   LMP  (LMP Unknown)   Breastfeeding No   BMI 27.82 kg/m    Physical Exam  GENERAL: healthy, alert and no distress  EYES: Eyes grossly normal to inspection, PERRL and conjunctivae and sclerae normal  HENT: ear canals and TM's normal, nose and mouth without ulcers or lesions  NECK: no adenopathy, no asymmetry, masses, or scars and thyroid normal to palpation  RESP: lungs clear to auscultation - no rales, rhonchi or wheezes  BREAST: normal without masses, tenderness or nipple discharge and no palpable axillary masses or adenopathy  CV: regular rate and rhythm, normal S1 S2, no S3 or S4, no murmur, click or rub, no peripheral edema and peripheral pulses strong  ABDOMEN: soft, nontender, no " "hepatosplenomegaly, no masses and bowel sounds normal  MS: no gross musculoskeletal defects noted, no edema  SKIN: no suspicious lesions or rashes  NEURO: Normal strength and tone, mentation intact and speech normal  PSYCH: mentation appears normal, affect normal/bright        ASSESSMENT/PLAN:   (Z00.00) Routine general medical examination at a health care facility  (primary encounter diagnosis)  Comment:   Plan: Lipid panel reflex to direct LDL Fasting            (F41.1) SHERRI (generalized anxiety disorder)  Comment:   Plan: MENTAL HEALTH REFERRAL  - Adult; Outpatient         Treatment; Individual/Couples/Family/Group         Therapy/Health Psychology; [facility] [place]: [facility] [phone]; We will         contact you to schedule the appointment or         please call with any questions, escitalopram         (LEXAPRO) 10 MG tablet        The current medical regimen is effective;  continue present plan and medications.   Will refer to a therapist for grief counseling.     (F43.21) Grief  Comment:   Plan: MENTAL HEALTH REFERRAL  - Adult; Outpatient         Treatment; Individual/Couples/Family/Group         Therapy/Health Psychology; [facility] [place]: [facility] [phone]; We will         contact you to schedule the appointment or         please call with any questions        See above.       COUNSELING:  Reviewed preventive health counseling, as reflected in patient instructions    Estimated body mass index is 27.82 kg/m  as calculated from the following:    Height as of this encounter: 1.689 m (5' 6.5\").    Weight as of this encounter: 79.4 kg (175 lb).    Weight management plan: Discussed healthy diet and exercise guidelines     reports that she has never smoked. She has never used smokeless tobacco.      Counseling Resources:  ATP IV Guidelines  Pooled Cohorts Equation Calculator  Breast Cancer Risk Calculator  FRAX Risk Assessment  ICSI Preventive Guidelines  Dietary Guidelines " for Americans, 2010  USDA's MyPlate  ASA Prophylaxis  Lung CA Screening    Didi Harvey, PASTORA  [facility]

## 2020-02-28 ASSESSMENT — ANXIETY QUESTIONNAIRES: GAD7 TOTAL SCORE: 1

## 2020-07-22 ENCOUNTER — OFFICE VISIT (OUTPATIENT)
Dept: MIDWIFE SERVICES | Facility: CLINIC | Age: 30
End: 2020-07-22
Payer: COMMERCIAL

## 2020-07-22 VITALS
SYSTOLIC BLOOD PRESSURE: 118 MMHG | DIASTOLIC BLOOD PRESSURE: 72 MMHG | BODY MASS INDEX: 29.08 KG/M2 | WEIGHT: 182.9 LBS | TEMPERATURE: 97.9 F | HEART RATE: 66 BPM

## 2020-07-22 DIAGNOSIS — Z30.430 ENCOUNTER FOR IUD INSERTION: Primary | ICD-10-CM

## 2020-07-22 DIAGNOSIS — Z30.433 ENCOUNTER FOR REMOVAL AND REINSERTION OF INTRAUTERINE CONTRACEPTIVE DEVICE: ICD-10-CM

## 2020-07-22 LAB — HCG UR QL: NEGATIVE

## 2020-07-22 PROCEDURE — 58300 INSERT INTRAUTERINE DEVICE: CPT | Performed by: ADVANCED PRACTICE MIDWIFE

## 2020-07-22 PROCEDURE — 81025 URINE PREGNANCY TEST: CPT | Performed by: ADVANCED PRACTICE MIDWIFE

## 2020-07-22 PROCEDURE — 58301 REMOVE INTRAUTERINE DEVICE: CPT | Performed by: ADVANCED PRACTICE MIDWIFE

## 2020-07-22 NOTE — NURSING NOTE
"Chief Complaint   Patient presents with     IUD     mirena removal and replacement, NDC: 96160-096-13, LOT :[id number], exp: 10/2022       Initial /72   Pulse 66   Temp 97.9  F (36.6  C) (Oral)   Wt 83 kg (182 lb 14.4 oz)   LMP 2020 (Approximate)   BMI 29.08 kg/m   Estimated body mass index is 29.08 kg/m  as calculated from the following:    Height as of 20: 1.689 m (5' 6.5\").    Weight as of this encounter: 83 kg (182 lb 14.4 oz).  BP completed using cuff size: regular    Questioned patient about current smoking habits.  Pt. has never smoked.          The following HM Due: NONE      The following patient reported/Care Every where data was sent to:  P ABSTRACT QUALITY INITIATIVES [[id number]]        patient has appointment for today  Minna Anne                "

## 2020-07-22 NOTE — PROGRESS NOTES
SUBJECTIVE:    Is a pregnancy test required: No.  Was a consent obtained?  Yes    Subjective: Meredith Mello is a 30 year old  presents for IUD and desires Mirena type IUD.  She requests removal of the IUD because the IUD effectiveness has     Patient has been given the opportunity to ask questions about all forms of birth control, including all options appropriate for Meredith Mello. Discussed that no method of birth control, except abstinence is 100% effective against pregnancy or sexually transmitted infection.     Meredith Mello understands she may have the IUD removed at any time. IUD should be removed by a health care provider and the current IUD will be removed today.    The entire removal and insertion procedure was reviewed with the patient, including care after placement.    Today's PHQ-2 Score:   PHQ-2 (  [employer]) 2020   Q1: Little interest or pleasure in doing things 0   Q2: Feeling down, depressed or hopeless 0   PHQ-2 Score 0   Q1: Little interest or pleasure in doing things Not at all   Q2: Feeling down, depressed or hopeless Not at all   PHQ-2 Score 0       PROCEDURE:    Premedicated with ibuprofen.  A speculum exam was performed and the cervix was visualized. The IUD string was visualized. Using ring forceps, the string  was grasped and the IUD removed intact.    Under sterile technique, cervix was visualized with speculum and prepped with Betadine solution swab x 3. Tenaculum was placed for stability. The uterus was gently straightened and sounded to 7.5 cm. IUD prepared for placement, and IUD inserted according to 's instructions without difficulty or significant ressitance, and deployed at the fundus. The strings were visualized and trimmed to 2.5 cm from the external os. Tenaculum was removed and hemostasis noted. Speculum removed.  Patient tolerated procedure well.    Lot # [id number]  Exp: 10/2022    EBL: minimal    Complications: none      POST  PROCEDURE:    Given 's handouts, including when to have IUD removed, list of danger s/sx, side effects and follow up recommended. Encouraged condom use for prevention of STD. Advised to call for any fever, for prolonged or severe pain or bleeding, abnormal vaginal dischage, or unable to palpate strings. She was advised to use pain medications (ibuprofen) as needed for mild to moderate pain. Advised to follow-up in clinic in 4-6 weeks for IUD string check if unable to find strings or as directed by provider.   SHAYLA Arambula CNM

## 2020-12-27 ENCOUNTER — HEALTH MAINTENANCE LETTER (OUTPATIENT)
Age: 30
End: 2020-12-27

## 2021-03-10 DIAGNOSIS — F41.1 GAD (GENERALIZED ANXIETY DISORDER): ICD-10-CM

## 2021-03-11 RX ORDER — ESCITALOPRAM OXALATE 10 MG/1
10 TABLET ORAL DAILY
Qty: 30 TABLET | Refills: 0 | Status: SHIPPED | OUTPATIENT
Start: 2021-03-11 | End: 2021-04-21

## 2021-04-03 DIAGNOSIS — F41.1 GAD (GENERALIZED ANXIETY DISORDER): ICD-10-CM

## 2021-04-09 RX ORDER — ESCITALOPRAM OXALATE 10 MG/1
10 TABLET ORAL DAILY
Qty: 30 TABLET | Refills: 0 | OUTPATIENT
Start: 2021-04-09

## 2021-04-21 ENCOUNTER — VIRTUAL VISIT (OUTPATIENT)
Dept: FAMILY MEDICINE | Facility: CLINIC | Age: 31
End: 2021-04-21
Payer: COMMERCIAL

## 2021-04-21 DIAGNOSIS — F41.1 GAD (GENERALIZED ANXIETY DISORDER): ICD-10-CM

## 2021-04-21 PROCEDURE — 99213 OFFICE O/P EST LOW 20 MIN: CPT | Mod: 95 | Performed by: NURSE PRACTITIONER

## 2021-04-21 RX ORDER — ESCITALOPRAM OXALATE 10 MG/1
10 TABLET ORAL DAILY
Qty: 90 TABLET | Status: SHIPPED | OUTPATIENT
Start: 2021-04-21 | End: 2022-09-14

## 2021-04-21 ASSESSMENT — PATIENT HEALTH QUESTIONNAIRE - PHQ9: 5. POOR APPETITE OR OVEREATING: SEVERAL DAYS

## 2021-04-21 ASSESSMENT — ANXIETY QUESTIONNAIRES
2. NOT BEING ABLE TO STOP OR CONTROL WORRYING: SEVERAL DAYS
3. WORRYING TOO MUCH ABOUT DIFFERENT THINGS: SEVERAL DAYS
5. BEING SO RESTLESS THAT IT IS HARD TO SIT STILL: NOT AT ALL
7. FEELING AFRAID AS IF SOMETHING AWFUL MIGHT HAPPEN: NOT AT ALL
GAD7 TOTAL SCORE: 5
6. BECOMING EASILY ANNOYED OR IRRITABLE: SEVERAL DAYS
1. FEELING NERVOUS, ANXIOUS, OR ON EDGE: SEVERAL DAYS

## 2021-04-21 NOTE — PROGRESS NOTES
Meredith is a 30 year old who is being evaluated via a billable video visit.      How would you like to obtain your AVS? MyChart  If the video visit is dropped, the invitation should be resent by: Text to cell phone: [phone]  Will anyone else be joining your video visit? No    Video Start Time: 8:24 AM    Assessment & Plan     SHERRI (generalized anxiety disorder)  Well-managed.  The current medical regimen is effective;  continue present plan and medications.   - escitalopram (LEXAPRO) 10 MG tablet; Take 1 tablet (10 mg) by mouth daily    Return in about 1 year (around 4/21/2022).    Didi Harvey NP  [facility]   Meredith is a 30 year old who presents for the following health issues     HPI     She is experiencing a lot of work stress.  Difficult leadership.  She recently started talking to someone through [employer] and [employer], feels more manageable.  Overall, she feels that her dose of Lexapro is working well and her anxiety is controlled.  She denies any side effects.      Review of Systems         Objective    Vitals - Patient Reported  Weight (Patient Reported): 79.4 kg (175 lb)      Vitals:  No vitals were obtained today due to virtual visit.    Physical Exam   GENERAL: Healthy, alert and no distress  EYES: Eyes grossly normal to inspection.  No discharge or erythema, or obvious scleral/conjunctival abnormalities.  RESP: No audible wheeze, cough, or visible cyanosis.  No visible retractions or increased work of breathing.    SKIN: Visible skin clear. No significant rash, abnormal pigmentation or lesions.  NEURO: Cranial nerves grossly intact.  Mentation and speech appropriate for age.  PSYCH: Mentation appears normal, affect normal/bright, judgement and insight intact, normal speech and appearance well-groomed. SHERRI-7 score of 5                Video-Visit Details    Type of service:  Video Visit    Video End Time:8:36 AM    Originating Location (pt. Location): Home    Distant  Location (provider location):  [facility]     Platform used for Video Visit: [employer]

## 2021-04-22 ASSESSMENT — ANXIETY QUESTIONNAIRES: GAD7 TOTAL SCORE: 5

## 2021-04-24 ENCOUNTER — HEALTH MAINTENANCE LETTER (OUTPATIENT)
Age: 31
End: 2021-04-24

## 2021-10-09 ENCOUNTER — HEALTH MAINTENANCE LETTER (OUTPATIENT)
Age: 31
End: 2021-10-09

## 2021-10-12 ENCOUNTER — VIRTUAL VISIT (OUTPATIENT)
Dept: FAMILY MEDICINE | Facility: CLINIC | Age: 31
End: 2021-10-12
Payer: COMMERCIAL

## 2021-10-12 DIAGNOSIS — F41.1 GAD (GENERALIZED ANXIETY DISORDER): Primary | ICD-10-CM

## 2021-10-12 PROCEDURE — 99214 OFFICE O/P EST MOD 30 MIN: CPT | Mod: GT | Performed by: FAMILY MEDICINE

## 2021-10-12 PROCEDURE — 96127 BRIEF EMOTIONAL/BEHAV ASSMT: CPT | Performed by: FAMILY MEDICINE

## 2021-10-12 RX ORDER — BUSPIRONE HYDROCHLORIDE 5 MG/1
5 TABLET ORAL 2 TIMES DAILY
Qty: 60 TABLET | Refills: 1 | Status: SHIPPED | OUTPATIENT
Start: 2021-10-12 | End: 2021-11-30

## 2021-10-12 ASSESSMENT — ANXIETY QUESTIONNAIRES
IF YOU CHECKED OFF ANY PROBLEMS ON THIS QUESTIONNAIRE, HOW DIFFICULT HAVE THESE PROBLEMS MADE IT FOR YOU TO DO YOUR WORK, TAKE CARE OF THINGS AT HOME, OR GET ALONG WITH OTHER PEOPLE: VERY DIFFICULT
6. BECOMING EASILY ANNOYED OR IRRITABLE: MORE THAN HALF THE DAYS
2. NOT BEING ABLE TO STOP OR CONTROL WORRYING: MORE THAN HALF THE DAYS
7. FEELING AFRAID AS IF SOMETHING AWFUL MIGHT HAPPEN: MORE THAN HALF THE DAYS
GAD7 TOTAL SCORE: 14
5. BEING SO RESTLESS THAT IT IS HARD TO SIT STILL: MORE THAN HALF THE DAYS
1. FEELING NERVOUS, ANXIOUS, OR ON EDGE: MORE THAN HALF THE DAYS
3. WORRYING TOO MUCH ABOUT DIFFERENT THINGS: MORE THAN HALF THE DAYS

## 2021-10-12 ASSESSMENT — PATIENT HEALTH QUESTIONNAIRE - PHQ9
5. POOR APPETITE OR OVEREATING: MORE THAN HALF THE DAYS
SUM OF ALL RESPONSES TO PHQ QUESTIONS 1-9: 7

## 2021-10-12 NOTE — PROGRESS NOTES
Meredith is a 31 year old who is being evaluated via a billable video visit.      How would you like to obtain your AVS? MyChart  If the video visit is dropped, the invitation should be resent by: Text to cell phone: [phone] (M)   Will anyone else be joining your video visit? No      Video Start Time: 5:39 PM    Assessment & Plan     SHERRI (generalized anxiety disorder)  -Worsened, off Lexapro due to side effects  -Amendable to trying Buspar BID to start  -Follow-up in 4 weeks to monitor, return sooner as needed  - busPIRone (BUSPAR) 5 MG tablet  Dispense: 60 tablet; Refill: 1      Return in about 4 weeks (around 11/9/2021) for Follow up, anxiety.    DO ARSENIO [facility]    Subjective   Meredith is a 31 year old who presents for the following health issues     HPI     Here to review anxiety medication. Recent increase in anxiety related to her job, more difficult to manage. On Lexapro 10 mg -gives her a headache, sensitivity to alcohol, decrease libido. Stopped taking the Lexapro about 1 month ago as she could not tolerate the side effects. Feeling steady anxiety throughout the day. No anxiety or panic attacks. Sees a therapist currently. Would like other medication options.     Depression and Anxiety Follow-Up    How are you doing with your depression since your last visit? Worsened     How are you doing with your anxiety since your last visit?  Worsened     Are you having other symptoms that might be associated with depression or anxiety? Yes:  feelings of panic and out of control     Have you had a significant life event? Job Concerns- stressful job     Do you have any concerns with your use of alcohol or other drugs? No    Social History     Tobacco Use     Smoking status: Never Smoker     Smokeless tobacco: Never Used   Substance Use Topics     Alcohol use: Yes     Comment: occasional     Drug use: Yes     Types: Marijuana     PHQ 10/12/2021   PHQ-9 Total Score 7   Q9: Thoughts of  better off dead/self-harm past 2 weeks Not at all     SHERRI-7 SCORE 2/27/2020 4/21/2021 10/12/2021   Total Score 1 (minimal anxiety) - -   Total Score 1 5 14       Review of Systems   CONSTITUTIONAL: NEGATIVE for fever, chills, change in weight  RESP: NEGATIVE for significant cough or SOB  CV: NEGATIVE for chest pain, palpitations or peripheral edema  NEURO: NEGATIVE for weakness, dizziness or paresthesias      Objective           Vitals:  No vitals were obtained today due to virtual visit.    Physical Exam   GENERAL: Healthy, alert and no distress  EYES: Eyes grossly normal to inspection.  No discharge or erythema, or obvious scleral/conjunctival abnormalities.  RESP: No audible wheeze, cough, or visible cyanosis.  No visible retractions or increased work of breathing.    SKIN: Visible skin clear. No significant rash, abnormal pigmentation or lesions.  NEURO: Cranial nerves grossly intact.  Mentation and speech appropriate for age.  PSYCH: Mentation appears normal, affect normal/bright, judgement and insight intact, normal speech and appearance well-groomed.        Video-Visit Details    Type of service:  Video Visit    Video End Time:5:39 PM    Originating Location (pt. Location): Home    Distant Location (provider location):  [facility]     Platform used for Video Visit: [employer]

## 2021-10-13 ASSESSMENT — ANXIETY QUESTIONNAIRES: GAD7 TOTAL SCORE: 14

## 2021-11-29 DIAGNOSIS — F41.1 GAD (GENERALIZED ANXIETY DISORDER): ICD-10-CM

## 2021-11-30 RX ORDER — BUSPIRONE HYDROCHLORIDE 5 MG/1
TABLET ORAL
Qty: 60 TABLET | Refills: 0 | Status: SHIPPED | OUTPATIENT
Start: 2021-11-30 | End: 2021-12-23

## 2021-11-30 NOTE — TELEPHONE ENCOUNTER
Prescription approved per [facility] Refill Protocol.  Bianca Renae RN  [facility]

## 2021-12-23 DIAGNOSIS — F41.1 GAD (GENERALIZED ANXIETY DISORDER): ICD-10-CM

## 2021-12-23 RX ORDER — BUSPIRONE HYDROCHLORIDE 5 MG/1
TABLET ORAL
Qty: 60 TABLET | Refills: 0 | Status: SHIPPED | OUTPATIENT
Start: 2021-12-23 | End: 2022-09-14

## 2021-12-23 NOTE — TELEPHONE ENCOUNTER
Per 10/12/2021 visit: Return in about 4 weeks (around 11/9/2021) for Follow up, anxiety.    TC: please call and schedule visit.    Thank you

## 2022-05-16 ENCOUNTER — HEALTH MAINTENANCE LETTER (OUTPATIENT)
Age: 32
End: 2022-05-16

## 2022-05-23 ENCOUNTER — LAB REQUISITION (OUTPATIENT)
Dept: LAB | Facility: CLINIC | Age: 32
End: 2022-05-23

## 2022-05-23 PROCEDURE — U0005 INFEC AGEN DETEC AMPLI PROBE: HCPCS | Performed by: INTERNAL MEDICINE

## 2022-05-24 LAB — SARS-COV-2 RNA RESP QL NAA+PROBE: POSITIVE

## 2022-09-11 ENCOUNTER — HEALTH MAINTENANCE LETTER (OUTPATIENT)
Age: 32
End: 2022-09-11

## 2022-09-14 ENCOUNTER — ANCILLARY PROCEDURE (OUTPATIENT)
Dept: GENERAL RADIOLOGY | Facility: CLINIC | Age: 32
End: 2022-09-14
Attending: NURSE PRACTITIONER
Payer: COMMERCIAL

## 2022-09-14 ENCOUNTER — OFFICE VISIT (OUTPATIENT)
Dept: FAMILY MEDICINE | Facility: CLINIC | Age: 32
End: 2022-09-14
Payer: COMMERCIAL

## 2022-09-14 VITALS
RESPIRATION RATE: 20 BRPM | TEMPERATURE: 98.6 F | DIASTOLIC BLOOD PRESSURE: 60 MMHG | HEIGHT: 65 IN | SYSTOLIC BLOOD PRESSURE: 102 MMHG | WEIGHT: 183.6 LBS | OXYGEN SATURATION: 100 % | HEART RATE: 68 BPM | BODY MASS INDEX: 30.59 KG/M2

## 2022-09-14 DIAGNOSIS — F41.1 GAD (GENERALIZED ANXIETY DISORDER): ICD-10-CM

## 2022-09-14 DIAGNOSIS — M20.12 HALLUX VALGUS, ACQUIRED, LEFT: ICD-10-CM

## 2022-09-14 DIAGNOSIS — Z00.00 ROUTINE GENERAL MEDICAL EXAMINATION AT A HEALTH CARE FACILITY: Primary | ICD-10-CM

## 2022-09-14 DIAGNOSIS — Z13.220 SCREENING FOR HYPERLIPIDEMIA: ICD-10-CM

## 2022-09-14 DIAGNOSIS — M21.612 BUNION, LEFT: ICD-10-CM

## 2022-09-14 DIAGNOSIS — R35.0 URINARY FREQUENCY: ICD-10-CM

## 2022-09-14 DIAGNOSIS — Z12.4 CERVICAL CANCER SCREENING: ICD-10-CM

## 2022-09-14 LAB
ALBUMIN UR-MCNC: NEGATIVE MG/DL
APPEARANCE UR: CLEAR
BACTERIA #/AREA URNS HPF: ABNORMAL /HPF
BILIRUB UR QL STRIP: NEGATIVE
COLOR UR AUTO: YELLOW
GLUCOSE UR STRIP-MCNC: NEGATIVE MG/DL
HGB UR QL STRIP: ABNORMAL
KETONES UR STRIP-MCNC: NEGATIVE MG/DL
LEUKOCYTE ESTERASE UR QL STRIP: NEGATIVE
NITRATE UR QL: NEGATIVE
PH UR STRIP: 7.5 [PH] (ref 5–7)
RBC #/AREA URNS AUTO: ABNORMAL /HPF
SP GR UR STRIP: 1.01 (ref 1–1.03)
SQUAMOUS #/AREA URNS AUTO: ABNORMAL /LPF
UROBILINOGEN UR STRIP-ACNC: 0.2 E.U./DL
WBC #/AREA URNS AUTO: ABNORMAL /HPF

## 2022-09-14 PROCEDURE — 73630 X-RAY EXAM OF FOOT: CPT | Mod: TC | Performed by: RADIOLOGY

## 2022-09-14 PROCEDURE — 81001 URINALYSIS AUTO W/SCOPE: CPT | Performed by: NURSE PRACTITIONER

## 2022-09-14 PROCEDURE — 87624 HPV HI-RISK TYP POOLED RSLT: CPT | Performed by: NURSE PRACTITIONER

## 2022-09-14 PROCEDURE — 99395 PREV VISIT EST AGE 18-39: CPT | Performed by: NURSE PRACTITIONER

## 2022-09-14 PROCEDURE — 99214 OFFICE O/P EST MOD 30 MIN: CPT | Mod: 25 | Performed by: NURSE PRACTITIONER

## 2022-09-14 PROCEDURE — G0145 SCR C/V CYTO,THINLAYER,RESCR: HCPCS | Performed by: NURSE PRACTITIONER

## 2022-09-14 RX ORDER — BUPROPION HYDROCHLORIDE 150 MG/1
150 TABLET, EXTENDED RELEASE ORAL DAILY
Qty: 56 TABLET | Refills: 0 | Status: SHIPPED | OUTPATIENT
Start: 2022-09-14 | End: 2022-11-10

## 2022-09-14 ASSESSMENT — ENCOUNTER SYMPTOMS
DIARRHEA: 0
MYALGIAS: 0
CONSTIPATION: 0
CHILLS: 0
HEADACHES: 1
PARESTHESIAS: 0
FREQUENCY: 1
HEMATOCHEZIA: 0
EYE PAIN: 0
NERVOUS/ANXIOUS: 1
HEMATURIA: 0
ARTHRALGIAS: 0
DYSURIA: 0
SHORTNESS OF BREATH: 0
JOINT SWELLING: 0
PALPITATIONS: 0
HEARTBURN: 0
FEVER: 0
COUGH: 0
ABDOMINAL PAIN: 0
WEAKNESS: 0
BREAST MASS: 0
NAUSEA: 0
SORE THROAT: 0
DIZZINESS: 1

## 2022-09-14 NOTE — PROGRESS NOTES
SUBJECTIVE:   CC: Meredith is an 32 year old who presents for preventive health visit.     32 year old year old female  with PMH   Patient Active Problem List   Diagnosis Code     CARDIOVASCULAR SCREENING; LDL GOAL LESS THAN 160 Z13.6     Generalized anxiety disorder F41.1     Tonsil asymmetry J35.8     History of abnormal cervical Pap smear Z87.42     Eczema L30.9     Menorrhagia with regular cycle N92.0    in clinic for preventive health care exam. PCP Dr. Harvey.     In addition to the preventive visit, 35  minutes of the appointment were spent evaluating and developing a treatment plan for her additional concern(s).      Patient has been advised of split billing requirements and indicates understanding: Yes     PROBLEMS TO ADD ON.  - SHERRI: patient stopped lexapro and buspar d/t side effects increase HA; mild/moderate depression    PHQ-9 score:    PHQ 10/12/2021   PHQ-9 Total Score 7   Q9: Thoughts of better off dead/self-harm past 2 weeks Not at all     SHERRI-7 SCORE 2/27/2020 4/21/2021 10/12/2021   Total Score 1 (minimal anxiety) - -   Total Score 1 5 14         - urinary frequency: getting up several times a night; several times before bed; fine during the day; drinks fluids late at night     - left foot:  Pain related to possible bunion great toe; painful    -  Right inner thigh bump: present with shaving; ? Ingrown hair; no drainage; mild redness and tenderness; no concern for STI    Healthy Habits:     Getting at least 3 servings of Calcium per day:  NO    Bi-annual eye exam:  Yes    Dental care twice a year:  Yes    Sleep apnea or symptoms of sleep apnea:  None    Diet:  Regular (no restrictions)    Frequency of exercise:  2-3 days/week    Duration of exercise:  30-45 minutes    Taking medications regularly:  Not Applicable    Medication side effects:  Not applicable    PHQ-2 Total Score: 0    Additional concerns today:  No      Today's PHQ-2 Score:   PHQ-2 ( 1999 [employer]) 9/14/2022   Q1: Little interest or  pleasure in doing things 0   Q2: Feeling down, depressed or hopeless 0   PHQ-2 Score 0   PHQ-2 Total Score (12-17 Years)- Positive if 3 or more points; Administer PHQ-A if positive -   Q1: Little interest or pleasure in doing things Not at all   Q2: Feeling down, depressed or hopeless Not at all   PHQ-2 Score 0       Abuse: Current or Past (Physical, Sexual or Emotional) - No  Do you feel safe in your environment? Yes    Have you ever done Advance Care Planning? (For example, a Health Directive, POLST, or a discussion with a medical provider or your loved ones about your wishes): No, advance care planning information given to patient to review.  Patient plans to discuss their wishes with loved ones or provider.      Social History     Tobacco Use     Smoking status: Never Smoker     Smokeless tobacco: Never Used   Substance Use Topics     Alcohol use: Yes     Comment: occasional     If you drink alcohol do you typically have >3 drinks per day or >7 drinks per week? No    Alcohol Use 9/14/2022   Prescreen: >3 drinks/day or >7 drinks/week? No   Prescreen: >3 drinks/day or >7 drinks/week? -   No flowsheet data found.    Reviewed orders with patient.  Reviewed health maintenance and updated orders accordingly - Yes  Lab work is in process  Labs reviewed in EPIC  BP Readings from Last 3 Encounters:   09/14/22 102/60   07/22/20 118/72   02/27/20 102/68    Wt Readings from Last 3 Encounters:   09/14/22 83.3 kg (183 lb 9.6 oz)   07/22/20 83 kg (182 lb 14.4 oz)   02/27/20 79.4 kg (175 lb)               Breast Cancer Screening:    Breast CA Risk Assessment (FHS-7) 9/7/2022   Do you have a family history of breast, colon, or ovarian cancer? No / Unknown         Patient under 40 years of age: Routine Mammogram Screening not recommended.   Pertinent mammograms are reviewed under the imaging tab.    History of abnormal Pap smear: NO - age 30-65 PAP every 5 years with negative HPV co-testing recommended  PAP / HPV Latest Ref Rng &  "Units 9/14/2022   PAP   Negative for Intraepithelial Lesion or Malignancy (NILM)   HPV16 Negative Negative   HPV18 Negative Negative   HRHPV Negative Negative     Reviewed and updated as needed this visit by clinical staff   Tobacco  Allergies  Meds  Problems  Med Hx  Surg Hx  Fam Hx  Soc   Hx          Reviewed and updated as needed this visit by Provider   Tobacco  Allergies  Meds  Problems  Med Hx  Surg Hx  Fam Hx               Review of Systems   Constitutional: Negative for chills and fever.   HENT: Negative for congestion, ear pain, hearing loss and sore throat.    Eyes: Negative for pain and visual disturbance.   Respiratory: Negative for cough and shortness of breath.    Cardiovascular: Negative for chest pain, palpitations and peripheral edema.   Gastrointestinal: Negative for abdominal pain, constipation, diarrhea, heartburn, hematochezia and nausea.   Breasts:  Negative for tenderness, breast mass and discharge.   Genitourinary: Positive for frequency. Negative for dysuria, genital sores, hematuria, pelvic pain, urgency, vaginal bleeding and vaginal discharge.   Musculoskeletal: Negative for arthralgias, joint swelling and myalgias.   Skin: Negative for rash.   Neurological: Positive for dizziness and headaches. Negative for weakness and paresthesias.   Psychiatric/Behavioral: Negative for mood changes. The patient is nervous/anxious.           OBJECTIVE:   /60 (BP Location: Right arm, Patient Position: Sitting, Cuff Size: Adult Regular)   Pulse 68   Temp 98.6  F (37  C) (Temporal)   Resp 20   Ht 1.66 m (5' 5.35\")   Wt 83.3 kg (183 lb 9.6 oz)   SpO2 100%   BMI 30.22 kg/m    Physical Exam  Constitutional:       General: She is not in acute distress.     Appearance: She is well-developed.   HENT:      Right Ear: Tympanic membrane and external ear normal.      Left Ear: Tympanic membrane and external ear normal.      Nose: Nose normal.      Mouth/Throat:      Pharynx: No " oropharyngeal exudate.   Eyes:      General:         Right eye: No discharge.         Left eye: No discharge.      Conjunctiva/sclera: Conjunctivae normal.      Pupils: Pupils are equal, round, and reactive to light.   Neck:      Thyroid: No thyromegaly.      Trachea: No tracheal deviation.   Cardiovascular:      Rate and Rhythm: Normal rate and regular rhythm.      Pulses: Normal pulses.      Heart sounds: Normal heart sounds, S1 normal and S2 normal. No murmur heard.    No friction rub. No S3 or S4 sounds.   Pulmonary:      Effort: Pulmonary effort is normal. No respiratory distress.      Breath sounds: Normal breath sounds. No wheezing or rales.   Chest:   Breasts:      Right: No mass, nipple discharge or tenderness.      Left: No mass, nipple discharge or tenderness.       Abdominal:      General: Bowel sounds are normal.      Palpations: Abdomen is soft. There is no mass.      Tenderness: There is no abdominal tenderness.   Genitourinary:     Cervix: No cervical motion tenderness or discharge.      Comments: Pelvic exam: bimanual exam showed that uterus and adnexa were normal in size without masses palpable.      Right inner thigh small erythematous lesion consistent w/folliculitis   Musculoskeletal:         General: Normal range of motion.      Cervical back: Neck supple.   Lymphadenopathy:      Cervical: No cervical adenopathy.   Skin:     General: Skin is warm and dry.      Findings: No rash.   Neurological:      Mental Status: She is alert and oriented to person, place, and time.      Motor: No abnormal muscle tone.      Deep Tendon Reflexes: Reflexes are normal and symmetric.   Psychiatric:         Thought Content: Thought content normal.         Judgment: Judgment normal.           Diagnostic Test Results:  Labs reviewed in Epic    ASSESSMENT/PLAN:   Meredith was seen today for physical.    Diagnoses and all orders for this visit:    Routine general medical examination at a health care facility  Preventative  exam w/no abnormalities and/or concerns listed in diagnoses; discussed health maintenance screenings including prostate, breast, cervical and colorectal ca screenings related to gender;  reviewed and reconciled medication, medical history and patient related health concerns  Plan: obtain metabolic labs  -     Lipid Profile; Future  -     CBC with platelets; Future  -     Comprehensive metabolic panel; Future    Cervical cancer screening  No abnormal findings; follow up per guidelines pending results  -     Pap Screen with HPV - recommended age 30 - 65 years  -     HPV Hold (Lab Only)  -     HPV High Risk Types DNA Cervical    Screening for hyperlipidemia  -     Lipid Profile; Future    Urinary frequency  - recommend reducing/stopping fluids at 6 pm; will check UA today for infection; follow up if no improvement; ?OAB discussed possibility of ditropan at bedtime   -     UA reflex to Microscopic and Culture; Future  -     UA reflex to Microscopic and Culture  -     Urine Microscopic    SHERRI (generalized anxiety disorder)  PHQ-9 score:  Mild/moderate depression; anxiety increase since stopping selective serotonin reuptake inhibitor and prn Buspar; discussed trial of SEROTONIN AND NOREPINEPHRINE REUPTAKE INHIBITORS   PHQ 10/12/2021   PHQ-9 Total Score 7   Q9: Thoughts of better off dead/self-harm past 2 weeks Not at all    -     buPROPion (WELLBUTRIN SR) 150 MG 12 hr tablet; Take 1 tablet (150 mg) by mouth daily for 56 days  - follow up in 6 weeks    Hallux valgus, acquired, left  -     XR Foot Left G/E 3 Views; Future    -   podiatry referral    Other orders  -     REVIEW OF HEALTH MAINTENANCE PROTOCOL ORDERS  -     PRIMARY CARE FOLLOW-UP SCHEDULING; Future      Patient has been advised of split billing requirements and indicates understanding: Yes    COUNSELING:  Reviewed preventive health counseling, as reflected in patient instructions       Contraception       Family planning    Estimated body mass index is 30.22  "kg/m  as calculated from the following:    Height as of this encounter: 1.66 m (5' 5.35\").    Weight as of this encounter: 83.3 kg (183 lb 9.6 oz).    Weight management plan: Discussed healthy diet and exercise guidelines    She reports that she has never smoked. She has never used smokeless tobacco.      Counseling Resources:  ATP IV Guidelines  Pooled Cohorts Equation Calculator  Breast Cancer Risk Calculator  BRCA-Related Cancer Risk Assessment: FHS-7 Tool  FRAX Risk Assessment  ICSI Preventive Guidelines  Dietary Guidelines for Americans, 2010  USDA's MyPlate  ASA Prophylaxis  Lung CA Screening    SHAYLA Ta CNP  [facility]  "

## 2022-09-16 LAB
BKR LAB AP GYN ADEQUACY: NORMAL
BKR LAB AP GYN INTERPRETATION: NORMAL
BKR LAB AP HPV REFLEX: NORMAL
BKR LAB AP LMP: NORMAL
BKR LAB AP PREVIOUS ABNORMAL: NORMAL
PATH REPORT.COMMENTS IMP SPEC: NORMAL
PATH REPORT.COMMENTS IMP SPEC: NORMAL
PATH REPORT.RELEVANT HX SPEC: NORMAL

## 2022-09-19 LAB
HUMAN PAPILLOMA VIRUS 16 DNA: NEGATIVE
HUMAN PAPILLOMA VIRUS 18 DNA: NEGATIVE
HUMAN PAPILLOMA VIRUS FINAL DIAGNOSIS: NORMAL
HUMAN PAPILLOMA VIRUS OTHER HR: NEGATIVE

## 2022-09-20 PROBLEM — Z87.42 HISTORY OF ABNORMAL CERVICAL PAP SMEAR: Status: ACTIVE | Noted: 2022-09-20

## 2022-09-20 PROBLEM — N92.0 MENORRHAGIA WITH REGULAR CYCLE: Status: ACTIVE | Noted: 2022-09-20

## 2022-09-24 NOTE — RESULT ENCOUNTER NOTE
Hi Meredith,     Your foot x-ray does indicate a small bunion and slight  subluxation (Subluxation and dislocation of the sesamoids occur with displacement of the tendons in hallux valgus (bunion).    Please continue with the Podiatry referral as ordered.      Please let me know if you have any questions or concerns.     RegardsOscar APRN CNP  Hi Meredith,     Your foot x-ray does indicate a small bunion and slight  subluxation (Subluxation and dislocation of the sesamoids occur with displacement of the tendons in hallux valgus (bunion).    Please continue with the Podiatry referral as ordered during in clinic visit.      Please let me know if you have any questions or concerns.     Oscar Alston APRN CNP

## 2022-11-09 DIAGNOSIS — F41.1 GAD (GENERALIZED ANXIETY DISORDER): ICD-10-CM

## 2022-11-10 RX ORDER — BUPROPION HYDROCHLORIDE 150 MG/1
150 TABLET, EXTENDED RELEASE ORAL DAILY
Qty: 56 TABLET | Refills: 0 | Status: SHIPPED | OUTPATIENT
Start: 2022-11-10 | End: 2023-05-09

## 2022-11-10 NOTE — TELEPHONE ENCOUNTER
Routing refill request to provider for review/approval because:  --Sig appears to indicate time limited medication.      --Last visit:  9/14/2022 [place].    --Future Visit: 11/21/22 [place].    --Last Written Prescription Date:     Disp Refills Start End LONNY   buPROPion (WELLBUTRIN SR) 150 MG 12 hr tablet 56 tablet 0 9/14/2022 11/9/2022 --   Sig - Route: Take 1 tablet (150 mg) by mouth daily for 56 days

## 2022-11-21 ENCOUNTER — VIRTUAL VISIT (OUTPATIENT)
Dept: FAMILY MEDICINE | Facility: CLINIC | Age: 32
End: 2022-11-21
Attending: NURSE PRACTITIONER
Payer: COMMERCIAL

## 2022-11-21 DIAGNOSIS — L30.9 DERMATITIS: ICD-10-CM

## 2022-11-21 DIAGNOSIS — R51.9 CHRONIC DAILY HEADACHE: Primary | ICD-10-CM

## 2022-11-21 PROCEDURE — 99215 OFFICE O/P EST HI 40 MIN: CPT | Mod: 95 | Performed by: NURSE PRACTITIONER

## 2022-11-21 RX ORDER — TRIAMCINOLONE ACETONIDE 5 MG/G
OINTMENT TOPICAL
Qty: 15 G | Refills: 0 | Status: SHIPPED | OUTPATIENT
Start: 2022-11-21

## 2022-11-21 ASSESSMENT — ANXIETY QUESTIONNAIRES
4. TROUBLE RELAXING: NOT AT ALL
6. BECOMING EASILY ANNOYED OR IRRITABLE: NOT AT ALL
IF YOU CHECKED OFF ANY PROBLEMS ON THIS QUESTIONNAIRE, HOW DIFFICULT HAVE THESE PROBLEMS MADE IT FOR YOU TO DO YOUR WORK, TAKE CARE OF THINGS AT HOME, OR GET ALONG WITH OTHER PEOPLE: NOT DIFFICULT AT ALL
1. FEELING NERVOUS, ANXIOUS, OR ON EDGE: SEVERAL DAYS
7. FEELING AFRAID AS IF SOMETHING AWFUL MIGHT HAPPEN: NOT AT ALL
5. BEING SO RESTLESS THAT IT IS HARD TO SIT STILL: NOT AT ALL
GAD7 TOTAL SCORE: 1
7. FEELING AFRAID AS IF SOMETHING AWFUL MIGHT HAPPEN: NOT AT ALL
8. IF YOU CHECKED OFF ANY PROBLEMS, HOW DIFFICULT HAVE THESE MADE IT FOR YOU TO DO YOUR WORK, TAKE CARE OF THINGS AT HOME, OR GET ALONG WITH OTHER PEOPLE?: NOT DIFFICULT AT ALL
GAD7 TOTAL SCORE: 1
2. NOT BEING ABLE TO STOP OR CONTROL WORRYING: NOT AT ALL
3. WORRYING TOO MUCH ABOUT DIFFERENT THINGS: NOT AT ALL

## 2022-11-21 NOTE — PROGRESS NOTES
"Meredith is a 32 year old who is being evaluated via a billable video visit.      How would you like to obtain your AVS?   If the video visit is dropped, the invitation should be resent by:   Will anyone else be joining your video visit?         Assessment & Plan     (R51.9) Chronic daily headache  (primary encounter diagnosis)  Comment:   Plan: rimegepant (NURTEC) 75 MG ODT tablet, Adult         Neurology  Referral        Has tried Naproxen, Ibuprofen, Sumatriptan.  She gets a headache more than 20 days per month.  Will refer to neurology and will try Nurtec every day for prevention.  Discussed the use and indication of this medication as well as potential side effects.     (L30.9) Dermatitis  Comment:   Plan: triamcinolone (KENALOG) 0.5 % external ointment        She states Shontel was going to send in a steroid cream, but never received it.         47 minutes spent on the date of the encounter doing chart review, patient visit and documentation            No follow-ups on file.    Didi Harvey NP  [facility]    Subjective   Meredith is a 32 year old, presenting for the following health issues:   Follow Up and Headache      History of Present Illness       Mental Health Follow-up:  Patient presents to follow-up on Anxiety.    Patient's anxiety since last visit has been:  No change  The patient is having other symptoms associated with anxiety.  Any significant life events: relationship concerns  Patient is feeling anxious or having panic attacks.  Patient has no concerns about alcohol or drug use.    Headaches:   Since the patient's last clinic visit, headaches are: worsened  The patient is getting headaches:  Almost daily  She is able to do normal daily activities when she has a migraine.  The patient is taking the following rescue/relief medications:  Ibuprofen (Advil, Motrin) and Excedrin   Patient states \"The relief is inconsistent\" from the rescue/relief medications.   The " patient is taking the following medications to prevent migraines:  No medications to prevent migraines  In the past 4 weeks, the patient has gone to an Urgent Care or Emergency Room 0 times times due to headaches.    She eats 2-3 servings of fruits and vegetables daily.She consumes 0 sweetened beverage(s) daily.She exercises with enough effort to increase her heart rate 9 or less minutes per day.  She exercises with enough effort to increase her heart rate 3 or less days per week.   She is taking medications regularly.  Today's SHERRI-7 Score: 1       She saw Oscar recently, was given Bupropion for her anxiety.   She would wake up with a headache daily, so she stopped the medication after 4 weeks.  She stopped it 2-3 weeks ago and is still having headaches almost daily.   The headache is often in the frontal area or behind her left eye.  She had the same side effect on Buspar that she tried a year and Lexapro that she tried prior to Buspar.    She has a history of headaches, dating back to adolescence.  She had an MRI about 10 years ago, has tried many medications.    She did try Imitrex in the past, caused terrible muscle spasms in her neck.  She has tried Excedrin, Ibuprofen.  She is taking a medication more than three days a week for the past three months.    Alcohol makes her headaches worse.  She is unable to identify any other triggers.    She feels her anxiety is not unmanageable and prefers to not treat it at this time.          Review of Systems         Objective           Vitals:  No vitals were obtained today due to virtual visit.    Physical Exam   GENERAL: Healthy, alert and no distress  EYES: Eyes grossly normal to inspection.  No discharge or erythema, or obvious scleral/conjunctival abnormalities.  RESP: No audible wheeze, cough, or visible cyanosis.  No visible retractions or increased work of breathing.    SKIN: Visible skin clear. No significant rash, abnormal pigmentation or lesions.  NEURO:  Cranial nerves grossly intact.  Mentation and speech appropriate for age.  PSYCH: Mentation appears normal, affect normal/bright, judgement and insight intact, normal speech and appearance well-groomed.                Video-Visit Details    Video Start Time: 5:33 PM    Type of service:  Video Visit    Video End Time:6:15 PM    Originating Location (pt. Location): Home    Distant Location (provider location):  On-site    Platform used for Video Visit: [employer]ovis

## 2022-11-23 ENCOUNTER — TELEPHONE (OUTPATIENT)
Dept: FAMILY MEDICINE | Facility: CLINIC | Age: 32
End: 2022-11-23

## 2022-11-23 DIAGNOSIS — G43.709 CHRONIC MIGRAINE WITHOUT AURA WITHOUT STATUS MIGRAINOSUS, NOT INTRACTABLE: Primary | ICD-10-CM

## 2022-11-23 DIAGNOSIS — R51.9 CHRONIC DAILY HEADACHE: ICD-10-CM

## 2022-11-23 NOTE — TELEPHONE ENCOUNTER
PRIOR AUTHORIZATION DENIED    Medication: rimegepant (NURTEC) 75 MG ODT tablet - EPA DENIED    Denial Date: 11/23/2022    Denial Rational:      Appeal Information:

## 2022-11-29 ENCOUNTER — MYC MEDICAL ADVICE (OUTPATIENT)
Dept: FAMILY MEDICINE | Facility: CLINIC | Age: 32
End: 2022-11-29

## 2022-11-29 DIAGNOSIS — G43.709 CHRONIC MIGRAINE WITHOUT AURA WITHOUT STATUS MIGRAINOSUS, NOT INTRACTABLE: Primary | ICD-10-CM

## 2022-12-02 RX ORDER — AMITRIPTYLINE HYDROCHLORIDE 10 MG/1
10 TABLET ORAL AT BEDTIME
Qty: 30 TABLET | Refills: 2 | Status: SHIPPED | OUTPATIENT
Start: 2022-12-02 | End: 2023-03-12

## 2022-12-02 NOTE — TELEPHONE ENCOUNTER
Didi -  Please advise.  Nurtec PA was denied. Please see 11/28/22 phone encounter with denial information and what needs to be tried first.    Patient had 2 day migraine on 11/29/22 when she sent her message  Miladis Colmenares RN  [facility]

## 2023-01-22 NOTE — TELEPHONE ENCOUNTER
FUTURE VISIT INFORMATION      FUTURE VISIT INFORMATION:    Date: 4/13/2023    Time: 11am    [place] [facility]  [place]ERRAL INFORMATION:    Referring provider:  Dr. Harvey     Referring providers clinic:  [facility]     Reason for visit/diagnosis  Headaches     RECORDS REQUESTED FROM:       Clinic name Comments Records Status Imaging Status   Internal Dr. Harvey-11/21/2022 Epic No Images

## 2023-02-28 DIAGNOSIS — G43.709 CHRONIC MIGRAINE WITHOUT AURA WITHOUT STATUS MIGRAINOSUS, NOT INTRACTABLE: ICD-10-CM

## 2023-03-02 RX ORDER — AMITRIPTYLINE HYDROCHLORIDE 10 MG/1
TABLET ORAL
Qty: 0.1 TABLET | Refills: 0 | OUTPATIENT
Start: 2023-03-02

## 2023-04-13 ENCOUNTER — PRE VISIT (OUTPATIENT)
Dept: NEUROLOGY | Facility: CLINIC | Age: 33
End: 2023-04-13

## 2023-05-09 ENCOUNTER — VIRTUAL VISIT (OUTPATIENT)
Dept: FAMILY MEDICINE | Facility: CLINIC | Age: 33
End: 2023-05-09
Payer: COMMERCIAL

## 2023-05-09 DIAGNOSIS — G43.709 CHRONIC MIGRAINE WITHOUT AURA WITHOUT STATUS MIGRAINOSUS, NOT INTRACTABLE: Primary | ICD-10-CM

## 2023-05-09 PROCEDURE — 99214 OFFICE O/P EST MOD 30 MIN: CPT | Mod: VID | Performed by: NURSE PRACTITIONER

## 2023-05-09 RX ORDER — TOPIRAMATE 25 MG/1
TABLET, FILM COATED ORAL
Qty: 49 TABLET | Refills: 0 | Status: SHIPPED | OUTPATIENT
Start: 2023-05-09 | End: 2023-07-12

## 2023-05-09 RX ORDER — TOPIRAMATE 50 MG/1
50 TABLET, FILM COATED ORAL 2 TIMES DAILY
Qty: 60 TABLET | Refills: 2 | Status: SHIPPED | OUTPATIENT
Start: 2023-05-09 | End: 2023-08-15

## 2023-05-09 NOTE — PROGRESS NOTES
"Meredith is a 32 year old who is being evaluated via a billable video visit.      How would you like to obtain your AVS? MyChart  If the video visit is dropped, the invitation should be resent by: Text to cell phone: [phone]  Will anyone else be joining your video visit? No        Assessment & Plan     (G43.709) Chronic migraine without aura without status migrainosus, not intractable  (primary encounter diagnosis)  Comment:   Plan: topiramate (TOPAMAX) 25 MG tablet, topiramate         (TOPAMAX) 50 MG tablet, Adult Allergy/Asthma         Referral        Will start Topamax.  Discussed the use and indication of this medication as well as potential side effects.  Check bmp in 6 months, continue to get regular eye exams.  Will refer for allergy testing per her request.  Once she moves, she is planning on trying to work on an elimination diet.   Follow up via e-visit in one month.  Can stop Amitriptyline at that time.   Follow up with neurology as scheduled in July.        31 minutes spent by me on the date of the encounter doing chart review, patient visit and documentation        BMI:   Estimated body mass index is 30.22 kg/m  as calculated from the following:    Height as of 9/14/22: 1.66 m (5' 5.35\").    Weight as of 9/14/22: 83.3 kg (183 lb 9.6 oz).           Didi Harvey NP  [facility]    Payal Harper is a 32 year old, presenting for the following health issues:  Headache        5/9/2023     4:21 PM   Additional Questions   Roomed by bon collins   Accompanied by self     HPI   She is rescheduled for July with neurology.  She is getting a migraine at least 50% of the time, sometimes as often as 4 days a week.  She can wake up with headaches.  She denies any TMJ symptoms.   She can have some nausea with her migraines.  Headaches are typically left-sided and behind left eye.   She is currently taking 10 mg of Amitriptyline and is not sure it has been helpful.  She is cutting back " "on caffeine and is trying a low gluten diet.  She thinks that gluten may be a trigger.      She had an eye exam 2 weeks ago.   She wonders about getting allergy testing done.               Review of Systems         Objective    Vitals - Patient Reported  Weight (Patient Reported): 83.9 kg (185 lb)  Height (Patient Reported): 170.2 cm (5' 7\")  BMI (Based on Pt Reported Ht/Wt): 28.97  Pain Score: Severe Pain (7)  Pain Loc:  (headches)        Physical Exam   GENERAL: Healthy, alert and no distress  EYES: Eyes grossly normal to inspection.  No discharge or erythema, or obvious scleral/conjunctival abnormalities.  RESP: No audible wheeze, cough, or visible cyanosis.  No visible retractions or increased work of breathing.    SKIN: Visible skin clear. No significant rash, abnormal pigmentation or lesions.  NEURO: Cranial nerves grossly intact.  Mentation and speech appropriate for age.  PSYCH: Mentation appears normal, affect normal/bright, judgement and insight intact, normal speech and appearance well-groomed.                Video-Visit Details    Type of service:  Video Visit     Originating Location (pt. Location): Home  Distant Location (provider location):  On-site  Platform used for Video Visit: [employer]    "

## 2023-06-01 DIAGNOSIS — G43.709 CHRONIC MIGRAINE WITHOUT AURA WITHOUT STATUS MIGRAINOSUS, NOT INTRACTABLE: ICD-10-CM

## 2023-06-02 RX ORDER — TOPIRAMATE 50 MG/1
TABLET, FILM COATED ORAL
Qty: 60 TABLET | Refills: 2 | OUTPATIENT
Start: 2023-06-02

## 2023-06-02 NOTE — TELEPHONE ENCOUNTER
Refusing. Refills on file. Ordered 5/9/2023. 30 day supply with 2 refills.     CAMILA ThomasonN FRANK  [facility]

## 2023-07-04 DIAGNOSIS — G43.709 CHRONIC MIGRAINE WITHOUT AURA WITHOUT STATUS MIGRAINOSUS, NOT INTRACTABLE: ICD-10-CM

## 2023-07-06 RX ORDER — TOPIRAMATE 50 MG/1
TABLET, FILM COATED ORAL
Qty: 60 TABLET | Refills: 2 | OUTPATIENT
Start: 2023-07-06

## 2023-07-11 ENCOUNTER — TELEPHONE (OUTPATIENT)
Dept: NEUROLOGY | Facility: CLINIC | Age: 33
End: 2023-07-11
Payer: COMMERCIAL

## 2023-07-12 ENCOUNTER — VIRTUAL VISIT (OUTPATIENT)
Dept: NEUROLOGY | Facility: CLINIC | Age: 33
End: 2023-07-12
Payer: COMMERCIAL

## 2023-07-12 DIAGNOSIS — G43.709 CHRONIC MIGRAINE WITHOUT AURA WITHOUT STATUS MIGRAINOSUS, NOT INTRACTABLE: Primary | ICD-10-CM

## 2023-07-12 PROCEDURE — 99205 OFFICE O/P NEW HI 60 MIN: CPT | Mod: 95

## 2023-07-12 RX ORDER — RIZATRIPTAN BENZOATE 5 MG/1
5 TABLET ORAL
Qty: 18 TABLET | Refills: 3 | Status: SHIPPED | OUTPATIENT
Start: 2023-07-12

## 2023-07-12 ASSESSMENT — HEADACHE IMPACT TEST (HIT 6)
HIT6 TOTAL SCORE: 64
WHEN YOU HAVE HEADACHES HOW OFTEN IS THE PAIN SEVERE: VERY OFTEN
HOW OFTEN HAVE YOU FELT TOO TIRED TO WORK BECAUSE OF YOUR HEADACHES: SOMETIMES
HOW OFTEN HAVE YOU FELT FED UP OR IRRITATED BECAUSE OF YOUR HEADACHES: VERY OFTEN
WHEN YOU HAVE A HEADACHE HOW OFTEN DO YOU WISH YOU COULD LIE DOWN: VERY OFTEN
HOW OFTEN DO HEADACHES LIMIT YOUR DAILY ACTIVITIES: SOMETIMES
HOW OFTEN DID HEADACHS LIMIT CONCENTRATION ON WORK OR DAILY ACTIVITY: VERY OFTEN

## 2023-07-12 NOTE — NURSING NOTE
Is the patient currently in the state of [place]? YES    Visit mode:VIDEO    If the visit is dropped, the patient can be reconnected by: TELEPHONE VISIT: Phone number: [phone]    Will anyone else be joining the visit? NO      How would you like to obtain your AVS? MyChart    Are changes needed to the allergy or medication list? NO    Reason for visit: Video Visit (Headache )

## 2023-07-12 NOTE — LETTER
7/12/2023         RE: Meredith Mello  [address] [place] [address]        Dear Colleague,    Thank you for referring your patient, Meredith Mello, to the [facility]UR[facility]. Please see a copy of my visit note below.    Virtual Visit Details    Type of service:  Video Visit     Originating Location (pt. Location): Home    Distant Location (provider location):  On-site  Platform used for Video Visit: [facility]   Headache Neurology Consult    July 12, 2023     Meredith Mello MRN# [id number]   YOB: 1990 Age: 33 year old     Referring provider: Didi Harvey          Assessment and Recommendations:     Meredith Mello is a 33 year old female who presents for further evaluation of headache.    Her headache presentation meets criteria for chronic migraine without aura.  I suspect she has a genetic basis.    Her virtual neurologic examination is intact today.  She had brain imaging several years ago which was reportedly normal, although I do not have this available for review today.  I do not recommend further evaluation for secondary cause of headache today.  However, should her headaches develop new features or be refractory to treatment as anticipated, obtaining updated brain imaging would be recommended.    We discussed the following treatment strategy:  - For acute treatment of mild headache, she may use ibuprofen as needed, not to exceed 14 days per month to avoid medication overuse.   - For acute treatment of moderate to severe headache, she may try rizatriptan 5mg to 10 mg taken at onset of headache with a repeat dose taken after 2 hours if needed. Use should not exceed 9 days per month to avoid medication overuse.  Side effects reviewed.  - She declines needing an antiemetic today.    Her current frequency and severity of headaches warrant prevention.  - She has been using topiramate 50 mg twice daily but is  experiencing bothersome side effects of paresthesias, fatigue, word finding difficulty.  I recommend decreasing her dose down to 50 mg at bedtime.  She can either continue with this dose, or discontinue medication.  - I recommend increasing amitriptyline dose up to 25 mg at bedtime.  Side effects reviewed.  Discussed that this dose could be further increased up to 50 mg at bedtime if needed and if tolerated.  - If amitriptyline and/or topiramate are not effective enough for headache prevention, could consider additional headache prophylaxis including propranolol, verapamil, CGRP inhibitors.  - She may also try magnesium and/or riboflavin supplementation if desired.  I recommend 400 mg of each daily.  - Reviewed recommended caffeine intake.  I recommend no more than 200 mg of caffeine daily, though advised that should she wish to decrease caffeine intake that this should be done gradually to avoid worsening of headaches.    I will follow-up with her in 3 months to monitor her progress.    I spent 60 minutes today on patient care, chart review, and documentation.    Milady Bowen PA-C  Headache Neurology  [facility]            Chief Complaint:     Chief Complaint   Patient presents with     Video Visit     Headache            History is obtained from the patient and medical record. Patient was seen via virtual visit.       Meredith Mello is a 33 year old female who presents for further evaluation of headache.     She has been having headaches since childhood. Since November 2022, headaches have increased in frequency and severity.     She can wake up with headache for the past year or more. Headaches are located behind her left eye but can radiate towards the left side of her head with a pressure around her ear or left-sided tooth pain. Headaches are sharp pulsating pain lasting 1-3 days in duration. Headaches can reach up to an 8-9/10 in severity.     She has associated nausea, photophobia,  osmophobia.     Before starting preventative treatment, she would experience 15+ headache days per month with 4-8 severe headache days per month.     Headaches currently are during 9-12/30 days per month and are more manageable, reaching a 6/10 in severity.     She denies typical aura, visual disturbance, focal paresthesias or weakness, unilateral autonomic features, positional component, fevers or illness.     She is currently using amitriptyline 10 mg for headache prevention. This was helpful for the first month but then lost effect. She tolerated well but did not ever try higher doses.  She is currently using topiramate for the past 2 months. She thinks this has decreased the frequency of her headaches. She is taking 50 mg BID and feels very fatigued and has paresthesias in hands and tongue. She also notes word-finding difficulty.     For acute headache management, she uses 800 mg ibuprofen which can have variable effectiveness.   She tried sumatriptan several years ago but neck tightness was bothersome.    She is not aware of any clear headache triggers. She completed allergen testing which was unrevealing. She has tried gluten-free diet which did not have any effect on headache but she is looking into other potential food triggers. Alcohol can be a headache trigger.    She keeps caffeine intake regular as this is helpful for her headaches. She consumes about 450 mg caffeine daily.     She denies issues with sleep. She might talk in her sleep but denies snoring. Headaches do not wake her from sleep.    Her mother also experiences migraines.     She is up to date on her eye exams, no concerns.    Current headache treatments:  Acute therapies:  - Ibuprofen    Preventative therapies:  - Amitriptyline   - Topiramate     Supportive therapies:      Previous treatments tried:  Acute therapies:  - Naproxen  - Ibuprofen  - Sumatriptan - neck tightness    Preventative therapies:  - Bupropion (anxiety) - felt it made  headaches worse   - Buspirone (anxiety) - felt it made headaches worse  - Escitalopram (anxiety) - felt it made headaches worse    Supportive therapies:            Past Medical History:     Past Medical History:   Diagnosis Date     SHERRI (generalized anxiety disorder)       No history of heart disease, hypertension, asthma, kidney stones          Past Surgical History:     Past Surgical History:   Procedure Laterality Date     NO HISTORY OF SURGERY       TONSILLECTOMY Bilateral 6/12/2018    Procedure: TONSILLECTOMY;  Bilateral tonsillectomy;  Surgeon: Alex Valentino MD;  Location:  OR   Dental surgery          Social History:   She is engaged, no children.   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    Social History     Socioeconomic History     Marital status: Single     Spouse name: Not on file     Number of children: 0     Years of education: 16     Highest education level: Not on file   Occupational History     Occupation: music therapy      Employer: [employer]   Tobacco Use     Smoking status: Never     Smokeless tobacco: Never   Vaping Use     Vaping Use: Never used   Substance and Sexual Activity     Alcohol use: Yes     Comment: occasional     Drug use: Yes     Types: Marijuana     Sexual activity: Yes     Partners: Male     Birth control/protection: I.U.D.   Other Topics Concern     Parent/sibling w/ CABG, MI or angioplasty before 65F 55M? Not Asked   Social History Narrative     Not on file     Social Determinants of Health     Financial Resource Strain: Not on file   Food Insecurity: Not on file   Transportation Needs: Not on file   Physical Activity: Not on file   Stress: Not on file   Social Connections: Not on file   Intimate Partner Violence: Not on file   Housing Stability: Not on file             Family History:     Family History   Problem Relation Age of Onset     Hypertension Father      Alcoholism Father      Hyperlipidemia Maternal Grandmother      Coronary Artery Disease No  family hx of      Diabetes No family hx of              Allergies:    No Known Allergies          Medications:     Current Outpatient Medications:      amitriptyline (ELAVIL) 10 MG tablet, Take 1 tablet (10 mg) by mouth At Bedtime, Disp: 30 tablet, Rfl: 8     levonorgestrel (MIRENA) 20 MCG/24HR IUD, 1 each (20 mcg) by Intrauterine route once, Disp:  , Rfl:      topiramate (TOPAMAX) 50 MG tablet, Take 1 tablet (50 mg) by mouth 2 times daily, Disp: 60 tablet, Rfl: 2     triamcinolone (KENALOG) 0.5 % external ointment, Apply to affected area twice daily for up to 3 weeks, Disp: 15 g, Rfl: 0     topiramate (TOPAMAX) 25 MG tablet, Take 1 tablet (25 mg) by mouth At Bedtime for 7 days, THEN 1 tablet (25 mg) 2 times daily for 7 days, THEN 2 tablets (50 mg) 2 times daily for 7 days., Disp: 49 tablet, Rfl: 0    Current Facility-Administered Medications:      levonorgestrel (MIRENA) 20 MCG/24HR IUD 20 mcg, 1 each, Intrauterine, Once, Aida Torres APRN CNM          Physical Exam:   There were no vitals taken for this visit.     General: In no acute distress.  Neck: Normal range of motion with lateral head movements and neck flexion.  Eyes: No conjunctival injection, no scleral icterus.     Neurologic Exam:  Mental Status Exam: Alert, awake and oriented to situation. No dysarthria. Speech of normal fluency.  Cranial Nerves: EOMs intact, facial movements symmetric, hearing intact to conversation, tongue midline and fully mobile. No tongue atrophy or fasciculations.    Motor: No drift in upper extremities. Able to stand from a seated position without use of arms. No tremors or abnormal movements noted.  Coordination: With arms outstretched, able to touch nose using index finger accurately bilaterally. Normal finger tapping bilaterally.    Gait: Normal stance and gait.         Data:     Had some head imaging in 2011, reportedly normal per patient - no record available           Again, thank you for allowing me to participate  in the care of your patient.        Sincerely,        TAYLOR PELAEZ PA-C

## 2023-07-12 NOTE — PATIENT INSTRUCTIONS
For headache prevention:  - Decrease topiramate to 50 mg at bedtime for 1 to 2 weeks.  Depending on how you are tolerating this, you can decide to stay at this dose or you can discontinue.  - Increase amitriptyline to 25 mg nightly.  If this is making you too drowsy in the morning, you can try taking it earlier in the evening, such as at dinnertime.  If you experience side effects at this dose, let me know.  - If these medications are not helpful enough for your headaches, other options include blood pressure medications like propranolol or verapamil, as well as newer options for migraines such as Nurtec, Qulipta, Emgality, Aimovig, Ajovy.  - Magnesium and riboflavin (vitamin B2) supplementation may also be helpful for her headaches.  I recommend 400 mg of each daily.  These are available over-the-counter.    When you have a headache:  - Ibuprofen for mild headache.  You can use this up to 14 days/month.  - Rizatriptan 5 mg to 10 mg at onset of moderate to severe headache.  You can repeat dose after 2 hours if needed.  You can use this up to 9 days/month.

## 2023-07-12 NOTE — PROGRESS NOTES
Virtual Visit Details    Type of service:  Video Visit     Originating Location (pt. Location): Home    Distant Location (provider location):  On-site  Platform used for Video Visit: [facility]   Headache Neurology Consult    July 12, 2023     Meredith Mello MRN# [id number]   YOB: 1990 Age: 33 year old     Referring provider: Didi Harvey          Assessment and Recommendations:     Meredith Mello is a 33 year old female who presents for further evaluation of headache.    Her headache presentation meets criteria for chronic migraine without aura.  I suspect she has a genetic basis.    Her virtual neurologic examination is intact today.  She had brain imaging several years ago which was reportedly normal, although I do not have this available for review today.  I do not recommend further evaluation for secondary cause of headache today.  However, should her headaches develop new features or be refractory to treatment as anticipated, obtaining updated brain imaging would be recommended.    We discussed the following treatment strategy:  - For acute treatment of mild headache, she may use ibuprofen as needed, not to exceed 14 days per month to avoid medication overuse.   - For acute treatment of moderate to severe headache, she may try rizatriptan 5mg to 10 mg taken at onset of headache with a repeat dose taken after 2 hours if needed. Use should not exceed 9 days per month to avoid medication overuse.  Side effects reviewed.  - She declines needing an antiemetic today.    Her current frequency and severity of headaches warrant prevention.  - She has been using topiramate 50 mg twice daily but is experiencing bothersome side effects of paresthesias, fatigue, word finding difficulty.  I recommend decreasing her dose down to 50 mg at bedtime.  She can either continue with this dose, or discontinue medication.  - I recommend increasing amitriptyline dose up  to 25 mg at bedtime.  Side effects reviewed.  Discussed that this dose could be further increased up to 50 mg at bedtime if needed and if tolerated.  - If amitriptyline and/or topiramate are not effective enough for headache prevention, could consider additional headache prophylaxis including propranolol, verapamil, CGRP inhibitors.  - She may also try magnesium and/or riboflavin supplementation if desired.  I recommend 400 mg of each daily.  - Reviewed recommended caffeine intake.  I recommend no more than 200 mg of caffeine daily, though advised that should she wish to decrease caffeine intake that this should be done gradually to avoid worsening of headaches.    I will follow-up with her in 3 months to monitor her progress.    I spent 60 minutes today on patient care, chart review, and documentation.    Milady Bowen PA-C  Headache Neurology  [facility]            Chief Complaint:     Chief Complaint   Patient presents with     Video Visit     Headache            History is obtained from the patient and medical record. Patient was seen via virtual visit.       Meredith Mello is a 33 year old female who presents for further evaluation of headache.     She has been having headaches since childhood. Since November 2022, headaches have increased in frequency and severity.     She can wake up with headache for the past year or more. Headaches are located behind her left eye but can radiate towards the left side of her head with a pressure around her ear or left-sided tooth pain. Headaches are sharp pulsating pain lasting 1-3 days in duration. Headaches can reach up to an 8-9/10 in severity.     She has associated nausea, photophobia, osmophobia.     Before starting preventative treatment, she would experience 15+ headache days per month with 4-8 severe headache days per month.     Headaches currently are during 9-12/30 days per month and are more manageable, reaching a 6/10 in severity.      She denies typical aura, visual disturbance, focal paresthesias or weakness, unilateral autonomic features, positional component, fevers or illness.     She is currently using amitriptyline 10 mg for headache prevention. This was helpful for the first month but then lost effect. She tolerated well but did not ever try higher doses.  She is currently using topiramate for the past 2 months. She thinks this has decreased the frequency of her headaches. She is taking 50 mg BID and feels very fatigued and has paresthesias in hands and tongue. She also notes word-finding difficulty.     For acute headache management, she uses 800 mg ibuprofen which can have variable effectiveness.   She tried sumatriptan several years ago but neck tightness was bothersome.    She is not aware of any clear headache triggers. She completed allergen testing which was unrevealing. She has tried gluten-free diet which did not have any effect on headache but she is looking into other potential food triggers. Alcohol can be a headache trigger.    She keeps caffeine intake regular as this is helpful for her headaches. She consumes about 450 mg caffeine daily.     She denies issues with sleep. She might talk in her sleep but denies snoring. Headaches do not wake her from sleep.    Her mother also experiences migraines.     She is up to date on her eye exams, no concerns.    Current headache treatments:  Acute therapies:  - Ibuprofen    Preventative therapies:  - Amitriptyline   - Topiramate     Supportive therapies:      Previous treatments tried:  Acute therapies:  - Naproxen  - Ibuprofen  - Sumatriptan - neck tightness    Preventative therapies:  - Bupropion (anxiety) - felt it made headaches worse   - Buspirone (anxiety) - felt it made headaches worse  - Escitalopram (anxiety) - felt it made headaches worse    Supportive therapies:            Past Medical History:     Past Medical History:   Diagnosis Date     SHERRI (generalized anxiety  disorder)       No history of heart disease, hypertension, asthma, kidney stones          Past Surgical History:     Past Surgical History:   Procedure Laterality Date     NO HISTORY OF SURGERY       TONSILLECTOMY Bilateral 6/12/2018    Procedure: TONSILLECTOMY;  Bilateral tonsillectomy;  Surgeon: Alex Valentino MD;  Location:  OR   Dental surgery          Social History:   She is engaged, no children.   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    Social History     Socioeconomic History     Marital status: Single     Spouse name: Not on file     Number of children: 0     Years of education: 16     Highest education level: Not on file   Occupational History     Occupation: music therapy      Employer: [employer]   Tobacco Use     Smoking status: Never     Smokeless tobacco: Never   Vaping Use     Vaping Use: Never used   Substance and Sexual Activity     Alcohol use: Yes     Comment: occasional     Drug use: Yes     Types: Marijuana     Sexual activity: Yes     Partners: Male     Birth control/protection: I.U.D.   Other Topics Concern     Parent/sibling w/ CABG, MI or angioplasty before 65F 55M? Not Asked   Social History Narrative     Not on file     Social Determinants of Health     Financial Resource Strain: Not on file   Food Insecurity: Not on file   Transportation Needs: Not on file   Physical Activity: Not on file   Stress: Not on file   Social Connections: Not on file   Intimate Partner Violence: Not on file   Housing Stability: Not on file             Family History:     Family History   Problem Relation Age of Onset     Hypertension Father      Alcoholism Father      Hyperlipidemia Maternal Grandmother      Coronary Artery Disease No family hx of      Diabetes No family hx of              Allergies:    No Known Allergies          Medications:     Current Outpatient Medications:      amitriptyline (ELAVIL) 10 MG tablet, Take 1 tablet (10 mg) by mouth At Bedtime, Disp: 30 tablet, Rfl: 8      levonorgestrel (MIRENA) 20 MCG/24HR IUD, 1 each (20 mcg) by Intrauterine route once, Disp:  , Rfl:      topiramate (TOPAMAX) 50 MG tablet, Take 1 tablet (50 mg) by mouth 2 times daily, Disp: 60 tablet, Rfl: 2     triamcinolone (KENALOG) 0.5 % external ointment, Apply to affected area twice daily for up to 3 weeks, Disp: 15 g, Rfl: 0     topiramate (TOPAMAX) 25 MG tablet, Take 1 tablet (25 mg) by mouth At Bedtime for 7 days, THEN 1 tablet (25 mg) 2 times daily for 7 days, THEN 2 tablets (50 mg) 2 times daily for 7 days., Disp: 49 tablet, Rfl: 0    Current Facility-Administered Medications:      levonorgestrel (MIRENA) 20 MCG/24HR IUD 20 mcg, 1 each, Intrauterine, Once, Aida Torres APRN CNM          Physical Exam:   There were no vitals taken for this visit.     General: In no acute distress.  Neck: Normal range of motion with lateral head movements and neck flexion.  Eyes: No conjunctival injection, no scleral icterus.     Neurologic Exam:  Mental Status Exam: Alert, awake and oriented to situation. No dysarthria. Speech of normal fluency.  Cranial Nerves: EOMs intact, facial movements symmetric, hearing intact to conversation, tongue midline and fully mobile. No tongue atrophy or fasciculations.    Motor: No drift in upper extremities. Able to stand from a seated position without use of arms. No tremors or abnormal movements noted.  Coordination: With arms outstretched, able to touch nose using index finger accurately bilaterally. Normal finger tapping bilaterally.    Gait: Normal stance and gait.         Data:     Had some head imaging in 2011, reportedly normal per patient - no record available

## 2023-08-07 DIAGNOSIS — G43.709 CHRONIC MIGRAINE WITHOUT AURA WITHOUT STATUS MIGRAINOSUS, NOT INTRACTABLE: ICD-10-CM

## 2023-08-14 DIAGNOSIS — G43.709 CHRONIC MIGRAINE WITHOUT AURA WITHOUT STATUS MIGRAINOSUS, NOT INTRACTABLE: ICD-10-CM

## 2023-08-15 ENCOUNTER — PATIENT OUTREACH (OUTPATIENT)
Dept: CARE COORDINATION | Facility: CLINIC | Age: 33
End: 2023-08-15
Payer: COMMERCIAL

## 2023-08-15 RX ORDER — TOPIRAMATE 50 MG/1
50 TABLET, FILM COATED ORAL 2 TIMES DAILY
Qty: 60 TABLET | Refills: 2 | Status: SHIPPED | OUTPATIENT
Start: 2023-08-15 | End: 2023-10-09

## 2023-08-15 NOTE — TELEPHONE ENCOUNTER
Routing refill request to provider for review/approval because:  No CBC on file since 2018    Last visit-- 5/9/23 virtual with PCP.    CAMILA RahmanN RN  [facility]

## 2023-09-16 DIAGNOSIS — G43.709 CHRONIC MIGRAINE WITHOUT AURA WITHOUT STATUS MIGRAINOSUS, NOT INTRACTABLE: ICD-10-CM

## 2023-09-18 RX ORDER — TOPIRAMATE 50 MG/1
50 TABLET, FILM COATED ORAL 2 TIMES DAILY
Qty: 180 TABLET | OUTPATIENT
Start: 2023-09-18

## 2023-10-09 ENCOUNTER — OFFICE VISIT (OUTPATIENT)
Dept: FAMILY MEDICINE | Facility: CLINIC | Age: 33
End: 2023-10-09
Payer: COMMERCIAL

## 2023-10-09 VITALS
BODY MASS INDEX: 29.72 KG/M2 | RESPIRATION RATE: 16 BRPM | DIASTOLIC BLOOD PRESSURE: 74 MMHG | HEART RATE: 81 BPM | SYSTOLIC BLOOD PRESSURE: 112 MMHG | HEIGHT: 66 IN | WEIGHT: 184.9 LBS | TEMPERATURE: 99.2 F | OXYGEN SATURATION: 99 %

## 2023-10-09 DIAGNOSIS — F41.1 GAD (GENERALIZED ANXIETY DISORDER): ICD-10-CM

## 2023-10-09 DIAGNOSIS — Z00.00 ROUTINE GENERAL MEDICAL EXAMINATION AT A HEALTH CARE FACILITY: Primary | ICD-10-CM

## 2023-10-09 DIAGNOSIS — Z01.84 IMMUNITY STATUS TESTING: ICD-10-CM

## 2023-10-09 DIAGNOSIS — G43.709 CHRONIC MIGRAINE WITHOUT AURA WITHOUT STATUS MIGRAINOSUS, NOT INTRACTABLE: ICD-10-CM

## 2023-10-09 DIAGNOSIS — M54.42 LEFT-SIDED LOW BACK PAIN WITH LEFT-SIDED SCIATICA, UNSPECIFIED CHRONICITY: ICD-10-CM

## 2023-10-09 DIAGNOSIS — R41.840 DIFFICULTY CONCENTRATING: ICD-10-CM

## 2023-10-09 LAB
ALBUMIN SERPL BCG-MCNC: 4.7 G/DL (ref 3.5–5.2)
ALP SERPL-CCNC: 59 U/L (ref 35–104)
ALT SERPL W P-5'-P-CCNC: 9 U/L (ref 0–50)
ANION GAP SERPL CALCULATED.3IONS-SCNC: 11 MMOL/L (ref 7–15)
AST SERPL W P-5'-P-CCNC: 24 U/L (ref 0–45)
BILIRUB SERPL-MCNC: 0.3 MG/DL
BUN SERPL-MCNC: 10.8 MG/DL (ref 6–20)
CALCIUM SERPL-MCNC: 9.5 MG/DL (ref 8.6–10)
CHLORIDE SERPL-SCNC: 106 MMOL/L (ref 98–107)
CHOLEST SERPL-MCNC: 199 MG/DL
CREAT SERPL-MCNC: 0.79 MG/DL (ref 0.51–0.95)
DEPRECATED HCO3 PLAS-SCNC: 24 MMOL/L (ref 22–29)
EGFRCR SERPLBLD CKD-EPI 2021: >90 ML/MIN/1.73M2
ERYTHROCYTE [DISTWIDTH] IN BLOOD BY AUTOMATED COUNT: 13.2 % (ref 10–15)
GLUCOSE SERPL-MCNC: 88 MG/DL (ref 70–99)
HBV SURFACE AB SERPL IA-ACNC: 302.09 M[IU]/ML
HBV SURFACE AB SERPL IA-ACNC: REACTIVE M[IU]/ML
HCT VFR BLD AUTO: 40.5 % (ref 35–47)
HDLC SERPL-MCNC: 60 MG/DL
HGB BLD-MCNC: 13.2 G/DL (ref 11.7–15.7)
LDLC SERPL CALC-MCNC: 121 MG/DL
MCH RBC QN AUTO: 28.7 PG (ref 26.5–33)
MCHC RBC AUTO-ENTMCNC: 32.6 G/DL (ref 31.5–36.5)
MCV RBC AUTO: 88 FL (ref 78–100)
NONHDLC SERPL-MCNC: 139 MG/DL
PLATELET # BLD AUTO: 285 10E3/UL (ref 150–450)
POTASSIUM SERPL-SCNC: 4.8 MMOL/L (ref 3.4–5.3)
PROT SERPL-MCNC: 7.4 G/DL (ref 6.4–8.3)
RBC # BLD AUTO: 4.6 10E6/UL (ref 3.8–5.2)
SODIUM SERPL-SCNC: 141 MMOL/L (ref 135–145)
TRIGL SERPL-MCNC: 92 MG/DL
WBC # BLD AUTO: 5.8 10E3/UL (ref 4–11)

## 2023-10-09 PROCEDURE — 86706 HEP B SURFACE ANTIBODY: CPT | Performed by: NURSE PRACTITIONER

## 2023-10-09 PROCEDURE — 99214 OFFICE O/P EST MOD 30 MIN: CPT | Mod: 25 | Performed by: NURSE PRACTITIONER

## 2023-10-09 PROCEDURE — 80061 LIPID PANEL: CPT | Performed by: NURSE PRACTITIONER

## 2023-10-09 PROCEDURE — 36415 COLL VENOUS BLD VENIPUNCTURE: CPT | Performed by: NURSE PRACTITIONER

## 2023-10-09 PROCEDURE — 85027 COMPLETE CBC AUTOMATED: CPT | Performed by: NURSE PRACTITIONER

## 2023-10-09 PROCEDURE — 99395 PREV VISIT EST AGE 18-39: CPT | Mod: 25 | Performed by: NURSE PRACTITIONER

## 2023-10-09 PROCEDURE — 80053 COMPREHEN METABOLIC PANEL: CPT | Performed by: NURSE PRACTITIONER

## 2023-10-09 RX ORDER — ESCITALOPRAM OXALATE 10 MG/1
10 TABLET ORAL DAILY
Qty: 90 TABLET | Refills: 3 | Status: SHIPPED | OUTPATIENT
Start: 2023-10-09 | End: 2024-08-12

## 2023-10-09 RX ORDER — TOPIRAMATE 50 MG/1
50 TABLET, FILM COATED ORAL DAILY
Qty: 90 TABLET | Refills: 3 | Status: SHIPPED | OUTPATIENT
Start: 2023-10-09 | End: 2024-09-26

## 2023-10-09 ASSESSMENT — ENCOUNTER SYMPTOMS
SORE THROAT: 0
MYALGIAS: 0
DIZZINESS: 1
NERVOUS/ANXIOUS: 1
HEARTBURN: 0
BACK PAIN: 1
SHORTNESS OF BREATH: 0
NAUSEA: 0
HEMATOCHEZIA: 0
COUGH: 0
DIARRHEA: 0
PARESTHESIAS: 0
DYSURIA: 0
CONSTIPATION: 0
ARTHRALGIAS: 0
FREQUENCY: 0
WEAKNESS: 0
PALPITATIONS: 0
EYE PAIN: 0
BREAST MASS: 0
HEMATURIA: 0
CHILLS: 0
FEVER: 0
ABDOMINAL PAIN: 0
HEADACHES: 1
JOINT SWELLING: 0

## 2023-10-09 ASSESSMENT — PAIN SCALES - GENERAL: PAINLEVEL: MILD PAIN (3)

## 2023-10-09 NOTE — PROGRESS NOTES
SUBJECTIVE:   CC: Meredith is an 33 year old who presents for preventive health visit.     Patient presents with:  Physical  Headache: Ongoing management  A.D.H.D: Concerns for potential ADHD  Back Pain  Recheck Medication: Migraine med management and restarting lexapro        10/9/2023     9:21 AM   Additional Questions   Roomed by Mahendra MILLER RN       Healthy Habits:     Getting at least 3 servings of Calcium per day:  Yes    Bi-annual eye exam:  Yes    Dental care twice a year:  Yes    Sleep apnea or symptoms of sleep apnea:  None    Diet:  Regular (no restrictions)    Frequency of exercise:  2-3 days/week    Duration of exercise:  15-30 minutes    Taking medications regularly:  Yes    Medication side effects:  Lightheadedness    Additional concerns today:  Yes  Headache   Pertinent negatives include no fever, no palpitations, no shortness of breath and no nausea.   A.D.H.D  Associated symptoms include headaches. Pertinent negatives include no abdominal pain, arthralgias, chest pain, chills, congestion, coughing, fever, joint swelling, myalgias, nausea, rash, sore throat or weakness.   Back Pain   Associated symptoms include headaches. Pertinent negatives include no chest pain, no fever, no abdominal pain, no dysuria, no pelvic pain, no paresthesias and no weakness.     She did see neurology this summer for her chronic migraines.   Adjustment in the amitriptyline dose has helped decrease the frequency of her migraines.  She can only tolerate Topamax once daily.  Interested in Botox. Rizatriptan is not helpful.  Sometimes she wakes up with a migraine and she isn't able to catch it right away.    She is interested in testing for ADHD.    She has been having low back pain and left hip and leg pain for the past few months.  She talked to PT at her work and has been doing some exercises, but symptoms are worsening.     She stopped Lexapro because she thought it was causing migraines, but they did not improve, so she would  like to restart for her anxiety.                 Social History     Tobacco Use    Smoking status: Never     Passive exposure: Never    Smokeless tobacco: Never   Substance Use Topics    Alcohol use: Yes     Comment: occasional             10/9/2023     9:10 AM   Alcohol Use   Prescreen: >3 drinks/day or >7 drinks/week? Not Applicable     Reviewed orders with patient.  Reviewed health maintenance and updated orders accordingly - Yes      Breast Cancer Screenin/7/2022    10:19 AM   Breast CA Risk Assessment (FHS-7)   Do you have a family history of breast, colon, or ovarian cancer? No / Unknown           Pertinent mammograms are reviewed under the imaging tab.    History of abnormal Pap smear: NO - age 30- 65 PAP every 3 years recommended      Latest Ref Rng & Units 2022    12:27 PM   PAP / HPV   PAP  Negative for Intraepithelial Lesion or Malignancy (NILM)    HPV 16 DNA Negative Negative    HPV 18 DNA Negative Negative    Other HR HPV Negative Negative      Reviewed and updated as needed this visit by clinical staff   Tobacco  Allergies  Meds              Reviewed and updated as needed this visit by Provider                     Review of Systems   Constitutional:  Negative for chills and fever.   HENT:  Negative for congestion, ear pain, hearing loss and sore throat.    Eyes:  Negative for pain and visual disturbance.   Respiratory:  Negative for cough and shortness of breath.    Cardiovascular:  Negative for chest pain, palpitations and peripheral edema.   Gastrointestinal:  Negative for abdominal pain, constipation, diarrhea, heartburn, hematochezia and nausea.   Breasts:  Negative for tenderness, breast mass and discharge.   Genitourinary:  Negative for dysuria, frequency, genital sores, hematuria, pelvic pain, urgency, vaginal bleeding and vaginal discharge.   Musculoskeletal:  Positive for back pain. Negative for arthralgias, joint swelling and myalgias.   Skin:  Negative for rash.  "  Neurological:  Positive for dizziness and headaches. Negative for weakness and paresthesias.   Psychiatric/Behavioral:  Negative for mood changes. The patient is nervous/anxious.           OBJECTIVE:   /74 (BP Location: Right arm, Patient Position: Sitting, Cuff Size: Adult Regular)   Pulse 81   Temp 99.2  F (37.3  C) (Temporal)   Resp 16   Ht 1.664 m (5' 5.5\")   Wt 83.9 kg (184 lb 14.4 oz)   LMP  (LMP Unknown)   SpO2 99%   BMI 30.30 kg/m    Physical Exam  GENERAL: healthy, alert and no distress  EYES: Eyes grossly normal to inspection, PERRL and conjunctivae and sclerae normal  HENT: ear canals and TM's normal, nose and mouth without ulcers or lesions  NECK: no adenopathy, no asymmetry, masses, or scars and thyroid normal to palpation  RESP: lungs clear to auscultation - no rales, rhonchi or wheezes  CV: regular rate and rhythm, normal S1 S2, no S3 or S4, no murmur, click or rub, no peripheral edema and peripheral pulses strong  ABDOMEN: soft, nontender, no hepatosplenomegaly, no masses and bowel sounds normal  MS: no gross musculoskeletal defects noted, no edema  SKIN: no suspicious lesions or rashes  NEURO: Normal strength and tone, mentation intact and speech normal  PSYCH: mentation appears normal, affect normal/bright        ASSESSMENT/PLAN:   (Z00.00) Routine general medical examination at a health care facility  (primary encounter diagnosis)  Comment:   Plan: Lipid panel reflex to direct LDL Fasting,         Comprehensive metabolic panel (BMP + Alb, Alk         Phos, ALT, AST, Total. Bili, TP), CBC with         platelets            (G43.709) Chronic migraine without aura without status migrainosus, not intractable  Comment: not controlled  Plan: rimegepant (NURTEC) 75 MG ODT tablet,         amitriptyline (ELAVIL) 25 MG tablet, topiramate        (TOPAMAX) 50 MG tablet, Comprehensive metabolic        panel (BMP + Alb, Alk Phos, ALT, AST, Total.         Bili, TP), CBC with platelets, Adult " "Physical         Medicine and Rehab  Referral        Will try Nurtec, as she should qualify since she has had a year of two preventive medications and failed on rizatriptan.  Will also refer to physiatry for consult on Botox injections.      (Z01.84) Immunity status testing  Comment:   Plan: Hepatitis B Surface Antibody            (R41.840) Difficulty concentrating  Comment:   Plan: Adult Mental Health  Referral        Will refer for ADHD evaluation.      (M54.42) Left-sided low back pain with left-sided sciatica, unspecified chronicity  Comment:   Plan: Physical Therapy Referral        Referral to PT given.      (F41.1) SHERRI (generalized anxiety disorder)  Comment: not controlled  Plan: escitalopram (LEXAPRO) 10 MG tablet        Will restart Lexapro.  Follow up in 6 weeks if symptoms are not improved.            COUNSELING:  Reviewed preventive health counseling, as reflected in patient instructions      BMI:   Estimated body mass index is 30.3 kg/m  as calculated from the following:    Height as of this encounter: 1.664 m (5' 5.5\").    Weight as of this encounter: 83.9 kg (184 lb 14.4 oz).         She reports that she has never smoked. She has never been exposed to tobacco smoke. She has never used smokeless tobacco.          Didi Harvey NP  [facility]  "

## 2023-10-30 NOTE — PROGRESS NOTES
PHYSICAL THERAPY EVALUATION  Type of Visit: Evaluation    See electronic medical record for Abuse and Falls Screening details.    Subjective       Presenting condition or subjective complaint: Lower back/hip pain that radiates down my left leg to my knee  Date of onset: 04/01/23    Relevant medical history: Migraines or headaches   Dates & types of surgery: Tonsillectomy in 2018  Prior therapy history for the same diagnosis, illness or injury: No      Living Environment  Social support: With a significant other or spouse   Type of home: House; 2-story; Basement   Stairs to enter the home: Yes 3 Is there a railing: Yes   Ramp: No   Stairs inside the home: Yes 12 Is there a railing: Yes   Help at home: None  Equipment owned:       Employment: Yes Music Therapist  Hobbies/Interests: Music, reading, walking dogs, cooking, gardening    Patient goals for therapy: Move my body without pain    Meredith Mello is a 33 year old female with a lumbar condition. Mechanism of injury: Unknown cause. Where: (home, work, MVA, community, recreation/sport, unknown, other): NA. Location of symptoms: left buttock, left lateral and anterior thigh. Pain level on number scale: 2-6/10. Quality of pain: sharp. Associated symptoms: denies numbness and tingling, denies bowel and bladder dysfunction. Pain frequency (constant/intermittent): intermittent. Symptoms are exacerbated by: lying supine, sleeping, sometimes walking, lifting, carrying, bending, dressing, washing. Symptoms are relieved by: changing position. Progression of symptoms since onset (same/better/worse): worse. Special tests (x-ray, MRI, CT scan, EMG, bone scan): None. Previous treatment: same. Improvement with previous treatment: PT from co-worker. General health as reported by patient is good. Pertinent medical history includes:  see Epic. Medical allergies includes: see Epic. Surgical history includes: see Epic. Current medications include: see Epic. 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 Patient is (working in normal job without restrictions/working in normal job with restrictions/working in an alternate job/not working due to present treatment problem): working normal job. Primary job tasks: playing music - guitar, standing, sitting. Barriers at home/work: None reported by patient. Red flags: None reported by patient.     Objective   Posture    Sitting (good/fair/poor): poor  Standing (good/fair/poor):  fair  Lordosis (red/acc/normal):  normal  Lateral shift (right/left/nil):  nil  Relevant lateral shift (yes/no):  no  Correction of posture (better/worse/no effect): better    Neurological    Myotomes L R   L1-2 (hip flexion) 5/5 5/5   L3 (knee extension) 5/5 5/5   L4 (ankle DF) 5/5 5/5   L5 (g. toe ext) 5/5 5/5   S1 (ankle PF or knee flex) 5/5 5/5     Sensory Deficit, Reflexes: lumbar dermatomes WNL    Lumbar Movement Loss Rohan Mod Min Nil Pain   Flexion    x +left buttock on return to neutral   Extension   x  +left buttock   Side Gliding L   x  +left buttock   Side Gliding R    x      Assessment & Plan   CLINICAL IMPRESSIONS  Medical Diagnosis:      Treatment Diagnosis: Chronic left-sided low back pain with left-sided sciatica   Impression/Assessment: Patient is a 33 year old female with lumbar complaints.  The following significant findings have been identified: Pain, Decreased ROM/flexibility, Decreased joint mobility, Decreased strength, Decreased activity tolerance, and Impaired posture. These impairments interfere with their ability to perform self care tasks, work tasks, recreational activities, household chores, driving , household mobility, and community mobility as compared to previous level of function.     Clinical Decision Making (Complexity):  Clinical Presentation: Stable/Uncomplicated  Clinical Presentation Rationale: based on medical and personal factors listed in PT evaluation  Clinical Decision Making (Complexity): Low complexity    PLAN OF CARE  Treatment  Interventions:  Interventions: Manual Therapy, Neuromuscular Re-education, Therapeutic Activity, Therapeutic Exercise, Self-Care/Home Management    Long Term Goals     PT Goal 1  Goal Description: Patient will be able to transfer in<>out of bed with 0/10 pain.  Rationale: to maximize safety and independence within the home  Target Date: 01/24/24      Frequency of Treatment: 1x/week  Duration of Treatment: for 4 weeks tapering down to 2x/month for 8 weeks    Recommended Referrals to Other Professionals:  None  Education Assessment:   Learner/Method: Patient;No Barriers to Learning    Risks and benefits of evaluation/treatment have been explained.   Patient/Family/caregiver agrees with Plan of Care.     Evaluation Time:     PT Eval, Low Complexity Minutes (74275): 15  Signing Clinician: Joes Luis Lyle PT

## 2023-11-01 ENCOUNTER — THERAPY VISIT (OUTPATIENT)
Dept: PHYSICAL THERAPY | Facility: CLINIC | Age: 33
End: 2023-11-01
Attending: NURSE PRACTITIONER
Payer: COMMERCIAL

## 2023-11-01 DIAGNOSIS — G89.29 CHRONIC LEFT-SIDED LOW BACK PAIN WITH LEFT-SIDED SCIATICA: Primary | ICD-10-CM

## 2023-11-01 DIAGNOSIS — M54.42 LEFT-SIDED LOW BACK PAIN WITH LEFT-SIDED SCIATICA, UNSPECIFIED CHRONICITY: ICD-10-CM

## 2023-11-01 DIAGNOSIS — M54.42 CHRONIC LEFT-SIDED LOW BACK PAIN WITH LEFT-SIDED SCIATICA: Primary | ICD-10-CM

## 2023-11-01 PROCEDURE — 97161 PT EVAL LOW COMPLEX 20 MIN: CPT | Mod: GP | Performed by: PHYSICAL THERAPIST

## 2023-11-01 PROCEDURE — 97110 THERAPEUTIC EXERCISES: CPT | Mod: 59 | Performed by: PHYSICAL THERAPIST

## 2023-11-01 PROCEDURE — 97530 THERAPEUTIC ACTIVITIES: CPT | Mod: GP | Performed by: PHYSICAL THERAPIST

## 2023-11-08 ENCOUNTER — THERAPY VISIT (OUTPATIENT)
Dept: PHYSICAL THERAPY | Facility: CLINIC | Age: 33
End: 2023-11-08
Attending: NURSE PRACTITIONER
Payer: COMMERCIAL

## 2023-11-08 DIAGNOSIS — M54.42 CHRONIC LEFT-SIDED LOW BACK PAIN WITH LEFT-SIDED SCIATICA: Primary | ICD-10-CM

## 2023-11-08 DIAGNOSIS — G89.29 CHRONIC LEFT-SIDED LOW BACK PAIN WITH LEFT-SIDED SCIATICA: Primary | ICD-10-CM

## 2023-11-08 PROCEDURE — 97110 THERAPEUTIC EXERCISES: CPT | Mod: GP | Performed by: PHYSICAL THERAPIST

## 2023-11-08 PROCEDURE — 97530 THERAPEUTIC ACTIVITIES: CPT | Mod: GP | Performed by: PHYSICAL THERAPIST

## 2023-12-26 ENCOUNTER — THERAPY VISIT (OUTPATIENT)
Dept: PHYSICAL THERAPY | Facility: CLINIC | Age: 33
End: 2023-12-26
Payer: COMMERCIAL

## 2023-12-26 DIAGNOSIS — G89.29 CHRONIC LEFT-SIDED LOW BACK PAIN WITH LEFT-SIDED SCIATICA: Primary | ICD-10-CM

## 2023-12-26 DIAGNOSIS — M54.42 CHRONIC LEFT-SIDED LOW BACK PAIN WITH LEFT-SIDED SCIATICA: Primary | ICD-10-CM

## 2023-12-26 PROCEDURE — 97110 THERAPEUTIC EXERCISES: CPT | Mod: GP | Performed by: PHYSICAL THERAPIST

## 2023-12-26 PROCEDURE — 97112 NEUROMUSCULAR REEDUCATION: CPT | Mod: GP | Performed by: PHYSICAL THERAPIST

## 2024-01-16 NOTE — PROGRESS NOTES
[facility]     Progress Note - Initial Visit    Client Name:  Lori Mello Date: 2024         Service Type: Individual     Visit Start Time: 12:30 PM  Visit End Time: 1:25 PM    Visit Time: 55 minutes    Visit #: 1    Attendees: Client attended alone    Service Modality:  Video Visit:      Provider verified identity through the following two step process.  Patient provided:  Patient photo and Patient     Telemedicine Visit: The patient's condition can be safely assessed and treated via synchronous audio and visual telemedicine encounter.      Reason for Telemedicine Visit: Patient has requested telehealth visit    Originating Site (Patient Location): Patient's home    Distant Site (Provider Location): [facility]: [place] [facility]    Consent:  The patient/guardian has verbally consented to: the potential risks and benefits of telemedicine (video visit) versus in person care; bill my insurance or make self-payment for services provided; and responsibility for payment of non-covered services. Patient was informed this student is supervised under the licensure of licenced psychologist, and patient gave verbal consent for this visit to be observed.     Patient would like the video invitation sent by:  Send to e-mail at: [email or web address]    Mode of Communication:  Video Conference via [facility]    Distant Location (Provider):  On-site    As the provider I attest to compliance with applicable laws and regulations related to telemedicine.    DATA:  Extended Session (53+ minutes): No  Interactive Complexity: No   Crisis: No    Identifying Information:  Patient is a 33 year old, White, heterosexual biologiclaly born female. The pronoun use throughout this assessment reflects the patient's chosen pronoun she/her. Patient was self referred for today's ADHD evaluaton. Patient attended the session alone.      Presenting Concerns/Current Stressors:   Patient  "presented to session to initiate the ADHD evaluation process. Patient indicated the primary concerns include \"anxiety, chronic fatigue, and chronic lateness.\" Patient began by describing significant anxiety symptoms that she experiences including pressure in her chest, fatigue, and worry. Her worry tends to have a theme of safety, and she often worries that something bad will happen to the people that she loves if she does not hear from them in a timely manner. Close and friends have observed that she often is \"rigdid\" with plans.Patient first observed anxiety at age 14, when she thought she was experiencing asthma due to difficulty breathing. Asthma was ruled out medically, and the issue with breathing was not addressed until college when a  informed her that she failed to breathe diaphragmatically in her instruction, and was breathing shallow and short. Her  suggested that she seek help for anxiety. She followed the recommendation of her instructor and sought treatment for anxiety. She indicates she has been diagnosed previously with anxiety although she is uncertain if she has experienced panic specifically. Patient's father  five years ago. In childhood she recalls being on edge and not knowing what to expect, which affected how she held tension in her body. She denied that any abuse occurred. She denied PTSD from childhood experiences or adulthood.The concerns are contributing to distress and decrease functioning in the areas of occupational, relational, and physical and emotional health.    Assessments completed prior to visit:  The following assessments were completed by patient for this visit:  PHQ9:       10/12/2021     5:22 PM 2024    12:07 PM   PHQ-9 SCORE   PHQ-9 Total Score MyChart  3 (Minimal depression)   PHQ-9 Total Score 7 3     GAD7:       2020     7:32 AM 2021     8:29 AM 10/12/2021     5:22 PM 2022     9:41 AM 2024    12:08 PM   SHERRI-7 " SCORE   Total Score 1 (minimal anxiety)   1 (minimal anxiety) 3 (minimal anxiety)   Total Score 1 5 14 1 3     CAGE-AID:       1/16/2024     8:57 PM   CAGE-AID Total Score   Total Score 0   Total Score MyChart 0 (A total score of 2 or greater is considered clinically significant)     PROMIS 10-Global Health (all questions and answers displayed):       1/16/2024     8:55 PM   PROMIS 10   In general, would you say your health is: Very good   In general, would you say your quality of life is: Very good   In general, how would you rate your physical health? Good   In general, how would you rate your mental health, including your mood and your ability to think? Very good   In general, how would you rate your satisfaction with your social activities and relationships? Very good   In general, please rate how well you carry out your usual social activities and roles Good   To what extent are you able to carry out your everyday physical activities such as walking, climbing stairs, carrying groceries, or moving a chair? Completely   In the past 7 days, how often have you been bothered by emotional problems such as feeling anxious, depressed, or irritable? Sometimes   In the past 7 days, how would you rate your fatigue on average? Moderate   In the past 7 days, how would you rate your pain on average, where 0 means no pain, and 10 means worst imaginable pain? 0   In general, would you say your health is: 4   In general, would you say your quality of life is: 4   In general, how would you rate your physical health? 3   In general, how would you rate your mental health, including your mood and your ability to think? 4   In general, how would you rate your satisfaction with your social activities and relationships? 4   In general, please rate how well you carry out your usual social activities and roles. (This includes activities at home, at work and in your community, and responsibilities as a parent, child, spouse, employee,  friend, etc.) 3   To what extent are you able to carry out your everyday physical activities such as walking, climbing stairs, carrying groceries, or moving a chair? 5   In the past 7 days, how often have you been bothered by emotional problems such as feeling anxious, depressed, or irritable? 3   In the past 7 days, how would you rate your fatigue on average? 3   In the past 7 days, how would you rate your pain on average, where 0 means no pain, and 10 means worst imaginable pain? 0   Global Mental Health Score 15   Global Physical Health Score 16   PROMIS TOTAL - SUBSCORES 31     ASSESSMENT:  Mental Status Assessment:  Appearance:   Appropriate   Eye Contact:   Good   Psychomotor Behavior: Normal   Attitude:   Cooperative  Pleasant  Orientation:   All  Speech   Rate / Production: Normal/ Responsive   Volume:  Normal   Mood:    Anxious   Affect:    Appropriate   Thought Content:  Clear  Rumination   Thought Form:  Coherent   Insight:    Good       Safety Issues and Plan for Safety and Risk Management:   [place] Suicide Severity Rating Scale (Short Version)      1/18/2024    12:37 PM   [place] Suicide Severity Rating (Short Version)   1. Wish to be Dead (Since Last Contact) N   2. Non-Specific Active Suicidal Thoughts (Since Last Contact) N   Actual Attempt (Since Last Contact) N   Has subject engaged in non-suicidal self-injurious behavior? (Since Last Contact) N   Interrupted Attempts (Since Last Contact) N   Aborted or Self-Interrupted Attempt (Since Last Contact) N   Preparatory Acts or Behavior (Since Last Contact) N   Suicide (Since Last Contact) N   Calculated C-SSRS Risk Score (Since Last Contact) No Risk Indicated     Patient denies current fears or concerns for personal safety.  Patient denies current or recent suicidal ideation or behaviors.  Patient denies current or recent homicidal ideation or behaviors.  Patient denies current or recent self injurious behavior or ideation.  Patient denies other  safety concerns.  Recommended that patient call 911 or go to the local ED should there be a change in any of these risk factors.   Patient reports there are firearms in the house. The firearms are not secured in a locked space. Client was advised to secure all firearms. Patient denied thoughts of using firearm to harm self or others.     Diagnostic Criteria:  Patient has been diagnosed with generalized anxiety disorder by history. Patient is taking medications that help her to cope with anxiety symptoms, which is evidenced by a decrease in SHERRI-7 scores over time. Initial impressions: Anxiety: Excessive worry, Nervousness, Physical complaints, such as headaches, stomachaches, muscle tension, and Psychomotor agitation, irritability endorsed in the interview that were clinically significant prior to taking medications.It is important to note that while anxiety seems to be treated, attention and difficulty with sustaining attention on tasks persists.    Review of Patient-Reported Symptoms:  Depression: No symptoms, Change in energy level, Difficulties concentrating, and Irritability  Lauren:  No Symptoms  Psychosis: No Symptoms  Anxiety: Excessive worry, Nervousness, Physical complaints, such as headaches, stomachaches, muscle tension, and Psychomotor agitation, irritability.  Panic:  Palpitations, Shortness of breath, Sense of impending doom, and Triggers safety (further assessment for separation anxiety warranted)  Post Traumatic Stress Disorder:  No Symptoms   Eating Disorder: No Symptoms  ADD / ADHD:  Inattentive, Poor task completion, Poor organizational skills, Distractibility, and Restlessness/fidgety- to be further assessed session 2. These are the initially endorsed symptoms.   Conduct Disorder: No symptoms  Autism Spectrum Disorder: No observed symptoms. An autism spectrum disorder diagnosis requires specialized assessment.  Obsessive Compulsive Disorder: No Symptoms and Obsessions- only as it pertains to the  theme of safety of loved ones. No other OCD symptoms endorsed.   Patient reports the following compulsive behaviors and treatment history: Denied.       DSM5 Diagnoses: (Sustained by DSM5 Criteria Listed Above)  Diagnoses: Generalized Anxiety Disorder (F41.1) (diagnosed by history [facility]).    Psychosocial & Contextual Factors: Patient reports being very closely attuned to the behaviors of others, and associated environments. She often worries that others are in danger. Patient tends to respond to anxiety with over-control as a meaningful attempt to manage distress that is not adaptive for her. Contextual grew up in environment that was anxiety provoking, and was protective of self and younger siblings, due to dad's pattern of alcohol use. Patient works for the [employer] and is often worried that her work will be harshly evaluated resulting in frequent double checking her documentation for accuracy. [place], and does not identify as being [place] currently.     PROMIS-10 Scores  Global Mental Health Score: (P) 15  Global Physical Health Score: (P) 16   PROMIS TOTAL - SUBSCORES: (P) 31      Intervention:              Reviewed symptoms and history of presenting concern. Patient endorsed symptoms consistent with anxiety , panic, and ADHD. Further assessment for separation anxiety beginning in childhood; with panic disorder will be evaluated further; assessment of ways physical pain interferes with concentration will be further evaluated. Patient denied symptoms associated with depression , nisa, trauma, perceptual difficulties, and disordered eating. Offered suggestions about behavioral changes to assist with concentration.  More information will be gathered for the purposes of providing a different service than psychotherapy (psychological testing).  CBT: socratic questioning, positive reinforcement  EFT: empathetic attunement, emotion checking, emotion naming  MI: open ended  questions, affirmations, reflections     Attendance Agreement:  Discussed expectations at beginning of this first session and patient agreed.     PLAN:  Provider will continue gathering more information during next session and provide psychoeducation for ADHD and executive functioning deficits. Patient will complete Carlos questionnaires  and CNS Vital Signs prior to next session (Patient agreed to two weeks sobriety period before completing CNS Vital Signs- to be sent out 1/18/24 day of visit 1).    Patient meets the following risk assessment and triage: Patient denied any current/recent/lifetime history of suicidal ideation and/or behaviors.  No safety plan indicated at this time.     Medical necessity criteria is warranted in order to: Measure a psychological disorder and its severity and functional impairment to determine psychiatric diagnosis when a mental illness is suspected, or to achieve a differential diagnosis from a range of medical/psychological disorders that present with similar constellations of symptoms (e.g., determination and measurement of anxiety severity and impact in the presence of ongoing asthma or heart disease), Measure psychological barriers and strengths to aid in treatment planning, including but not limited to the selection of treatment options when several different approaches may be indicated, to determine treatment prognosis and outcomes, or to identify reasons for poor response to treatment, and Perform symptom measurement to objectively measure treatment effectiveness and/or determine the need to refer for pharmacological treatment or other medical evaluation (e.g., based on severity and chronicity of symptoms).    Medical necessity for psychological assessment is warranted as a result of the following: (1) A specific clinical question is posed that relates to the condition/symptoms being addressed (2) The question cannot be adequately addressed by clinical interview and/or  behavioral observation (3) Results of psychological testing are reasonably expected to provide an answer to the query (4) It is reasonably expected that the testing will provide information leading to a clearer diagnosis and/or guide treatment planning with an expectation of improved clinical outcome.    I acknowledge that, based upon current clinical information, the patient and I have reviewed and discussed issues pertaining to the purpose of therapy/testing, potential therapeutic goals, procedures, risks and benefits, and estimated duration of therapy/testing. Issues pertaining to fees/insurance and confidentiality were also addressed with the patient, who indicated understanding and elected to continue with appointments. I will not be providing any experimental procedures and, if we agree that a change in clinical procedure would be more beneficial, I will obtain specific consent for that procedure or refer you to another provider who has expertise in that area.       JEANNINE Reveles, Doctoral practicum student  [facility]   Note was reviewed and clinical supervision provided by Ronald Akins Psy.D, LP  January 22, 2024

## 2024-01-18 ENCOUNTER — VIRTUAL VISIT (OUTPATIENT)
Dept: PSYCHOLOGY | Facility: CLINIC | Age: 34
End: 2024-01-18
Payer: COMMERCIAL

## 2024-01-18 DIAGNOSIS — F41.1 GENERALIZED ANXIETY DISORDER: Primary | ICD-10-CM

## 2024-01-18 PROCEDURE — 90834 PSYTX W PT 45 MINUTES: CPT | Mod: 95

## 2024-01-18 ASSESSMENT — ANXIETY QUESTIONNAIRES
2. NOT BEING ABLE TO STOP OR CONTROL WORRYING: NOT AT ALL
7. FEELING AFRAID AS IF SOMETHING AWFUL MIGHT HAPPEN: NOT AT ALL
1. FEELING NERVOUS, ANXIOUS, OR ON EDGE: SEVERAL DAYS
GAD7 TOTAL SCORE: 3
8. IF YOU CHECKED OFF ANY PROBLEMS, HOW DIFFICULT HAVE THESE MADE IT FOR YOU TO DO YOUR WORK, TAKE CARE OF THINGS AT HOME, OR GET ALONG WITH OTHER PEOPLE?: SOMEWHAT DIFFICULT
4. TROUBLE RELAXING: SEVERAL DAYS
5. BEING SO RESTLESS THAT IT IS HARD TO SIT STILL: NOT AT ALL
3. WORRYING TOO MUCH ABOUT DIFFERENT THINGS: SEVERAL DAYS
GAD7 TOTAL SCORE: 3
GAD7 TOTAL SCORE: 3
6. BECOMING EASILY ANNOYED OR IRRITABLE: NOT AT ALL
7. FEELING AFRAID AS IF SOMETHING AWFUL MIGHT HAPPEN: NOT AT ALL
IF YOU CHECKED OFF ANY PROBLEMS ON THIS QUESTIONNAIRE, HOW DIFFICULT HAVE THESE PROBLEMS MADE IT FOR YOU TO DO YOUR WORK, TAKE CARE OF THINGS AT HOME, OR GET ALONG WITH OTHER PEOPLE: SOMEWHAT DIFFICULT

## 2024-01-18 ASSESSMENT — COLUMBIA-SUICIDE SEVERITY RATING SCALE - C-SSRS
TOTAL  NUMBER OF ABORTED OR SELF INTERRUPTED ATTEMPTS SINCE LAST CONTACT: NO
6. HAVE YOU EVER DONE ANYTHING, STARTED TO DO ANYTHING, OR PREPARED TO DO ANYTHING TO END YOUR LIFE?: NO
2. HAVE YOU ACTUALLY HAD ANY THOUGHTS OF KILLING YOURSELF?: NO
SUICIDE, SINCE LAST CONTACT: NO
ATTEMPT SINCE LAST CONTACT: NO
1. SINCE LAST CONTACT, HAVE YOU WISHED YOU WERE DEAD OR WISHED YOU COULD GO TO SLEEP AND NOT WAKE UP?: NO
TOTAL  NUMBER OF INTERRUPTED ATTEMPTS SINCE LAST CONTACT: NO

## 2024-01-18 ASSESSMENT — PATIENT HEALTH QUESTIONNAIRE - PHQ9
10. IF YOU CHECKED OFF ANY PROBLEMS, HOW DIFFICULT HAVE THESE PROBLEMS MADE IT FOR YOU TO DO YOUR WORK, TAKE CARE OF THINGS AT HOME, OR GET ALONG WITH OTHER PEOPLE: NOT DIFFICULT AT ALL
SUM OF ALL RESPONSES TO PHQ QUESTIONS 1-9: 3
SUM OF ALL RESPONSES TO PHQ QUESTIONS 1-9: 3

## 2024-02-15 ENCOUNTER — VIRTUAL VISIT (OUTPATIENT)
Dept: PSYCHOLOGY | Facility: CLINIC | Age: 34
End: 2024-02-15
Payer: COMMERCIAL

## 2024-02-15 DIAGNOSIS — F41.1 GENERALIZED ANXIETY DISORDER: Primary | ICD-10-CM

## 2024-02-15 PROCEDURE — 90791 PSYCH DIAGNOSTIC EVALUATION: CPT | Mod: 95

## 2024-02-15 NOTE — PROGRESS NOTES
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 the MMPI-3 needs to be completed as soon as possible.  If it is not completed within one and two weeks, email reminders will be sent directly.  The patient was also made aware that the link expires after 30 days and if the test is not completed within that timeframe, it will be her responsibility to reinitiate contact to resume the testing process. My contact information was provided. Patient was in agreement to this plan.    1. Plan for Safety and Risk Management:  Safety and Risk: Recommended that patient call 911 or go to the local ED should there be a change in any of these risk factors..         Report to child / adult protection services was NA.     2. Patient's identified mental health concerns with a cultural influence will be addressed in final recommendations.     3. Initial Treatment will focus on: ADHD testing. See above.      4. Resources/Service Plan:    services are not indicated.   Modifications to assist communication are not indicated.   Additional disability accommodations are not indicated.      5. Collaboration:   Collaboration / coordination of treatment will be initiated with the following  support professionals: primary care physician, outpatient therapist, and psychiatry.      6.  Referrals:   The  following referral(s) will be initiated: [facility]. Next Scheduled Appointment: This student provider to reach out to patient to schedule upon patient competing MMPI-3.    A Release of Information has been obtained for the following: NA.   Emergency Contact was not obtained.    Clinical Substantiation/medical necessity for the above recommendations:     Medical necessity criteria is warranted in order to: Measure a psychological disorder and its severity and functional impairment to determine psychiatric diagnosis when a mental illness is suspected, or to achieve a differential diagnosis from a range of medical/psychological disorders that present with similar constellations of symptoms (e.g., determination and measurement of anxiety severity and impact in the presence of ongoing asthma or heart disease) and Perform symptom measurement to objectively measure treatment effectiveness and/or determine the need to refer for pharmacological treatment or other medical evaluation (e.g., based on severity and chronicity of symptoms).    Medical necessity for psychological assessment is warranted as a result of the following: (1) A specific clinical question is posed that relates to the condition/symptoms being addressed (2) The question cannot be adequately addressed by clinical interview and/or behavioral observation (3) Results of psychological testing are reasonably expected to provide an answer to the query (4) It is reasonably expected that the testing will provide information leading to a clearer diagnosis and/or guide treatment planning with an expectation of improved clinical outcome.    7. MONSERRAT:    MONSERRAT:  Discussed the general effects of drugs and alcohol on health and well-being. Provider gave patient printed information about the effects of chemical use on their health and well being.     8. Records:   These were reviewed at time of assessment.   Information in this assessment was  obtained from the medical record and  provided by patient who is a good historian. Patient will have open access to their mental health medical record.    9. Interactive Complexity: No     Parts of this documentation may have been completed using dictation software. Potential errors may result and are unintentional.       JEANNINE Reveles, Doctoral Practicum Student February 15, 2024  Note was reviewed and clinical supervision provided by Sailaja [email or web address].[email or web address], LP  February 22, 2024

## 2024-02-20 ENCOUNTER — OFFICE VISIT (OUTPATIENT)
Dept: PHYSICAL MEDICINE AND REHAB | Facility: CLINIC | Age: 34
End: 2024-02-20
Payer: COMMERCIAL

## 2024-02-20 DIAGNOSIS — G43.719 INTRACTABLE CHRONIC MIGRAINE WITHOUT AURA AND WITHOUT STATUS MIGRAINOSUS: Primary | ICD-10-CM

## 2024-02-20 PROCEDURE — 99205 OFFICE O/P NEW HI 60 MIN: CPT | Mod: GC | Performed by: PHYSICAL MEDICINE & REHABILITATION

## 2024-02-20 NOTE — PROGRESS NOTES
"CHRONIC MIGRAINE EVALUATION:        REQUESTING PROVIDER: Didi Harvey, Family    CHIEF COMPLAINT:  Chronic migraine headache    HEADACHE PATTERN, HISTORY & PHYSICAL:  Headache Onset History:  Meredith Mello has a history of headaches since childhood, but worsenin in the past 3 years. She is seeking evaluation now because of chronic migraine without aura. Previous evaluation/s include:      Last seen by Neurology on 7/12/2023. At this visit they report \"Her headache presentation meets criteria for chronic migraine without aura.  I suspect she has a genetic basis\". In that note, they report she had a brain MRI that was normal, not available in EPIC at this moment. Agreed with patient on the following plan:  - For acute treatment of mild headache: ibuprofen as needed, not to exceed 14 days per month  - For acute treatment of moderate to severe headache: rizatriptan 5mg to 10 mg, not exceed 9 days per month   - Decrease topiramate 50 mg twice daily to 50 mg at bedtime.    - Increase amitriptyline dose up to 25 mg at bedtime.      On October 2023, Didi Harvey reported the adjustment in the amitriptyline dose had helped decrease the frequency of her migraines, she was tolerating Topamax once daily.  Rizatriptan not helpful.  Patient reported interest in trying Botox injections for her migraines. Plan from that visit as follows:  -start rimegepant (NURTEC) 75 MG ODT tablet,as she failed on rizatriptan   -continue amitriptyline and topiramate (TOPAMAX) 50 MG tablet  -refer to physiatry for consult on Botox injections.     Headache Family History: mother and maternal grandmother    Current Headache Pattern:      Frequency (How many headache days per month?):  2 per week     Duration of Headache:  2-3 days     Aura: no aura     Associated Symptoms:  nausea, light sensitivity, sound sensitivity, and blocked nostril, ear pain    Migraine Complications: no       Description of Headache Pain & Location:  throbbing " or dull pain,  left eye and left temporal area      Level of Pain during usual or 'typical' headache:  4/10      Level of Pain during the worst headache:  9-10/10    Do headaches interfere with or prevent usual activities or diminish patient's productivity at home or work?  Yes - when it is severe    Non-Pharmacologic Treatments Tried:  (such as food trigger elimination diets, biofeedback, relaxation, physical therapy, chiropractic, etc.)  She has tried running (made it worst), heat also made it worst, cold packs not very helpful, feet in hot water with cold pack in neck not helpful, yoga and park-chi did not help either. She has not tried daily specific stretches for migraine  Wrapping head with a pillow and trying to sleep    Medications Tried:    Rescue Meds: rimegepant   NSAIDs: Ibuprofen   Triptan's: Maxalt / Maxalt MLT (Rizatriptan) PO  Adjunctive Therapy: no  Prophylactic Drug Treatment: topiramate, amitriptiline    Emergency Room utilization to treat headache symptoms:  (If so, how often and when was the last time used):  No    Acute Migraine Plan in Place?:  Yes - If yes describe:   Ibuprofen for mild pain   -rimegepant (NURTEC) 75 MG ODT tablet    Are Headaches worsening over time?  Yes - in the last 3 years    What makes headaches better?  dark room, NSAIDs, quiet cool room, caffeine, wrapping head with jacinta    What makes headaches worse or triggers headaches?   Environment: weather, poor sleep, hot water, intense exercise, heat   Food: diminished intake of caffeine causes headache, possible cured meat   Stress: not stress related, she also noticed it did not improved while stopping lexapro for 2 years    Past Medical and Surgical History:          Past Medical History:     Past Medical History:   Diagnosis Date    SHERRI (generalized anxiety disorder)               Past Surgical History:     Past Surgical History:   Procedure Laterality Date    NO HISTORY OF SURGERY      TONSILLECTOMY Bilateral 6/12/2018     Procedure: TONSILLECTOMY;  Bilateral tonsillectomy;  Surgeon: Alex Valentino MD;  Location:  OR            Social History:     Social History     Socioeconomic History    Marital status: Single     Spouse name: Not on file    Number of children: 0    Years of education: 16    Highest education level: Not on file   Occupational History    Occupation: music therapy      Employer: [employer]   Tobacco Use    Smoking status: Never     Passive exposure: Never    Smokeless tobacco: Never   Vaping Use    Vaping Use: Never used   Substance and Sexual Activity    Alcohol use: Yes     Comment: occasional    Drug use: Yes     Types: Marijuana    Sexual activity: Yes     Partners: Male     Birth control/protection: I.U.D.   Other Topics Concern    Parent/sibling w/ CABG, MI or angioplasty before 65F 55M? Not Asked   Social History Narrative    Not on file     Social Determinants of Health     Financial Resource Strain: Low Risk  (10/9/2023)    Financial Resource Strain     Within the past 12 months, have you or your family members you live with been unable to get utilities (heat, electricity) when it was really needed?: No   Food Insecurity: Low Risk  (10/9/2023)    Food Insecurity     Within the past 12 months, did you worry that your food would run out before you got money to buy more?: No     Within the past 12 months, did the food you bought just not last and you didn t have money to get more?: No   Transportation Needs: Low Risk  (10/9/2023)    Transportation Needs     Within the past 12 months, has lack of transportation kept you from medical appointments, getting your medicines, non-medical meetings or appointments, work, or from getting things that you need?: No   Physical Activity: Not on file   Stress: Not on file   Social Connections: Not on file   Interpersonal Safety: Low Risk  (10/9/2023)    Interpersonal Safety     Do you feel physically and emotionally safe where you currently live?: Yes     Within the  past 12 months, have you been hit, slapped, kicked or otherwise physically hurt by someone?: No     Within the past 12 months, have you been humiliated or emotionally abused in other ways by your partner or ex-partner?: No   Housing Stability: Low Risk  (10/9/2023)    Housing Stability     Do you have housing? : Yes     Are you worried about losing your housing?: No            Family History:     Family History   Problem Relation Age of Onset    Hypertension Father     Alcoholism Father     Hyperlipidemia Maternal Grandmother     Coronary Artery Disease No family hx of     Diabetes No family hx of              Immunizations:     Immunization History   Administered Date(s) Administered    COVID-19 Monovalent 18+ (Moderna) 01/22/2021, 02/19/2021, 12/06/2021    Influenza Vaccine >6 months,quad, PF 11/16/2016, 10/04/2017, 11/13/2019    Influenza, seasonal, injectable, PF 10/01/2015    TD,PF 7+ (Tenivac) 07/05/2005    TDAP Vaccine (Adacel) 12/26/2017             Allergies:    No Known Allergies         Medications:     Current Outpatient Medications   Medication    amitriptyline (ELAVIL) 25 MG tablet    escitalopram (LEXAPRO) 10 MG tablet    levonorgestrel (MIRENA) 20 MCG/24HR IUD    rimegepant (NURTEC) 75 MG ODT tablet    rizatriptan (MAXALT) 5 MG tablet    topiramate (TOPAMAX) 50 MG tablet    triamcinolone (KENALOG) 0.5 % external ointment     Current Facility-Administered Medications   Medication    levonorgestrel (MIRENA) 20 MCG/24HR IUD 20 mcg            Review of Systems:   Review Of Systems  Negative except as stated in HPI      VITALS:  There were no vitals taken for this visit.    IMAGING: no images for this visit      ASSESSMENT:  Meredith Mello is a 33 year old female with a history of chronic migraine without aura.   She has tried a number of pharmacologic and non-pharmacologic treatments to manage her chronic migraine headaches with limited success.    At this time, I believe She is a good candidate for  trial of Botox for management of chronic migraine headache.      PLAN:  1.  Submit request for prior authorization for Botox for management of chronic migraine headaches from insurer.  2.  Schedule for injection procedure when authorization has been determined.    I saw and discussed this patient with Dr. Ikramuddin MD Paola Toussaint Gonzalez PGY-2  PM&R      Attending's note   Thank you for allowing us to participate in the care of this patient.   Patient has been seen, examined and evaluated by me independently. I reviewed today's vitals signs, lab values, imaging, medications, and current issues including medical, functional and social history significant to this admission.    Meredith presents with the chronic migraines since childhood, much worse in the last 2 to 3 years.  She notes about 2-3 migraines a week which last for 2 to 3 days.  She has total of 24 migraine days in a month.  She has been seen by neurology on 23 July.  Agree with all the previous medical treatments as mentioned above by the resident.  She is missing about 2 to 3 days a month at work due to the intensity of the migraines.    I do recommend Botox injections for her for the management of the chronic migraines.  We discussed Botox and other treatment options.  I have scheduled her in 2 weeks for possible Botox injections when they get approved.  She is looking forward to being pain-free.  I agree with the above note which reflects my direct input and interpretation of progress.     Candi Sandoval MD   Total of 60 minutes spent in this encounter of which more than 50% was in counseling

## 2024-02-20 NOTE — LETTER
"2/20/2024       RE: Meredith Mello  [address] [place] [address]       Dear Colleague,    Thank you for referring your patient, Meredith Mello, to the [facility] [place] at [facility]. Please see a copy of my visit note below.    CHRONIC MIGRAINE EVALUATION:        REQUESTING PROVIDER: Didi Harvey, Family    CHIEF COMPLAINT:  Chronic migraine headache    HEADACHE PATTERN, HISTORY & PHYSICAL:  Headache Onset History:  Meredith Mello has a history of headaches since childhood, but worsenin in the past 3 years. She is seeking evaluation now because of chronic migraine without aura. Previous evaluation/s include:      Last seen by Neurology on 7/12/2023. At this visit they report \"Her headache presentation meets criteria for chronic migraine without aura.  I suspect she has a genetic basis\". In that note, they report she had a brain MRI that was normal, not available in EPIC at this moment. Agreed with patient on the following plan:  - For acute treatment of mild headache: ibuprofen as needed, not to exceed 14 days per month  - For acute treatment of moderate to severe headache: rizatriptan 5mg to 10 mg, not exceed 9 days per month   - Decrease topiramate 50 mg twice daily to 50 mg at bedtime.    - Increase amitriptyline dose up to 25 mg at bedtime.      On October 2023, Didi Harvey reported the adjustment in the amitriptyline dose had helped decrease the frequency of her migraines, she was tolerating Topamax once daily.  Rizatriptan not helpful.  Patient reported interest in trying Botox injections for her migraines. Plan from that visit as follows:  -start rimegepant (NURTEC) 75 MG ODT tablet,as she failed on rizatriptan   -continue amitriptyline and topiramate (TOPAMAX) 50 MG tablet  -refer to physiatry for consult on Botox injections.     Headache Family History: mother and maternal " grandmother    Current Headache Pattern:      Frequency (How many headache days per month?):  2 per week     Duration of Headache:  2-3 days     Aura: no aura     Associated Symptoms:  nausea, light sensitivity, sound sensitivity, and blocked nostril, ear pain    Migraine Complications: no       Description of Headache Pain & Location:  throbbing or dull pain,  left eye and left temporal area      Level of Pain during usual or 'typical' headache:  4/10      Level of Pain during the worst headache:  9-10/10    Do headaches interfere with or prevent usual activities or diminish patient's productivity at home or work?  Yes - when it is severe    Non-Pharmacologic Treatments Tried:  (such as food trigger elimination diets, biofeedback, relaxation, physical therapy, chiropractic, etc.)  She has tried running (made it worst), heat also made it worst, cold packs not very helpful, feet in hot water with cold pack in neck not helpful, yoga and park-chi did not help either. She has not tried daily specific stretches for migraine  Wrapping head with a pillow and trying to sleep    Medications Tried:    Rescue Meds: rimegepant   NSAIDs: Ibuprofen   Triptan's: Maxalt / Maxalt MLT (Rizatriptan) PO  Adjunctive Therapy: no  Prophylactic Drug Treatment: topiramate, amitriptiline    Emergency Room utilization to treat headache symptoms:  (If so, how often and when was the last time used):  No    Acute Migraine Plan in Place?:  Yes - If yes describe:   Ibuprofen for mild pain   -rimegepant (NURTEC) 75 MG ODT tablet    Are Headaches worsening over time?  Yes - in the last 3 years    What makes headaches better?  dark room, NSAIDs, quiet cool room, caffeine, wrapping head with jacinta    What makes headaches worse or triggers headaches?   Environment: weather, poor sleep, hot water, intense exercise, heat   Food: diminished intake of caffeine causes headache, possible cured meat   Stress: not stress related, she also noticed it did not  improved while stopping lexapro for 2 years    Past Medical and Surgical History:          Past Medical History:     Past Medical History:   Diagnosis Date    SHERRI (generalized anxiety disorder)               Past Surgical History:     Past Surgical History:   Procedure Laterality Date    NO HISTORY OF SURGERY      TONSILLECTOMY Bilateral 6/12/2018    Procedure: TONSILLECTOMY;  Bilateral tonsillectomy;  Surgeon: Alex Valentino MD;  Location:  OR            Social History:     Social History     Socioeconomic History    Marital status: Single     Spouse name: Not on file    Number of children: 0    Years of education: 16    Highest education level: Not on file   Occupational History    Occupation: [employer]      Employer: [employer]   Tobacco Use    Smoking status: Never     Passive exposure: Never    Smokeless tobacco: Never   Vaping Use    Vaping Use: Never used   Substance and Sexual Activity    Alcohol use: Yes     Comment: occasional    Drug use: Yes     Types: Marijuana    Sexual activity: Yes     Partners: Male     Birth control/protection: I.U.D.   Other Topics Concern    Parent/sibling w/ CABG, MI or angioplasty before 65F 55M? Not Asked   Social History Narrative    Not on file     Social Determinants of Health     Financial Resource Strain: Low Risk  (10/9/2023)    Financial Resource Strain     Within the past 12 months, have you or your family members you live with been unable to get utilities (heat, electricity) when it was really needed?: No   Food Insecurity: Low Risk  (10/9/2023)    Food Insecurity     Within the past 12 months, did you worry that your food would run out before you got money to buy more?: No     Within the past 12 months, did the food you bought just not last and you didn t have money to get more?: No   Transportation Needs: Low Risk  (10/9/2023)    Transportation Needs     Within the past 12 months, has lack of transportation kept you from medical appointments, getting your  medicines, non-medical meetings or appointments, work, or from getting things that you need?: No   Physical Activity: Not on file   Stress: Not on file   Social Connections: Not on file   Interpersonal Safety: Low Risk  (10/9/2023)    Interpersonal Safety     Do you feel physically and emotionally safe where you currently live?: Yes     Within the past 12 months, have you been hit, slapped, kicked or otherwise physically hurt by someone?: No     Within the past 12 months, have you been humiliated or emotionally abused in other ways by your partner or ex-partner?: No   Housing Stability: Low Risk  (10/9/2023)    Housing Stability     Do you have housing? : Yes     Are you worried about losing your housing?: No            Family History:     Family History   Problem Relation Age of Onset    Hypertension Father     Alcoholism Father     Hyperlipidemia Maternal Grandmother     Coronary Artery Disease No family hx of     Diabetes No family hx of              Immunizations:     Immunization History   Administered Date(s) Administered    COVID-19 Monovalent 18+ (Moderna) 01/22/2021, 02/19/2021, 12/06/2021    Influenza Vaccine >6 months,quad, PF 11/16/2016, 10/04/2017, 11/13/2019    Influenza, seasonal, injectable, PF 10/01/2015    TD,PF 7+ (Tenivac) 07/05/2005    TDAP Vaccine (Adacel) 12/26/2017             Allergies:    No Known Allergies         Medications:     Current Outpatient Medications   Medication    amitriptyline (ELAVIL) 25 MG tablet    escitalopram (LEXAPRO) 10 MG tablet    levonorgestrel (MIRENA) 20 MCG/24HR IUD    rimegepant (NURTEC) 75 MG ODT tablet    rizatriptan (MAXALT) 5 MG tablet    topiramate (TOPAMAX) 50 MG tablet    triamcinolone (KENALOG) 0.5 % external ointment     Current Facility-Administered Medications   Medication    levonorgestrel (MIRENA) 20 MCG/24HR IUD 20 mcg            Review of Systems:   Review Of Systems  Negative except as stated in HPI      VITALS:  There were no vitals taken for  this visit.    IMAGING: no images for this visit      ASSESSMENT:  Meredith Mello is a 33 year old female with a history of chronic migraine without aura.   She has tried a number of pharmacologic and non-pharmacologic treatments to manage her chronic migraine headaches with limited success.    At this time, I believe She is a good candidate for trial of Botox for management of chronic migraine headache.      PLAN:  1.  Submit request for prior authorization for Botox for management of chronic migraine headaches from insurer.  2.  Schedule for injection procedure when authorization has been determined.    I saw and discussed this patient with Dr. Ikramuddin MD Paola Toussaint Gonzalez PGY-2  PM&R      Attending's note   Thank you for allowing us to participate in the care of this patient.   Patient has been seen, examined and evaluated by me independently. I reviewed today's vitals signs, lab values, imaging, medications, and current issues including medical, functional and social history significant to this admission.    Meredith presents with the chronic migraines since childhood, much worse in the last 2 to 3 years.  She notes about 2-3 migraines a week which last for 2 to 3 days.  She has total of 24 migraine days in a month.  She has been seen by neurology on 23 July.  Agree with all the previous medical treatments as mentioned above by the resident.  She is missing about 2 to 3 days a month at work due to the intensity of the migraines.    I do recommend Botox injections for her for the management of the chronic migraines.  We discussed Botox and other treatment options.  I have scheduled her in 2 weeks for possible Botox injections when they get approved.  She is looking forward to being pain-free.  I agree with the above note which reflects my direct input and interpretation of progress.     Total of 60 minutes spent in this encounter of which more than 50% was in counseling          Again, thank you for  allowing me to participate in the care of your patient.      Sincerely,    Candi Sandoval MD

## 2024-02-20 NOTE — PROGRESS NOTES
"Meredith Mello is a 33 year old female with a history of chronic migraine without aura, referred to this clinic for Botox injections to help with her headache.    Last seen by Neurology on 7/12/2023. At this visit they report \"Her headache presentation meets criteria for chronic migraine without aura.  I suspect she has a genetic basis\". In that note, they report she had a brain MRI that was normal, not available in EPIC at this moment. Agreed with patient on the following plan:  - For acute treatment of mild headache: ibuprofen as needed, not to exceed 14 days per month  - For acute treatment of moderate to severe headache: rizatriptan 5mg to 10 mg, not exceed 9 days per month   - Decrease topiramate 50 mg twice daily to 50 mg at bedtime.    - Increase amitriptyline dose up to 25 mg at bedtime.      On October 2023, Didi Harvey reported the adjustment in the amitriptyline dose had helped decrease the frequency of her migraines, she was tolerating Topamax once daily.  Rizatriptan not helpful.  Patient reported interest in trying Botox injections for her migraines. Plan from that visit as follows:  -start rimegepant (NURTEC) 75 MG ODT tablet,as she failed on rizatriptan   -continue amitriptyline and topiramate (TOPAMAX) 50 MG tablet  -refer to physiatry for consult on Botox injections.     Therapies/HEP,  -Ibuprofen for mild headache  -rimegepant  -topiramate  -amitriptyline    Functionally,   "

## 2024-02-26 DIAGNOSIS — G43.719 INTRACTABLE CHRONIC MIGRAINE WITHOUT AURA AND WITHOUT STATUS MIGRAINOSUS: Primary | ICD-10-CM

## 2024-02-27 ENCOUNTER — TELEPHONE (OUTPATIENT)
Dept: PHYSICAL MEDICINE AND REHAB | Facility: CLINIC | Age: 34
End: 2024-02-27
Payer: COMMERCIAL

## 2024-02-27 NOTE — TELEPHONE ENCOUNTER
Pre-visit planning - Dr. Sandoval.    LVM reminding of check-in time for appointment on 3/5.    Return appointment scheduled: Will schedule next week.     Abril Hendricks NREMT

## 2024-03-04 ENCOUNTER — TELEPHONE (OUTPATIENT)
Dept: PHYSICAL MEDICINE AND REHAB | Facility: CLINIC | Age: 34
End: 2024-03-04
Payer: COMMERCIAL

## 2024-03-04 NOTE — TELEPHONE ENCOUNTER
Writer called patient and informed her that we have been notified by our CAM Finance team that we have not yet received an authorization from her insurance for her Botox, and discussed our options to either reschedule for another 1-2 weeks out, or she can keep the appointment and possibly receive TPI or ONB, if determined that either of these will be helpful in the interim.  She opted to keep her appointment to possibly receive alternative injections and in the event that we receive the approval prior to her appointment, she can go ahead with the Botox.    Keyona Parson RN on 3/4/2024 at 5:47 PM

## 2024-03-05 ENCOUNTER — OFFICE VISIT (OUTPATIENT)
Dept: PHYSICAL MEDICINE AND REHAB | Facility: CLINIC | Age: 34
End: 2024-03-05
Payer: COMMERCIAL

## 2024-03-05 VITALS — RESPIRATION RATE: 16 BRPM

## 2024-03-05 DIAGNOSIS — G43.719 INTRACTABLE CHRONIC MIGRAINE WITHOUT AURA AND WITHOUT STATUS MIGRAINOSUS: Primary | ICD-10-CM

## 2024-03-05 PROCEDURE — 95874 GUIDE NERV DESTR NEEDLE EMG: CPT | Mod: GC | Performed by: PHYSICAL MEDICINE & REHABILITATION

## 2024-03-05 PROCEDURE — 64615 CHEMODENERV MUSC MIGRAINE: CPT | Mod: GC | Performed by: PHYSICAL MEDICINE & REHABILITATION

## 2024-03-05 NOTE — PROGRESS NOTES
MIGRAINE HEADACHE BOTOX CLINIC NOTE    PHYSICAL EXAM:  She does not have any headache now.         HPI:  Meredith Mello is a 33 year old female who complains of headaches since childhood, worsening in the past 3 years.     Current Headache Pattern:                   Frequency (How many headache days per month?):  2 per week                 Duration of Headache:  2-3 days                 Aura: no aura                 Associated Symptoms:  nausea, light sensitivity, sound sensitivity, and blocked nostril, ear pain     Migraine Complications: no                                        Description of Headache Pain & Location:  throbbing or dull pain,  left eye and left temporal area                             Level of Pain during usual or 'typical' headache:  4/10                             Level of Pain during the worst headache:  9-10/10     Do headaches interfere with or prevent usual activities or diminish patient's productivity at home or work?  Yes - when it is severe     Non-Pharmacologic Treatments Tried:  (such as food trigger elimination diets, biofeedback, relaxation, physical therapy, chiropractic, etc.)  She has tried running (made it worst), heat also made it worst, cold packs not very helpful, feet in hot water with cold pack in neck not helpful, yoga and park-chi did not help either. She has not tried daily specific stretches for migraine  Wrapping head with a pillow and trying to sleep     Medications Tried:    Rescue Meds: rimegepant   NSAIDs: Ibuprofen   Triptan's: Maxalt / Maxalt MLT (Rizatriptan) PO  Adjunctive Therapy: no  Prophylactic Drug Treatment: topiramate, amitriptiline    She denies a history of recent head injury.       Patient denies new medical diagnoses, illnesses, hospitalizations, emergency room visits, and injuries since the previous injection with botulinum neurotoxin.     We reviewed the recommended safety guidelines for  Botox from any vaccine injection, such as the  seasonal flu vaccine, by a minimum of 10-14 days with Meredith Mello. She acknowledged understanding.        RESPONSE TO PREVIOUS TREATMENT:  This will be her first set of injections        BOTULINUM NEUROTOXIN INJECTION PROCEDURES:  VERIFICATION OF PATIENT IDENTIFICATION AND PROCEDURE       Initials   Patient Name fi   Patient  fi   Procedure Verified by: melissa      Prior to the start of the procedure and with procedural staff participation, I verbally confirmed the patient s identity using two indicators, relevant allergies, that the procedure was appropriate and matched the consent or emergent situation, and that the correct equipment/implants were available. Immediately prior to starting the procedure I conducted the Time Out with the procedural staff and re-confirmed the patient s name, procedure, and site/side. (The Joint Commission universal protocol was followed.)  Yes     Sedation (Moderate or Deep): None     INDICATIONS FOR PROCEDURES:  Meredith Mello is a 33 year old patient with chronic migraine headaches associated with cervicogenic and oromandibular  components.      Her baseline symptoms have been recalcitrant to oral medications and conservative therapy. Meredith is here today for injection with Botox for the first time     Above assessments performed by:  Patient seen and evaluated with attending physician Dr. Sandoval. The attending provider was present for the entire procedure documented.    Paola Toussaint Gonzalez, MD  Resident Physician PM&R, PGY-2     GOAL OF PROCEDURE:  The goal of this procedure is to decrease pain  and enhance functional independence.     TOTAL DOSE ADMINISTERED:  Dose Administered:  155 units  Botox (Botulinum Toxin Type A)       2:1 Dilution      Diluent Used:  Preservative Free Normal Saline  Total Volume of Diluent Used:  3.8 ml  Lot #  [id number] with Expiration Date:  2020  NDC #: Botox 100u ([address]-33)     Medication guide was offered to patient and was  declined.     CONSENT:  The risks, benefits, and treatment options were discussed with Meredith Mello and she agreed to proceed.     Written consent was obtained by PS.      EQUIPMENT USED:  Needle-25mm stimulating/recording  EMG  30 gauge needle     SKIN PREPARATION:  Skin preparation was performed using an alcohol wipe.     GUIDANCE DESCRIPTION:  Electro-myographic guidance was necessary throughout the posterior neck portion to accurately identify all areas of spastic muscles while avoiding injection of non-spastic muscles, neighboring nerves and nearby vascular structures.      AREA/MUSCLE INJECTED:    1 & 2. SHOULDER GIRDLE & NECK MUSCLES: 50 units Botox = Total Dose, 2:1 Dilution      Muscle Injected  Units administered  Number of sites    Right lateral upper Trapezius 15 units 3   Left lateral upper Trapezius 15 units 3   Right Cervical Paraspinals  10 units 2   Left Cervical Paraspinals  10 units 2   Right Occipitalis 15 unuts 3   Left Occipitalis 15 units 3   Right Temporalis 20 unuts 4   Left Temporalis 20 units 4   Right Frontalis 10 units 2   Left Frontalis 10 units 2   Right   5 units 2   Right   5 units 2   Procerus  5 units 1        Total dose administered  155    Unavoidable waste 45    Total dose Billed  200               RESPONSE TO PROCEDURE:  Meredith Mello tolerated the procedure well and there were no immediate complications.   She was allowed to recover for an appropriate period of time and was discharged home in stable condition.     FOLLOW UP:  Meredith Mello was asked to follow up by phone in 7-14 days with Anuja Kumar RN, Care Coordinator, to report her response to this series of injections.  Based on the patient's previous response to this therapy, Meredith Mello was rescheduled for the next series of injections in 12 weeks.     PLAN (Medication Changes, Therapy Orders, Work or Disability Issues, etc.): Patient will continue to monitor response to today's  injections.

## 2024-03-05 NOTE — LETTER
3/5/2024       RE: Meredith Mello  [address] [place] [address]     Dear Colleague,    Thank you for referring your patient, Meredith Mello, to the [facility] PHYSICAL MEDICINE AND REHABILITATION [facility] [place] [facility]. Please see a copy of my visit note below.    MIGRAINE HEADACHE BOTOX CLINIC NOTE    PHYSICAL EXAM:  She does not have any headache now.         HPI:  Meredith Mello is a 33 year old female who complains of headaches since childhood, worsening in the past 3 years.     Current Headache Pattern:                   Frequency (How many headache days per month?):  2 per week                 Duration of Headache:  2-3 days                 Aura: no aura                 Associated Symptoms:  nausea, light sensitivity, sound sensitivity, and blocked nostril, ear pain     Migraine Complications: no                                        Description of Headache Pain & Location:  throbbing or dull pain,  left eye and left temporal area                             Level of Pain during usual or 'typical' headache:  4/10                             Level of Pain during the worst headache:  9-10/10     Do headaches interfere with or prevent usual activities or diminish patient's productivity at home or work?  Yes - when it is severe     Non-Pharmacologic Treatments Tried:  (such as food trigger elimination diets, biofeedback, relaxation, physical therapy, chiropractic, etc.)  She has tried running (made it worst), heat also made it worst, cold packs not very helpful, feet in hot water with cold pack in neck not helpful, yoga and park-chi did not help either. She has not tried daily specific stretches for migraine  Wrapping head with a pillow and trying to sleep     Medications Tried:    Rescue Meds: rimegepant   NSAIDs: Ibuprofen   Triptan's: Maxalt / Maxalt MLT (Rizatriptan) PO  Adjunctive Therapy: no  Prophylactic Drug Treatment:  topiramate, amitriptiline    She denies a history of recent head injury.       Patient denies new medical diagnoses, illnesses, hospitalizations, emergency room visits, and injuries since the previous injection with botulinum neurotoxin.     We reviewed the recommended safety guidelines for  Botox from any vaccine injection, such as the seasonal flu vaccine, by a minimum of 10-14 days with Meredith Mello. She acknowledged understanding.        RESPONSE TO PREVIOUS TREATMENT:  This will be her first set of injections        BOTULINUM NEUROTOXIN INJECTION PROCEDURES:  VERIFICATION OF PATIENT IDENTIFICATION AND PROCEDURE       Initials   Patient Name fi   Patient  fi   Procedure Verified by: melissa      Prior to the start of the procedure and with procedural staff participation, I verbally confirmed the patient s identity using two indicators, relevant allergies, that the procedure was appropriate and matched the consent or emergent situation, and that the correct equipment/implants were available. Immediately prior to starting the procedure I conducted the Time Out with the procedural staff and re-confirmed the patient s name, procedure, and site/side. (The Joint Commission universal protocol was followed.)  Yes     Sedation (Moderate or Deep): None     INDICATIONS FOR PROCEDURES:  Meredith Mello is a 33 year old patient with chronic migraine headaches associated with cervicogenic and oromandibular  components.      Her baseline symptoms have been recalcitrant to oral medications and conservative therapy. Meredith is here today for injection with Botox for the first time     Above assessments performed by:  Patient seen and evaluated with attending physician Dr. Sandoval. The attending provider was present for the entire procedure documented.    Paola Toussaint Gonzalez, MD  Resident Physician PM&R, PGY-2     GOAL OF PROCEDURE:  The goal of this procedure is to decrease pain  and enhance functional  independence.     TOTAL DOSE ADMINISTERED:  Dose Administered:  155 units  Botox (Botulinum Toxin Type A)       2:1 Dilution      Diluent Used:  Preservative Free Normal Saline  Total Volume of Diluent Used:  3.8 ml  Lot #  [id number] with Expiration Date:  08/2020  NDC #: Botox 100u ([address]-48)     Medication guide was offered to patient and was declined.     CONSENT:  The risks, benefits, and treatment options were discussed with Meredith Mello and she agreed to proceed.     Written consent was obtained by PS.      EQUIPMENT USED:  Needle-25mm stimulating/recording  EMG  30 gauge needle     SKIN PREPARATION:  Skin preparation was performed using an alcohol wipe.     GUIDANCE DESCRIPTION:  Electro-myographic guidance was necessary throughout the posterior neck portion to accurately identify all areas of spastic muscles while avoiding injection of non-spastic muscles, neighboring nerves and nearby vascular structures.      AREA/MUSCLE INJECTED:    1 & 2. SHOULDER GIRDLE & NECK MUSCLES: 50 units Botox = Total Dose, 2:1 Dilution      Muscle Injected  Units administered  Number of sites    Right lateral upper Trapezius 15 units 3   Left lateral upper Trapezius 15 units 3   Right Cervical Paraspinals  10 units 2   Left Cervical Paraspinals  10 units 2   Right Occipitalis 15 unuts 3   Left Occipitalis 15 units 3   Right Temporalis 20 unuts 4   Left Temporalis 20 units 4   Right Frontalis 10 units 2   Left Frontalis 10 units 2   Right   5 units 2   Right   5 units 2   Procerus  5 units 1        Total dose administered  155    Unavoidable waste 45    Total dose Billed  200               RESPONSE TO PROCEDURE:  Meredith Mello tolerated the procedure well and there were no immediate complications.   She was allowed to recover for an appropriate period of time and was discharged home in stable condition.     FOLLOW UP:  Meredith Mello was asked to follow up by phone in 7-14 days with Anuja Kumar  RN, Care Coordinator, to report her response to this series of injections.  Based on the patient's previous response to this therapy, Meredith Mello was rescheduled for the next series of injections in 12 weeks.     PLAN (Medication Changes, Therapy Orders, Work or Disability Issues, etc.): Patient will continue to monitor response to today's injections.             Attestation signed by Candi Sandoval MD at 3/5/2024  3:56 PM:  Patient seen and examined   I was present with the resident throughout the procedure and agree with the plan of care which reflects my direct input.     Candi Sandoavl MD, [facility]   Department of Rehabilitation       Again, thank you for allowing me to participate in the care of your patient.      Sincerely,    Candi Sandoval MD

## 2024-03-05 NOTE — TELEPHONE ENCOUNTER
Called patient back to update that the CAM Finance team has notified that she has now received authorization for today's visit. No answer, left patient a VM.     CAMILA BasurtoN RN  RN Care Coordinator for Physical Medicine and Rehabilitation  [facility] [address] [address]  Office: [phone]  Fax: [phone]

## 2024-04-25 ENCOUNTER — DOCUMENTATION ONLY (OUTPATIENT)
Dept: PSYCHOLOGY | Facility: CLINIC | Age: 34
End: 2024-04-25
Payer: COMMERCIAL

## 2024-04-25 DIAGNOSIS — F41.1 GENERALIZED ANXIETY DISORDER: Primary | ICD-10-CM

## 2024-04-25 NOTE — PROGRESS NOTES
[facility]   Mental Health & Addiction Services     Progress Note - ADHD Feedback Session     Patient Name: Meredith Mello  Date:    May 5, 2024       Service Type:  Individual       Session Start Time: 11:00 AM  Session End Time:  11:35     Session Length:  35 minutes    Session #: 3    Attendees: Patient attended alone    Service Modality: Video Visit:      Provider verified identity through the following two step process.  Patient provided:  Patient photo and Patient     Telemedicine Visit: The patient's condition can be safely assessed and treated via synchronous audio and visual telemedicine encounter.      Reason for Telemedicine Visit: Patient has requested telehealth visit    Originating Site (Patient Location): Patient's home    Distant Site (Provider Location): Provider Remote Setting- Home Office    Consent:  The patient/guardian has verbally consented to: the potential risks and benefits of telemedicine (video visit) versus in person care; bill my insurance or make self-payment for services provided; and responsibility for payment of non-covered services.     Patient would like the video invitation sent by:  Send to e-mail at: [email or web address]    Mode of Communication:  Video Conference via [facility]    Distant Location (Provider):  Off-site    As the provider I attest to compliance with applicable laws and regulations related to telemedicine.    The following assessments were completed by patient for this visit:  GAD7:       2020     7:32 AM 2021     8:29 AM 10/12/2021     5:22 PM 2022     9:41 AM 2024    12:08 PM   SHERRI-7 SCORE   Total Score 1 (minimal anxiety)   1 (minimal anxiety) 3 (minimal anxiety)   Total Score 1 5 14 1 3     PHQ9:       10/12/2021     5:22 PM 2024    12:07 PM   PHQ-9 SCORE   PHQ-9 Total Score MyChart  3 (Minimal depression)   PHQ-9 Total Score 7 3      PROMIS 10-Global Health (only  subscores and total score):       1/16/2024     8:55 PM 5/1/2024    12:44 PM   PROMIS-10 Scores Only   Global Mental Health Score 15 16   Global Physical Health Score 16 14   PROMIS TOTAL - SUBSCORES 31 30        DATA      Progress Since Last Session (Related to Symptoms / Goals / Homework):   Symptoms: Stable.    Homework: Completed.      Treatment Objective(s) Addressed in This Session:   Provided feedback on ADHD evaluation. Reviewed test results in depth. Plan of care and recommendations were discussed based on testing data. See full report attached on secondary note in this encounter.     Intervention:   Provided feedback to patient regarding testing results, diagnoses, and treatment recommendations. Test results are not consistent with an ADHD diagnosis. Symptoms are better explained by anxiety, chronic pain, and stressful life experiences. Personalized suggestions regarding symptoms were offered. Patient had the opportunity to ask questions; she expressed understanding.        ASSESSMENT: Current Emotional / Mental Status (status of significant symptoms):   Risk status (Self / Other harm or suicidal ideation)   Patient denies current fears or concerns for personal safety.   Patient denies current or recent suicidal ideation or behaviors.   Patient denies current or recent homicidal ideation or behaviors.   Patient denies current or recent self injurious behavior or ideation.   Patient denies other safety concerns.   Patient reports there has been no change in risk factors since their last session.     Patient reports there has been no change in protective factors since their last session.     Recommended that patient call 911 or go to the local ED should there be a change in any of these risk factors.     Appearance:   Appropriate    Eye Contact:   Good    Psychomotor Behavior: Normal    Attitude:   Cooperative  Interested Friendly Pleasant   Orientation:   All   Speech    Rate / Production: Normal      Volume:  Normal    Mood:    Normal   Affect:    Appropriate    Thought Content:  Clear    Thought Form:  Coherent  Logical    Insight:    Good      Medication Review:   No changes to current psychiatric medication(s)     Medication Compliance:   Yes     Changes in Health Issues:   Yes: Pain, Associated Psychological Distress, no change indicated. Patient to follow up with neurology.      Chemical Use Review:   Substance Use: Chemical use reviewed, no active concerns identified      Nicotine Use: No current tobacco use.      Diagnosis:  1. Generalized anxiety disorder        PLAN:   Recommendations are outlined in full evaluation report (attached to this encounter).   Patient indicated understanding and will contact the clinic if there are further questions.    Report routed to referring provider.    Parts of this documentation may have been completed using dictation software. Potential errors may result and are unintentional.     JEANNINE Boo (doctoral practicum student)    Psychological Testing Services Summary       Testing Evaluation Services Base: 32064  (first 60 mins) Add-on: 24661  (each addtl 60 mins)   Record Review and Clarify Referral Question   7:50 am on 1/18/2024 10 minutes   Patient Symptom Management  0 minutes   Clinical Decision Making/Battery Modification  0 minutes   Integration/Report Generation   -7:00 pm on 4/25/24 ([facility])  -10:00 am on 4/25/24 ([facility] Vital Signs)  - 10:30 am on 4/25/24 (MMPI-3)  -8:00 pm on 4/25/24 (Final Report)    60 minutes  30 minutes  30 minutes  60 minutes   Interactive Feedback Session   -3:00 pm on 5/2/24 45 minutes   Post-Service Work   - 11:50 am on 5/2/24 10 minutes   Total Time: 245 minutes   Total Units: 1 3       JEANNINE Reveles (doctoral practicum student)  [facility] Mental Health & Addiction [facility]      Note was reviewed and clinical supervision provided by Ronald Akins Psy.D, FATMATA  May 2, 2024

## 2024-04-25 NOTE — PROGRESS NOTES
Psychological Assessment Report    Patient: Meredith Vergara  YOB: 1990  MRN: [id number]   Date(s) of assessment:  1/18/2024, 2/15/2024  Referral Source: [facility] PCP, Didi Harvey NP  Reason for Referral: Assessing reported deficits in executive functioning     IDENTIFYING INFORMATION AND BRIEF HISTORY OF PRESENTING PROBLEM: Presenting Patient Meredith Mello is a 33 year old, White cis-gender female. The pronoun use throughout this assessment reflects the patient's chosen pronouns she/her/hers. Patient was referred for ADHD testing by her primary care provider, Didi Harvey NP. Patient presented for the initial diagnostic intake appointments on 1/18/24 (see diagnostic intake dated 2/15/24 for more detailed background information) due to primary concerns with attention and related concerns..     Mental Health History: The patient denied a history of manic symptoms, social anxiety, phobic responses, symptoms of obsessive-compulsive disorder, trauma, or perceptual difficulties. The patient denied issues with sexual compulsivity, gambling, and disordered eating.Patient previously received the diagnosis of generalized anxiety disorder. Patient commonly experiences anxiety as tighness in the chest that is frequent for her. Through her work in therapy, she reports an increase in awareness of how symptoms manifest for her, which is often in the form of physical sensation. She reports having perfectionistic tendencies, and internal conflict. Patient is currently receiving psychotherapy and medication management services which have been helpful for her, and have aided in symptom reduction. Patient declined a history of hospitalizations or commitments for mental health reasons.     Specific attention and related concerns at the time of the initial interview included the following symptoms: making careless mistakes/problems attending to details, difficulty sustaining attention,  problems listening when spoken to directly, not following through with tasks, difficulty organizing, avoiding tasks that require sustained mental effort, losing things often, being easily distracted, and being forgetful. Regarding primary hyperactive symptoms, patient recalls feeling too scared to get out of her seat and she is uncertain if she had the impulse to do so. Patient reported that she has not been assessed for ADHD in the past. Symptoms reportedly began in late adolescence, and increased severity in the last several years. Client reported that other professionals are involved in providing services, as she was referred by her PCP for attention concerns. Patient works with a therapist and feels it is helpful. She did not notice as much of the attention issues as a child, she was always impatient because she knew what was next and would wait for others to catch up to her. Once she got in trouble for getting ahead in a science class. Reports some struggles with impulsive spending in adulthood. This patient struggles with chronic pain associated with a headache condition. She takes medications that she reports cause fatigue. She sometimes has difficulty with sleep due to anxiety.     Developmental History: The patient reported that she believes that she is the product of a full-term pregnancy and there were no complications during her mother's pregnancy or birth. The patient reported that she believes she met all of her developmental milestones on time.The patient denied a history of learning disorders, special education programming, and receiving tutoring services. Patient denied a history of head injuries. She recalled academic strengths in english and attended AP english in high school as well as weaknesses in applied science. She received mostly A's and B's. In high school remembers that she floated through, and did not need to put in effort into understanding subjects. The patient's highest education  "level is graduate school.      Chemical Dependence/Substance Abuse History: The patient denied a history of chemical or substance abuse.    Other Relevant History:   She observed peers were more easy going than she was. She always had an \"inner conflict\" and felt \"terrified of conflict.\" Patient reported that childhood was stressful. She recalls trouble falling asleep. She was often afraid to bother others, and would always follow the rules due to father's \"strictness.\" Always felt an underlying current of \"being held back by fear.\"Described her current relationships with family of origin as distant and a lot of ridigity. She describes her sibling as outgoing and independent, and opposite of how she sees herself. She was a parentified child, as a result of her father's excessive drinking and financial concerns. She balanced her father's checkbook at a young age. She had a sense of not having enough in childhood. She reports difficulties with setting boundaries and being direct, and this has displayed at work. She has a fear of displinary action, leading to lack of sleep and catastrophizing. She is actively working on self soothing skills.     SOURCES OF DATA/ASSESSMENT: Review of medical and psychiatric records, consideration of behavioral observations, and the results of the psychological tests are all considered in the preparation of this psychological test report. It is important to note that test results comprise a hypothesis of the patient's mental health concerns and are not an independent or conclusive assessment. Test results are combined with the patient's available medical, psychological, behavioral data for an integrated interpretation and report. Due to virtual/remote administration, certain aspects of the assessment process were impacted, such as access to direct patient observation, and maintaining an environment conducive to testing. As such, external factors have the potential to affect the validity " of data collected.    TESTS ADMINISTERED:  CNS Vital Signs Neurocognitive Battery  Carlos Adult ADHD Rating Scale-IV: Self and Other Reports (BAARS-IV)  Carlos Functional Impairment Scale: Self and Other Reports (BFIS)  Carlos Deficits in Executive Functioning Scale (BDEFS)  Generalized Anxiety Disorder Questionnaire (SHERRI-7)  Patient Health Questionnaire- 9 (PHQ-9)  [place] Multiphasic Personality Inventory - 3 (MMPI - 3)     BEHAVIORAL OBSERVATIONS: The patient was pleasant and cooperative throughout all interview and explanation of testing process. The patient was oriented to person, place, and time. Mood was neutral. Eye contact was adequate and speech was at normal rate and rhythm. Motor activity was appropriate. Due to virtual/remote administration, direct patient observation was not possible during the testing process, and it is unknown if the patient was able to maintain an environment conducive to testing. As such, external factors have the potential to affect the validity of data collected.     TEST RESULTS: Test results comprise a hypothesis of the patient's mental health concerns and are not an independent or conclusive assessment. Test results are combined with the patient's available medical, psychological, behavioral, and observational data for an integrated interpretation and report.    CNS Vital Signs Neurocognitive Battery  The CNS Vital Signs Neurocognitive Battery is a remotely-administered assessment comprised of seven core subtests to individually measure the patient's verbal memory, visual memory, motor speed, psychomotor speed, reaction time, focus, ability to sustain attention and ability to adapt to changing rules and tasks.      Above average domain scores indicate a standard score (SS) greater than 109 or a Percentile Rank (OH) greater than 74, indicating a high functioning test subject. Average is a SS  or OH 25-74, indicating normal function. Low Average is a SS 80-89 or OH  9-24 indicating a slight deficit or impairment. Below Average is a SS 70-79 or WI 2-8, indicating a moderate level of deficit or impairment. Very Low is a SS less than 70 or a WI less than 2, indicating a deficit and impairment.  Validity Indicator denotes a guideline for representing the possibility of an invalid test or domain score, and can be influenced by patient understanding, effort, or other conditions.    The patient's results are detailed below:    Domain Standard Score Percentile Description Validity   Neurocognitive Index 111 77 Above Average Yes   Composite Memory Measure 117 87 Above Average Yes   Verbal Memory 109 73 Average Yes   Visual Memory 119 90 Above Average Yes   Psychomotor Speed 121 92 Above Average Yes   Reaction Time 93 32 Average Yes   Complex Attention 113 81 Above Average Yes   Cognitive Flexibility 111 77 Above Average Yes   Processing Speed 117 87 Above Average Yes   Executive Function 110 75 Above Average Yes   Reasoning 99 47 Average Yes   Working Memory 118 88 Above Average Yes   Sustained Attention  119 90 Above Average Yes   Simple Attention 107 68 Average Yes   Motor Speed 117 87  Above Average Yes     Neurocognitive Index (NCI): Measures an average score derived from the domain scores or a general assessment of the overall neurocognitive status of the patient. The patient's NCI score is 111, with a percentile of 77, and falls within the above average range.    Composite Memory: Measures how well subject can recognize, remember, and retrieve words and geometric figures, and is comprised of the Visual and Verbal Memory domains. The patient's Composite Memory score is 117, with a percentile of 87, and falls within the above average range.    Verbal Memory: Measures how well subject can recognize, remember, and retrieve words. The patient's Verbal Memory score is 109, with a percentile of 73, and falls within the average range.    Visual Memory: Measures how well subject can  recognize, remember and retrieve geometric figures. The patient's Visual Memory score is 119, with a percentile of 90, and falls within the above average range.    Psychomotor Speed: Measures how well a subject perceives, attends, responds to complex visual-perceptual information and performs simple fine motor coordination, and is comprised of the Motor Speed and Processing Speed indexes. The patient's Psychomotor Speed score is 121, with a percentile of 92, and falls within the above average range.    Reaction Time: Measures how quickly the subject can react, in milliseconds, to a simple and increasingly complex direction set. The patient's Reaction Time score is 93, with a percentile of 32, and falls within the average range.    Complex Attention: Measures the ability to track and respond to a variety of stimuli over lengthy periods of time and/or perform complex mental tasks requiring vigilance quickly and accurately. The patient's Complex Attention score is 113, with a percentile of 81, and falls within the above average range.    Cognitive Flexibility: Measures how well subject is able to adapt to rapidly changing and increasingly complex set of directions and/or to manipulate the information. The patient's Cognitive Flexibility score is 111, with a percentile of 77, and falls within the above average range.    Processing Speed: Measures how well a subject recognizes and processes information i.e., perceiving, attending/responding to incoming information, motor speed, fine motor coordination, and visual-perceptual ability. The patient's Processing Speed score is 117, with a percentile of 87, and falls within the above average range.    Executive Function: Measures how well a subject recognizes rules, categories, and manages or navigates rapid decision making. The patient's Executive Function score is 110, with a percentile of 75, and falls within the above average range.    Reasoning: Measures how well a  subject can perceive and understand the meaning of visual or abstract information and recognizing relationships between visual-abstract concepts. The patient's Reasoning score is 99, with a percentile of 47, and falls in the average range.     Working Memory: Measures how well a subject can perceive and attend to symbols using short-term memory processes. Also measures the ability to carry out short-term memory tasks that support decision making, problem solving, planning, and execution. The patient's Working Memory score is 118, with a percentile of 88, and falls in the above average range.    Sustained Attention: Measures how well a subject can direct and focus cognitive activity on specific stimuli. Also measurs how well a subject can focus and complete task or activity, sequence action, and focus during complex thought. The patient's Sustained Attention score is 119, with a percentile of 90, and falls in the above average range.    Simple Attention: Measures the ability to track and respond to a single defined stimulus over lengthy periods of time while performing vigilance and response inhibition quickly and accurately to a simple task. The patient's Simple Attention score is 107, with a percentile of 68, and falls within the average range.    Motor Speed: Measure: Ability to perform simple movements to produce and satisfy an intention towards a manual action and goal. The patient's Motor Speed score is 117, with a percentile of 87, and falls within the above average range.    Carlos Adult ADHD Rating Scale-IV: Self and Other Reports (BAARS-IV)  The BAARS-IV assesses for symptoms of ADHD that are experienced in one's daily life. This assessment measure includes self and collateral rating scales designed to provide information regarding current and childhood symptoms of ADHD including inattention, hyperactivity, and impulsivity. Self-report scores are reported as percentiles. Scores at the 76th-83rd percentile  "are considered marginal, scores at the 84th-92nd percentile are considered borderline, scores at the 93rd-95th percentile are considered mild, scores at the 96th-98th percentile are considered moderate, and those at the 99th percentile are considered severe. Collateral or \"other\" rating scales are reported as number of symptoms observed in comparison to those reported by the client. Norms and percentile scores are not available for collateral reports.     Current Symptoms Scale--Self Report:   Client completed the self-report inventory of current symptoms. The results indicate that the client's Total ADHD Score was 39 which places the patient in the 93 percentile for overall ADHD symptoms. In addition, the client endorsed 3/9 (92 percentile) Inattention symptoms, 2/9 (88 percentile) Hyperactivity-Impulsivity symptoms, and 3/9 (88 percentile) Sluggish Cognitive Tempo symptoms. Client indicated that the reported symptoms have resulted in impaired functioning in home and work. Overall, the results suggest the client is experiencing borderline ADHD symptoms.     Current Symptoms Scale--Other Report:  Client's spouse completed the collateral report inventory of current symptoms. Based on the collateral contact's observation of symptoms, the client demonstrates 0/9 Inattention symptoms, 0/5 Hyperactivity symptoms, 0/4 Impulsivity symptoms, and 0/9 Sluggish Cognitive Tempo symptoms. The client's Total ADHD Score was 21. The collateral contact indicated the client does not demonstrate impaired functioning in any of the indicated domains. The collateral- and self-report scores are significantly different.    Childhood Symptoms Scale--Self-Report:  Client completed the self-report inventory of childhood symptoms. The results indicate that the client's Total ADHD Score was 25 which places the patient in the (51 - 75 percentile) for overall ADHD symptoms in childhood. In addition, the client endorsed 0/9 (1 - 75 percentile) " "Inattention symptoms and 0/9 (1 - 75 percentile) Hyperactivity-Impulsivity symptoms. Client indicated that the reported symptoms did not result in impaired functioning in the indicated domains. Overall, the results suggest the client experienced an absence symptoms of ADHD as a child.     Childhood Symptoms Scale--Other Report:  Client's mother completed the collateral report inventory of childhood symptoms. Based on the collateral contact's recollection of client's childhood symptoms, the client demonstrated 0/9 Inattention symptoms and 0/9 Hyperactivity-Impulsivity symptoms. The client's Total ADHD Score was 27. The collateral contact indicated the client demonstrates the absence of impaired functioning in the indicated domains. The collateral- and self-report scores are not significantly different.                           Carlos Functional Impairment Scale: Self and Other Reports (BFIS)  The BFIS is used to assess an individuals' psychosocial impairment in major life/daily activities that may be due to a mental health disorder. This assessment measure includes self and collateral rating scales. Self-report scores are reported as percentiles. Scores at the 76th-83rd percentile are considered marginal, scores at the 84th-92nd percentile are considered borderline, scores at the 93rd-95th percentile are considered mild, scores at the 96th-98th percentile are considered moderate, and those at the 99th percentile are considered severe. Collateral or \"other\" rating scales are reported as number of symptoms observed in comparison to those reported by the client. Norms and percentile scores are not available for collateral reports.     Results indicate the client did not identify impairment in any areas indicated (scores at or greater than 93rd percentile). The client's Mean Impairment Score was 2.25 (51 - 75 percentile) indicating the client is reporting minimal impairment in functioning across domains. Client's " "spouse completed the collateral rating scale, which indicated similar results. The collateral contact's scores were generally slightly higher than the client's report.     Carlos Deficits in Executive Functioning Scale (BDEFS)  The BDEFS is a measure used for evaluating dimensions of adult executive functioning in daily life. This assessment measure includes self and collateral rating scales. Self-report scores are reported as percentiles. Scores at the 76th-83rd percentile are considered marginal, scores at the 84th-92nd percentile are considered borderline, scores at the 93rd-95th percentile are considered mild, scores at the 96th-98th percentile are considered moderate, and those at the 99th percentile are considered severe. Collateral or \"other\" rating scales are reported as number of symptoms observed in comparison to those reported by the client. Norms and percentile scores are not available for collateral reports.     Results indicate the client's Total Executive Functioning Score was 154 (51 - 75 percentile). The ADHD-Executive Functioning Index score was 21 (77 percentile). These scores suggest the client has marginal deficits in executive functioning. Results indicate the client identified significant deficits in the following areas: self-management to time (severe deficits), self-organization/problem-solving (marginal deficits), self-restraint (marginal deficits), self-motivation (marginal deficits) and self-regulation of emotions (marginal deficits). Client's spouse completed the collateral rating scale, which indicated similar results. The collateral contact's scores were generally lower than the client's report.    Generalized Anxiety Disorder Questionnaire (SHERRI-7)  This questionnaire is designed to assess for anxiety in adults. Based on the score, the patient is experiencing mild symptoms of anxiety. Client identified the following symptoms of anxiety: feeling on edge/nervous/anxious, trouble " relaxing, being restless, and feeling something awful might happen.    Patient Health Questionnaire- 9 (PHQ-9)   This questionnaire is designed to assess for depression in adults. Based on the score, the patient is experiencing mild symptoms of depression. Client identified the following symptoms of depression: depressed mood, lack of interest, difficulty with sleep, feeling tired or having little energy, and poor appetite or overeating.     [place] Multiphasic Personality Inventory - 3 (MMPI-3)    The MMPI-3 was administered to evaluate current level of emotional distress. Validity profile indicates that the patient appears to have answered in a generally straightforward and consistent manner, and obtained results are considered to be reliable and valid in representing current psychological status. No items were omitted.      There are no indications of somatic or cognitive dysfunction in this protocol. This patient may struggle with irritability, with the presence of stress, and worry. However, this patient is hopeful that she will be able to improve her situation or symptoms. Her fear may restrict her behavior, and she may not do things she wants to do because of the internal experience of fear or significant feelings of overwhelm. Additionally, a long lasting trait indicating negative emotionality, or neuroticism is present for this patient, but is also found in individuals in academia, who tend to question using logic, curiosity and longing for deeper meaning. No indications of distorted thinking were present in this profile. No behavioral concerns are indicated, however, this patient likely experiences low self confidence, and has a tendency to put trust in others, or think the best of other's and their intentions. This patient shows good impulse control, with low likelihood of impulsivity indicated in this profile. The patient experiences slightly lower energy, of feeling cognitively slowed down at times.  Regarding  interpersonal functioning Meredith may be submissive in social situations, but does not tend towards introversion or extroversion. It is likely that this patient would like to interact with others more than she currently does but anxiety or the sense of internal criticism, and anxiety gets in her way. She is aware of how her behavior affects others and may think at length about what actions she takes so to not let others down as she likely deeply cares for others.    SUMMARY: Presenting patient Meredith Mello is a 33 year old, White, cis- gender female, who completed psychological testing remotely/virtually due to the COVID-19 pandemic. Testing was requested to provide updated diagnostic clarification and necessary treatment recommendations and patient was referred by her primary care provider, Didi Harvey NP.    Patient first completed a diagnostic interview in which mental health symptoms, ADHD symptoms, and background information was gathered. Patient self-reported making careless mistakes/problems attending to details, difficulty sustaining attention, problems listening when spoken to directly, not following through with tasks, difficulty organizing, avoiding tasks that require sustained mental effort, losing things often, being easily distracted, and being forgetful. Patient did not self endorse hyperactivity or impulsivity assessments at the time of the diagnostic interview. However, patient indicated at the initial interview, that  symptoms of inattention and indicated that her abilities to perform work related tasks and cores are significantly impaired. Further, her self-reported symptoms on Carlos measures of ADHD symptoms were consistent with this information. Both her spouse and mother reported to observe an absence of significant symptoms in her currently and as a child.     An objective measure of personality indicated an absence of somatic or cognitive dysfunction. The patient  struggles with anxiety, and irritability, especially when faced with complex tasks. However, the patient is hopeful in her ability to improve her situation. She does not tend towards introversion or extroversion, yet she seems to keep to herself, and may want to put herself out there more in social situations than she does currently. While she does not struggle with a thought disorder, she lacks confidence at times. Additionally, a long lasting trait of neuroticism is present for her, which is more common in those in academia, that often question the truth, and strive to understand deeper meaning versus surface observations. In relationships, she may tend towards over-trusting others, and may not see other's flaws or may overlook other's wrongdoing. The patient experiences low energy at times. She likely puts a great deal into her actions, and may fear she will let others down because of her deep empathy for others.     An objective measure of neurocognitive functioning indicated that the patient was able to recognize target words from a word list immediately in the average range and in the average range after a delay. Visual memory score to be in the overall above average range. She was able to recognize target shapes immediately in the above average range and in the average range after a delay. Reasoning scored to be in the average range, as the patient was able to solve nonverbal puzzle matrices with equivocal efficiently as peers. Processing speed score to be in the above average range, as she was able to scan and respond to visual stimuli more efficiently comparable to peers.     On the Stroop test, the patient was able to inhibit the salient, but incorrect, response in the average range. She responded to stimuli in the average range with slight delay in reaction time in absence of omission errors, as a result, cognitive flexibility and executive functioning were scored to be in the above average range on this  portion of the test. Complex attention was scored to be in the above average range.     On a test of continuous performance, the patient was able to monroe her attention and resist making impulsive mistakes in the average range; simple attention scored to be in the average range. On a more complex continuous performance test that included one-back and two-back components, the patient was able to sustain her attention across time and hold information in mind for short-term manipulation in the above average range suggesting above average ability in working memory.     Referral Question Response: DSM-5 criteria for ADHD:   A. Symptom Count - Are there sufficient symptoms for the diagnosis? No, patient did not endorse sufficient symptoms.   B. Onset - Were several symptoms present before 13 years of age? No, symptoms reportedly began at age 21.   C. Pervasiveness - Are several symptoms present in at least two settings? Yes, patient reported that symptoms are problematic at home, school, and work.   D. Impairment - Do symptoms interfere with or reduce the quality of functioning? Yes, patient self reported difficulty with work, and home tasks, and time management across environments.   E. Exclusions - Are symptoms better explained by another disorder or factor? Yes, symptoms are better explained by anxiety and depression symptoms along with chronic pain and possibly the related side effects of medication. Difficulties are not explained by an organic basis of inattention.     The patient meets the following DSM-5 criteria for generalized anxiety disorder:  A. Excessive anxiety and worry, occurring more days than not for at least 6 months about a number of events or activities.   B. The individual finds it difficult to control the worry.  C. The anxiety and worry are associated with 3 or more of 6 symptoms.  D. The anxiety, worry, or physical symptoms cause clinically significant distress or impairment in social,  occupational, or other important areas of functioning.  E. The disturbance is not attributable to the physiological effects of a substance (e.g., a drug of abuse, a medication) or another medical condition (e.g., hyperthyroidism).  F. The disturbance is not better explained by another mental disorder.    While a diagnosis of major depression was considered, it is likely that family/systemic concerns resulting from home environment in childhood, along with medical condition (headache), lack of quality sleep due to anxiety and excessive worry are the primary contributing factors that make time management, and concentration difficult across environments for this patient.     DIAGNOSES:  F41.1 Generalized Anxiety Disorder    PLAN OF CARE:  Follow up with your primary care provider, and specialists including neurology to address factors that could contribute to cognitive slowing, including but not limited to difficulty sleeping, vitamin and hormone levels, and chronic pain.     Consider increasing the frequency of individual psychotherapy to help alleviate anxiety symptoms. Patient indicated that she has addressed family and other systemic factors that have contributed to relational distress for her. However, she indicated that the exploration of the deeper impact on her sense of self is something that she has not addressed in therapy, and this is strongly recommended.   Psychotherapists that are trained in psychodynamic treatment, (short term or long term models) would be able to assist in exploration of deeper impact of adversity in childhood, and it's affects on the relationship with the self.   Research indicates that outcomes are best with both medication and therapy.     RECOMMENDATIONS:  Due to the patient's reported attention, concentration, anxiety, and mood difficulties, the following health/lifestyle changes when combined, can significantly improve symptoms:   Avoid simple carbohydrates at breakfast. Aim for  only complex carbohydrates and lean protein for your morning meal.   Engage in aerobic exercise 3 times per week for 30 minutes, ensuring that your heart rate stays within your training zone. Further, reading the book,  Spark,  by Taiwo Hernandez M.D can help the patient understand the benefits of exercise on the brain.   Research suggest that taking a high-quality multi-vitamin and antioxidant (1/2 cup of blueberries) daily in conjunction with balanced nutrition can be helpful.  Aim for the high end of daily water intake: around 72 ounces per day.  Ensure regular meals and snacks to maintain optimal attention.    The following may be beneficial in managing some of the patient's attention and concentration difficulties:  Due to the patient's difficulties with attention and concentration, consider working in a completely distraction-free area while completing tasks. Workspaces should be completely clear except for the materials needed for the current task. Both visual and auditory distractions should be decreased as much as possible.  Considering decreased ability to focus and maintain attention, it is recommended that the patient take frequent breaks while completing tasks. This will help to maintain attention and effort. The patient may benefit from the use of a [employer] Timer. The timer works by using built-in break times. After working on a task consistently for 25 minutes, the timer reminds the user to take a five-minute break before continuing, etc. A [employer] timer can be downloaded as a free liana to a phone or tablet.    Avoid multitasking. Attempting to work on multiple tasks and projects the same increases the likelihood that an error will occur. Focus on one task at a time.    Due to the patient's difficulties with sleep, it recommended to engage in a relaxing activity up to the point of sleep. The patient may want to spend time reading, drawing/ coloring, practicing mindfulness, listening (not watching) to  podcasts, music, etc., or try the [email or web address] liana, which is free to download and may aid falling asleep more easily.    The patient may benefit from engaging in mindfulness practices. This may include breathing techniques, apps that provide guided meditation, or more interactive activities such as coloring.      JEANNINE Reveles (doctoral practicum student)    Note was reviewed and clinical supervision provided by Sailaja Vazquez.TATO, LP  April 29, 2024

## 2024-05-02 ENCOUNTER — VIRTUAL VISIT (OUTPATIENT)
Dept: PSYCHOLOGY | Facility: CLINIC | Age: 34
End: 2024-05-02
Payer: COMMERCIAL

## 2024-05-02 DIAGNOSIS — F41.1 GENERALIZED ANXIETY DISORDER: Primary | ICD-10-CM

## 2024-05-02 PROCEDURE — 90834 PSYTX W PT 45 MINUTES: CPT | Mod: 95

## 2024-05-02 PROCEDURE — 96131 PSYCL TST EVAL PHYS/QHP EA: CPT | Mod: 95

## 2024-05-02 PROCEDURE — 96130 PSYCL TST EVAL PHYS/QHP 1ST: CPT | Mod: 95

## 2024-05-02 NOTE — PROGRESS NOTES
Psychological Assessment Report     Patient: Meredith Vergara  YOB: 1990  MRN: [id number]   Date(s) of assessment:  1/18/2024, 2/15/2024  Referral Source: [facility] PCP, Didi Harvey NP  Reason for Referral: Assessing reported deficits in executive functioning      IDENTIFYING INFORMATION AND BRIEF HISTORY OF PRESENTING PROBLEM: Presenting Patient Meredith Mello is a 33 year old, White cis-gender female. The pronoun use throughout this assessment reflects the patient's chosen pronouns she/her/hers. Patient was referred for ADHD testing by her primary care provider, Didi Harvey NP. Patient presented for the initial diagnostic intake appointments on 1/18/24 (see diagnostic intake dated 2/15/24 for more detailed background information) due to primary concerns with attention and related concerns..      Mental Health History: The patient denied a history of manic symptoms, social anxiety, phobic responses, symptoms of obsessive-compulsive disorder, trauma, or perceptual difficulties. The patient denied issues with sexual compulsivity, gambling, and disordered eating.Patient previously received the diagnosis of generalized anxiety disorder. Patient commonly experiences anxiety as tighness in the chest that is frequent for her. Through her work in therapy, she reports an increase in awareness of how symptoms manifest for her, which is often in the form of physical sensation. She reports having perfectionistic tendencies, and internal conflict. Patient is currently receiving psychotherapy and medication management services which have been helpful for her, and have aided in symptom reduction. Patient declined a history of hospitalizations or commitments for mental health reasons.      Specific attention and related concerns at the time of the initial interview included the following symptoms: making careless mistakes/problems attending to details, difficulty sustaining attention,  problems listening when spoken to directly, not following through with tasks, difficulty organizing, avoiding tasks that require sustained mental effort, losing things often, being easily distracted, and being forgetful. Regarding primary hyperactive symptoms, patient recalls feeling too scared to get out of her seat and she is uncertain if she had the impulse to do so. Patient reported that she has not been assessed for ADHD in the past. Symptoms reportedly began in late adolescence, and increased severity in the last several years. Client reported that other professionals are involved in providing services, as she was referred by her PCP for attention concerns. Patient works with a therapist and feels it is helpful. She did not notice as much of the attention issues as a child, she was always impatient because she knew what was next and would wait for others to catch up to her. Once she got in trouble for getting ahead in a science class. Reports some struggles with impulsive spending in adulthood. This patient struggles with chronic pain associated with a headache condition. She takes medications that she reports cause fatigue. She sometimes has difficulty with sleep due to anxiety.      Developmental History: The patient reported that she believes that she is the product of a full-term pregnancy and there were no complications during her mother's pregnancy or birth. The patient reported that she believes she met all of her developmental milestones on time.The patient denied a history of learning disorders, special education programming, and receiving tutoring services. Patient denied a history of head injuries. She recalled academic strengths in english and attended AP english in high school as well as weaknesses in applied science. She received mostly A's and B's. In high school remembers that she floated through, and did not need to put in effort into understanding subjects. The patient's highest education  "level is graduate school.       Chemical Dependence/Substance Abuse History: The patient denied a history of chemical or substance abuse.     Other Relevant History:   She observed peers were more easy going than she was. She always had an \"inner conflict\" and felt \"terrified of conflict.\" Patient reported that childhood was stressful. She recalls trouble falling asleep. She was often afraid to bother others, and would always follow the rules due to father's \"strictness.\" Always felt an underlying current of \"being held back by fear.\"Described her current relationships with family of origin as distant and a lot of ridigity. She describes her sibling as outgoing and independent, and opposite of how she sees herself. She was a parentified child, as a result of her father's excessive drinking and financial concerns. She balanced her father's checkbook at a young age. She had a sense of not having enough in childhood. She reports difficulties with setting boundaries and being direct, and this has displayed at work. She has a fear of displinary action, leading to lack of sleep and catastrophizing. She is actively working on self soothing skills.     SOURCES OF DATA/ASSESSMENT: Review of medical and psychiatric records, consideration of behavioral observations, and the results of the psychological tests are all considered in the preparation of this psychological test report. It is important to note that test results comprise a hypothesis of the patient's mental health concerns and are not an independent or conclusive assessment. Test results are combined with the patient's available medical, psychological, behavioral data for an integrated interpretation and report. Due to virtual/remote administration, certain aspects of the assessment process were impacted, such as access to direct patient observation, and maintaining an environment conducive to testing. As such, external factors have the potential to affect the " validity of data collected.     TESTS ADMINISTERED:  CNS Vital Signs Neurocognitive Battery  Carlos Adult ADHD Rating Scale-IV: Self and Other Reports (BAARS-IV)  Carlos Functional Impairment Scale: Self and Other Reports (BFIS)  Carlos Deficits in Executive Functioning Scale (BDEFS)  Generalized Anxiety Disorder Questionnaire (SHERRI-7)  Patient Health Questionnaire- 9 (PHQ-9)  [place] Multiphasic Personality Inventory - 3 (MMPI - 3)      BEHAVIORAL OBSERVATIONS: The patient was pleasant and cooperative throughout all interview and explanation of testing process. The patient was oriented to person, place, and time. Mood was neutral. Eye contact was adequate and speech was at normal rate and rhythm. Motor activity was appropriate. Due to virtual/remote administration, direct patient observation was not possible during the testing process, and it is unknown if the patient was able to maintain an environment conducive to testing. As such, external factors have the potential to affect the validity of data collected.     TEST RESULTS: Test results comprise a hypothesis of the patient's mental health concerns and are not an independent or conclusive assessment. Test results are combined with the patient's available medical, psychological, behavioral, and observational data for an integrated interpretation and report.     CNS Vital Signs Neurocognitive Battery  The CNS Vital Signs Neurocognitive Battery is a remotely-administered assessment comprised of seven core subtests to individually measure the patient's verbal memory, visual memory, motor speed, psychomotor speed, reaction time, focus, ability to sustain attention and ability to adapt to changing rules and tasks.       Above average domain scores indicate a standard score (SS) greater than 109 or a Percentile Rank (HI) greater than 74, indicating a high functioning test subject. Average is a SS  or HI 25-74, indicating normal function. Low Average is a SS  80-89 or SD 9-24 indicating a slight deficit or impairment. Below Average is a SS 70-79 or SD 2-8, indicating a moderate level of deficit or impairment. Very Low is a SS less than 70 or a SD less than 2, indicating a deficit and impairment.  Validity Indicator denotes a guideline for representing the possibility of an invalid test or domain score, and can be influenced by patient understanding, effort, or other conditions.    The patient's results are detailed below:     Domain Standard Score Percentile Description Validity   Neurocognitive Index 111 77 Above Average Yes   Composite Memory Measure 117 87 Above Average Yes   Verbal Memory 109 73 Average Yes   Visual Memory 119 90 Above Average Yes   Psychomotor Speed 121 92 Above Average Yes   Reaction Time 93 32 Average Yes   Complex Attention 113 81 Above Average Yes   Cognitive Flexibility 111 77 Above Average Yes   Processing Speed 117 87 Above Average Yes   Executive Function 110 75 Above Average Yes   Reasoning 99 47 Average Yes   Working Memory 118 88 Above Average Yes   Sustained Attention  119 90 Above Average Yes   Simple Attention 107 68 Average Yes   Motor Speed 117 87  Above Average Yes      Neurocognitive Index (NCI): Measures an average score derived from the domain scores or a general assessment of the overall neurocognitive status of the patient. The patient's NCI score is 111, with a percentile of 77, and falls within the above average range.     Composite Memory: Measures how well subject can recognize, remember, and retrieve words and geometric figures, and is comprised of the Visual and Verbal Memory domains. The patient's Composite Memory score is 117, with a percentile of 87, and falls within the above average range.     Verbal Memory: Measures how well subject can recognize, remember, and retrieve words. The patient's Verbal Memory score is 109, with a percentile of 73, and falls within the average range.     Visual Memory: Measures how well  subject can recognize, remember and retrieve geometric figures. The patient's Visual Memory score is 119, with a percentile of 90, and falls within the above average range.     Psychomotor Speed: Measures how well a subject perceives, attends, responds to complex visual-perceptual information and performs simple fine motor coordination, and is comprised of the Motor Speed and Processing Speed indexes. The patient's Psychomotor Speed score is 121, with a percentile of 92, and falls within the above average range.     Reaction Time: Measures how quickly the subject can react, in milliseconds, to a simple and increasingly complex direction set. The patient's Reaction Time score is 93, with a percentile of 32, and falls within the average range.     Complex Attention: Measures the ability to track and respond to a variety of stimuli over lengthy periods of time and/or perform complex mental tasks requiring vigilance quickly and accurately. The patient's Complex Attention score is 113, with a percentile of 81, and falls within the above average range.     Cognitive Flexibility: Measures how well subject is able to adapt to rapidly changing and increasingly complex set of directions and/or to manipulate the information. The patient's Cognitive Flexibility score is 111, with a percentile of 77, and falls within the above average range.     Processing Speed: Measures how well a subject recognizes and processes information i.e., perceiving, attending/responding to incoming information, motor speed, fine motor coordination, and visual-perceptual ability. The patient's Processing Speed score is 117, with a percentile of 87, and falls within the above average range.     Executive Function: Measures how well a subject recognizes rules, categories, and manages or navigates rapid decision making. The patient's Executive Function score is 110, with a percentile of 75, and falls within the above average range.     Reasoning:  Measures how well a subject can perceive and understand the meaning of visual or abstract information and recognizing relationships between visual-abstract concepts. The patient's Reasoning score is 99, with a percentile of 47, and falls in the average range.      Working Memory: Measures how well a subject can perceive and attend to symbols using short-term memory processes. Also measures the ability to carry out short-term memory tasks that support decision making, problem solving, planning, and execution. The patient's Working Memory score is 118, with a percentile of 88, and falls in the above average range.     Sustained Attention: Measures how well a subject can direct and focus cognitive activity on specific stimuli. Also measurs how well a subject can focus and complete task or activity, sequence action, and focus during complex thought. The patient's Sustained Attention score is 119, with a percentile of 90, and falls in the above average range.     Simple Attention: Measures the ability to track and respond to a single defined stimulus over lengthy periods of time while performing vigilance and response inhibition quickly and accurately to a simple task. The patient's Simple Attention score is 107, with a percentile of 68, and falls within the average range.     Motor Speed: Measure: Ability to perform simple movements to produce and satisfy an intention towards a manual action and goal. The patient's Motor Speed score is 117, with a percentile of 87, and falls within the above average range.     Carlos Adult ADHD Rating Scale-IV: Self and Other Reports (BAARS-IV)  The BAARS-IV assesses for symptoms of ADHD that are experienced in one's daily life. This assessment measure includes self and collateral rating scales designed to provide information regarding current and childhood symptoms of ADHD including inattention, hyperactivity, and impulsivity. Self-report scores are reported as percentiles. Scores at  "the 76th-83rd percentile are considered marginal, scores at the 84th-92nd percentile are considered borderline, scores at the 93rd-95th percentile are considered mild, scores at the 96th-98th percentile are considered moderate, and those at the 99th percentile are considered severe. Collateral or \"other\" rating scales are reported as number of symptoms observed in comparison to those reported by the client. Norms and percentile scores are not available for collateral reports.      Current Symptoms Scale--Self Report:   Client completed the self-report inventory of current symptoms. The results indicate that the client's Total ADHD Score was 39 which places the patient in the 93 percentile for overall ADHD symptoms. In addition, the client endorsed 3/9 (92 percentile) Inattention symptoms, 2/9 (88 percentile) Hyperactivity-Impulsivity symptoms, and 3/9 (88 percentile) Sluggish Cognitive Tempo symptoms. Client indicated that the reported symptoms have resulted in impaired functioning in home and work. Overall, the results suggest the client is experiencing borderline ADHD symptoms.      Current Symptoms Scale--Other Report:  Client's spouse completed the collateral report inventory of current symptoms. Based on the collateral contact's observation of symptoms, the client demonstrates 0/9 Inattention symptoms, 0/5 Hyperactivity symptoms, 0/4 Impulsivity symptoms, and 0/9 Sluggish Cognitive Tempo symptoms. The client's Total ADHD Score was 21. The collateral contact indicated the client does not demonstrate impaired functioning in any of the indicated domains. The collateral- and self-report scores are significantly different.     Childhood Symptoms Scale--Self-Report:  Client completed the self-report inventory of childhood symptoms. The results indicate that the client's Total ADHD Score was 25 which places the patient in the (51 - 75 percentile) for overall ADHD symptoms in childhood. In addition, the client endorsed " "0/9 (1 - 75 percentile) Inattention symptoms and 0/9 (1 - 75 percentile) Hyperactivity-Impulsivity symptoms. Client indicated that the reported symptoms did not result in impaired functioning in the indicated domains. Overall, the results suggest the client experienced an absence symptoms of ADHD as a child.      Childhood Symptoms Scale--Other Report:  Client's mother completed the collateral report inventory of childhood symptoms. Based on the collateral contact's recollection of client's childhood symptoms, the client demonstrated 0/9 Inattention symptoms and 0/9 Hyperactivity-Impulsivity symptoms. The client's Total ADHD Score was 27. The collateral contact indicated the client demonstrates the absence of impaired functioning in the indicated domains. The collateral- and self-report scores are not significantly different.                           Carlos Functional Impairment Scale: Self and Other Reports (BFIS)  The BFIS is used to assess an individuals' psychosocial impairment in major life/daily activities that may be due to a mental health disorder. This assessment measure includes self and collateral rating scales. Self-report scores are reported as percentiles. Scores at the 76th-83rd percentile are considered marginal, scores at the 84th-92nd percentile are considered borderline, scores at the 93rd-95th percentile are considered mild, scores at the 96th-98th percentile are considered moderate, and those at the 99th percentile are considered severe. Collateral or \"other\" rating scales are reported as number of symptoms observed in comparison to those reported by the client. Norms and percentile scores are not available for collateral reports.      Results indicate the client did not identify impairment in any areas indicated (scores at or greater than 93rd percentile). The client's Mean Impairment Score was 2.25 (51 - 75 percentile) indicating the client is reporting minimal impairment in functioning " "across domains. Client's spouse completed the collateral rating scale, which indicated similar results. The collateral contact's scores were generally slightly higher than the client's report.     Carlos Deficits in Executive Functioning Scale (BDEFS)  The BDEFS is a measure used for evaluating dimensions of adult executive functioning in daily life. This assessment measure includes self and collateral rating scales. Self-report scores are reported as percentiles. Scores at the 76th-83rd percentile are considered marginal, scores at the 84th-92nd percentile are considered borderline, scores at the 93rd-95th percentile are considered mild, scores at the 96th-98th percentile are considered moderate, and those at the 99th percentile are considered severe. Collateral or \"other\" rating scales are reported as number of symptoms observed in comparison to those reported by the client. Norms and percentile scores are not available for collateral reports.      Results indicate the client's Total Executive Functioning Score was 154 (51 - 75 percentile). The ADHD-Executive Functioning Index score was 21 (77 percentile). These scores suggest the client has marginal deficits in executive functioning. Results indicate the client identified significant deficits in the following areas: self-management to time (severe deficits), self-organization/problem-solving (marginal deficits), self-restraint (marginal deficits), self-motivation (marginal deficits) and self-regulation of emotions (marginal deficits). Client's spouse completed the collateral rating scale, which indicated similar results. The collateral contact's scores were generally lower than the client's report.     Generalized Anxiety Disorder Questionnaire (SHERRI-7)  This questionnaire is designed to assess for anxiety in adults. Based on the score, the patient is experiencing mild symptoms of anxiety. Client identified the following symptoms of anxiety: feeling on " edge/nervous/anxious, trouble relaxing, being restless, and feeling something awful might happen.     Patient Health Questionnaire- 9 (PHQ-9)   This questionnaire is designed to assess for depression in adults. Based on the score, the patient is experiencing mild symptoms of depression. Client identified the following symptoms of depression: depressed mood, lack of interest, difficulty with sleep, feeling tired or having little energy, and poor appetite or overeating.      [place] Multiphasic Personality Inventory - 3 (MMPI-3)    The MMPI-3 was administered to evaluate current level of emotional distress. Validity profile indicates that the patient appears to have answered in a generally straightforward and consistent manner, and obtained results are considered to be reliable and valid in representing current psychological status. No items were omitted.       There are no indications of somatic or cognitive dysfunction in this protocol. This patient may struggle with irritability, with the presence of stress, and worry. However, this patient is hopeful that she will be able to improve her situation or symptoms. Her fear may restrict her behavior, and she may not do things she wants to do because of the internal experience of fear or significant feelings of overwhelm. Additionally, a long lasting trait indicating negative emotionality, or neuroticism is present for this patient, but is also found in individuals in academia, who tend to question using logic, curiosity and longing for deeper meaning. No indications of distorted thinking were present in this profile. No behavioral concerns are indicated, however, this patient likely experiences low self confidence, and has a tendency to put trust in others, or think the best of other's and their intentions. This patient shows good impulse control, with low likelihood of impulsivity indicated in this profile. The patient experiences slightly lower energy, of feeling  cognitively slowed down at times. Regarding  interpersonal functioning Meredith may be submissive in social situations, but does not tend towards introversion or extroversion. It is likely that this patient would like to interact with others more than she currently does but anxiety or the sense of internal criticism, and anxiety gets in her way. She is aware of how her behavior affects others and may think at length about what actions she takes so to not let others down as she likely deeply cares for others.     SUMMARY: Presenting patient Meredith Mello is a 33 year old, White, cis- gender female, who completed psychological testing remotely/virtually due to the COVID-19 pandemic. Testing was requested to provide updated diagnostic clarification and necessary treatment recommendations and patient was referred by her primary care provider, Didi Harvey NP.     Patient first completed a diagnostic interview in which mental health symptoms, ADHD symptoms, and background information was gathered. Patient self-reported making careless mistakes/problems attending to details, difficulty sustaining attention, problems listening when spoken to directly, not following through with tasks, difficulty organizing, avoiding tasks that require sustained mental effort, losing things often, being easily distracted, and being forgetful. Patient did not self endorse hyperactivity or impulsivity assessments at the time of the diagnostic interview. However, patient indicated at the initial interview, that  symptoms of inattention and indicated that her abilities to perform work related tasks and cores are significantly impaired. Further, her self-reported symptoms on Carlos measures of ADHD symptoms were consistent with this information. Both her spouse and mother reported to observe an absence of significant symptoms in her currently and as a child.      An objective measure of personality indicated an absence of somatic or  cognitive dysfunction. The patient struggles with anxiety, and irritability, especially when faced with complex tasks. However, the patient is hopeful in her ability to improve her situation. She does not tend towards introversion or extroversion, yet she seems to keep to herself, and may want to put herself out there more in social situations than she does currently. While she does not struggle with a thought disorder, she lacks confidence at times. Additionally, a long lasting trait of neuroticism is present for her, which is more common in those in academia, that often question the truth, and strive to understand deeper meaning versus surface observations. In relationships, she may tend towards over-trusting others, and may not see other's flaws or may overlook other's wrongdoing. The patient experiences low energy at times. She likely puts a great deal into her actions, and may fear she will let others down because of her deep empathy for others.      An objective measure of neurocognitive functioning indicated that the patient was able to recognize target words from a word list immediately in the average range and in the average range after a delay. Visual memory score to be in the overall above average range. She was able to recognize target shapes immediately in the above average range and in the average range after a delay. Reasoning scored to be in the average range, as the patient was able to solve nonverbal puzzle matrices with equivocal efficiently as peers. Processing speed score to be in the above average range, as she was able to scan and respond to visual stimuli more efficiently comparable to peers.      On the Stroop test, the patient was able to inhibit the salient, but incorrect, response in the average range. She responded to stimuli in the average range with slight delay in reaction time in absence of omission errors, as a result, cognitive flexibility and executive functioning were scored to  be in the above average range on this portion of the test. Complex attention was scored to be in the above average range.      On a test of continuous performance, the patient was able to monroe her attention and resist making impulsive mistakes in the average range; simple attention scored to be in the average range. On a more complex continuous performance test that included one-back and two-back components, the patient was able to sustain her attention across time and hold information in mind for short-term manipulation in the above average range suggesting above average ability in working memory.      Referral Question Response: DSM-5 criteria for ADHD:   A. Symptom Count - Are there sufficient symptoms for the diagnosis? No, patient did not endorse sufficient symptoms.   B. Onset - Were several symptoms present before 13 years of age? No, symptoms reportedly began at age 21.   C. Pervasiveness - Are several symptoms present in at least two settings? Yes, patient reported that symptoms are problematic at home, school, and work.   D. Impairment - Do symptoms interfere with or reduce the quality of functioning? Yes, patient self reported difficulty with work, and home tasks, and time management across environments.   E. Exclusions - Are symptoms better explained by another disorder or factor? Yes, symptoms are better explained by anxiety and depression symptoms along with chronic pain and possibly the related side effects of medication. Difficulties are not explained by an organic basis of inattention.      The patient meets the following DSM-5 criteria for generalized anxiety disorder:  A. Excessive anxiety and worry, occurring more days than not for at least 6 months about a number of events or activities.   B. The individual finds it difficult to control the worry.  C. The anxiety and worry are associated with 3 or more of 6 symptoms.  D. The anxiety, worry, or physical symptoms cause clinically significant  distress or impairment in social, occupational, or other important areas of functioning.  E. The disturbance is not attributable to the physiological effects of a substance (e.g., a drug of abuse, a medication) or another medical condition (e.g., hyperthyroidism).  F. The disturbance is not better explained by another mental disorder.     While a diagnosis of major depression was considered, it is likely that family/systemic concerns resulting from home environment in childhood, along with medical condition (headache), lack of quality sleep due to anxiety and excessive worry are the primary contributing factors that make time management, and concentration difficult across environments for this patient.      DIAGNOSES:  F41.1 Generalized Anxiety Disorder     PLAN OF CARE:  Follow up with your primary care provider, and specialists including neurology to address factors that could contribute to cognitive slowing, including but not limited to difficulty sleeping, vitamin and hormone levels, and chronic pain.      Consider increasing the frequency of individual psychotherapy to help alleviate anxiety symptoms. Patient indicated that she has addressed family and other systemic factors that have contributed to relational distress for her. However, she indicated that the exploration of the deeper impact on her sense of self is something that she has not addressed in therapy, and this is strongly recommended.   Psychotherapists that are trained in psychodynamic treatment, (short term or long term models) would be able to assist in exploration of deeper impact of adversity in childhood, and it's affects on the relationship with the self.   Research indicates that outcomes are best with both medication and therapy.      RECOMMENDATIONS:  Due to the patient's reported attention, concentration, anxiety, and mood difficulties, the following health/lifestyle changes when combined, can significantly improve symptoms:   Avoid  simple carbohydrates at breakfast. Aim for only complex carbohydrates and lean protein for your morning meal.   Engage in aerobic exercise 3 times per week for 30 minutes, ensuring that your heart rate stays within your training zone. Further, reading the book,  Spark,  by Taiwo Hernandez M.D can help the patient understand the benefits of exercise on the brain.   Research suggest that taking a high-quality multi-vitamin and antioxidant (1/2 cup of blueberries) daily in conjunction with balanced nutrition can be helpful.  Aim for the high end of daily water intake: around 72 ounces per day.  Ensure regular meals and snacks to maintain optimal attention.     The following may be beneficial in managing some of the patient's attention and concentration difficulties:  Due to the patient's difficulties with attention and concentration, consider working in a completely distraction-free area while completing tasks. Workspaces should be completely clear except for the materials needed for the current task. Both visual and auditory distractions should be decreased as much as possible.  Considering decreased ability to focus and maintain attention, it is recommended that the patient take frequent breaks while completing tasks. This will help to maintain attention and effort. The patient may benefit from the use of a Envia LÃ¡ Timer. The timer works by using built-in break times. After working on a task consistently for 25 minutes, the timer reminds the user to take a five-minute break before continuing, etc. A Envia LÃ¡ timer can be downloaded as a free liana to a phone or tablet.     Avoid multitasking. Attempting to work on multiple tasks and projects the same increases the likelihood that an error will occur. Focus on one task at a time.     Due to the patient's difficulties with sleep, it recommended to engage in a relaxing activity up to the point of sleep. The patient may want to spend time reading, drawing/ coloring,  practicing mindfulness, listening (not watching) to podcasts, music, etc., or try the AGNITiO liana, which is free to download and may aid falling asleep more easily.     The patient may benefit from engaging in mindfulness practices. This may include breathing techniques, apps that provide guided meditation, or more interactive activities such as coloring.        JEANNINE Reveles (doctoral practicum student)     Note was reviewed and clinical supervision provided by Sailaja Vazquez.D, LP  April 29, 2024

## 2024-07-16 ENCOUNTER — OFFICE VISIT (OUTPATIENT)
Dept: PHYSICAL MEDICINE AND REHAB | Facility: CLINIC | Age: 34
End: 2024-07-16
Payer: COMMERCIAL

## 2024-07-16 VITALS
OXYGEN SATURATION: 98 % | DIASTOLIC BLOOD PRESSURE: 78 MMHG | RESPIRATION RATE: 16 BRPM | SYSTOLIC BLOOD PRESSURE: 121 MMHG | HEART RATE: 85 BPM

## 2024-07-16 DIAGNOSIS — G43.719 INTRACTABLE CHRONIC MIGRAINE WITHOUT AURA AND WITHOUT STATUS MIGRAINOSUS: Primary | ICD-10-CM

## 2024-07-16 PROCEDURE — 64615 CHEMODENERV MUSC MIGRAINE: CPT | Mod: GC | Performed by: PHYSICAL MEDICINE & REHABILITATION

## 2024-07-16 PROCEDURE — 95874 GUIDE NERV DESTR NEEDLE EMG: CPT | Mod: GC | Performed by: PHYSICAL MEDICINE & REHABILITATION

## 2024-07-16 RX ORDER — BUPIVACAINE HYDROCHLORIDE 5 MG/ML
2 INJECTION, SOLUTION EPIDURAL; INTRACAUDAL ONCE
Status: COMPLETED | OUTPATIENT
Start: 2024-07-16 | End: 2024-07-16

## 2024-07-16 RX ADMIN — BUPIVACAINE HYDROCHLORIDE 10 MG: 5 INJECTION, SOLUTION EPIDURAL; INTRACAUDAL at 16:46

## 2024-07-16 ASSESSMENT — PAIN SCALES - GENERAL: PAINLEVEL: NO PAIN (0)

## 2024-07-16 NOTE — LETTER
7/16/2024       RE: Meredith Mello  [address] [place] [address]     Dear Colleague,    Thank you for referring your patient, Meredith Mello, to the [facility] [place] at [facility]. Please see a copy of my visit note below.    MIGRAINE HEADACHE BOTOX CLINIC NOTE    PHYSICAL EXAM:  She does not have any headache now.         HPI:  Meredith Mello is a 34 year old female who complains of headaches since childhood, worsening in the past 3 years.     Current Headache Pattern:                   Frequency (How many headache days per month?):  2 per week                 Duration of Headache:  2-3 days                 Aura: no aura                 Associated Symptoms:  nausea, light sensitivity, sound sensitivity, and blocked nostril, ear pain     Migraine Complications: no                                        Description of Headache Pain & Location:  throbbing or dull pain,  left eye and left temporal area                             Level of Pain during usual or 'typical' headache:  4/10                             Level of Pain during the worst headache:  9-10/10     Do headaches interfere with or prevent usual activities or diminish patient's productivity at home or work?  Yes - when it is severe     Non-Pharmacologic Treatments Tried:  (such as food trigger elimination diets, biofeedback, relaxation, physical therapy, chiropractic, etc.)  She has tried running (made it worst), heat also made it worst, cold packs not very helpful, feet in hot water with cold pack in neck not helpful, yoga and park-chi did not help either. She has not tried daily specific stretches for migraine  Wrapping head with a pillow and trying to sleep     Medications Tried:    Rescue Meds: rimegepant  Nurtec  NSAIDs: Ibuprofen   Triptan's: Maxalt / Maxalt MLT (Rizatriptan) PO  Adjunctive Therapy: no  Prophylactic Drug Treatment:  topiramate, amitriptilinemk    She denies a history of recent head injury.       Patient denies new medical diagnoses, illnesses, hospitalizations, emergency room visits, and injuries since the previous injection with botulinum neurotoxin.     We reviewed the recommended safety guidelines for  Botox from any vaccine injection, such as the seasonal flu vaccine, by a minimum of 10-14 days with Meredith Mello. She acknowledged understanding.    Today  the patient reports that:  Had a headache   The headaches are 4-5 per week, 20 days a month for the last 2 months. 3-4 out of those 20 headaches are migraine. States to have had increasing nausea (not usual), light sensitivity. The patient missed an injection        RESPONSE TO PREVIOUS TREATMENT:  This will be her first set of injections        BOTULINUM NEUROTOXIN INJECTION PROCEDURES:  VERIFICATION OF PATIENT IDENTIFICATION AND PROCEDURE       Initials   Patient Name fi   Patient  fi   Procedure Verified by: melissa      Prior to the start of the procedure and with procedural staff participation, I verbally confirmed the patient s identity using two indicators, relevant allergies, that the procedure was appropriate and matched the consent or emergent situation, and that the correct equipment/implants were available. Immediately prior to starting the procedure I conducted the Time Out with the procedural staff and re-confirmed the patient s name, procedure, and site/side. (The Joint Commission universal protocol was followed.)  Yes     Sedation (Moderate or Deep): None     INDICATIONS FOR PROCEDURES:  Meredith Mello is a 33 year old patient with chronic migraine headaches associated with cervicogenic and oromandibular  components.      Her baseline symptoms have been recalcitrant to oral medications and conservative therapy. Meredith is here today for injection with Botox for the first time     Above assessments performed by:  Patient seen and evaluated with  attending physician Dr. Sandoval. The attending provider was present for the entire procedure documented.    Simone Wall MD   Resident Physician PGY-3  Physical Medicine and Rehabilitation  07/16/2024 3:49 PM          GOAL OF PROCEDURE:  The goal of this procedure is to decrease pain  and enhance functional independence.     TOTAL DOSE ADMINISTERED:  Dose Administered:  155 units  Botox (Botulinum Toxin Type A)       2:1 Dilution      Diluent Used:  Preservative Free Normal Saline  Total Volume of Diluent Used:  3.8 ml  Lot #  [id number] with Expiration Date:  08/2020  NDC #: Botox 100u ([address]-96)     Medication guide was offered to patient and was declined.     CONSENT:  The risks, benefits, and treatment options were discussed with Meredith Mello and she agreed to proceed.     Written consent was obtained by PS.      EQUIPMENT USED:  Needle-25mm stimulating/recording  EMG  30 gauge needle     SKIN PREPARATION:  Skin preparation was performed using an alcohol wipe.     GUIDANCE DESCRIPTION:  Electro-myographic guidance was necessary throughout the posterior neck portion to accurately identify all areas of spastic muscles while avoiding injection of non-spastic muscles, neighboring nerves and nearby vascular structures.      AREA/MUSCLE INJECTED:    1 & 2. SHOULDER GIRDLE & NECK MUSCLES: 50 units Botox = Total Dose, 2:1 Dilution      Muscle Injected  Units administered  Number of sites    Right lateral upper Trapezius 15 units 3   Left lateral upper Trapezius 15 units 3   Right Cervical Paraspinals  10 units 2   Left Cervical Paraspinals  10 units 2   Right Occipitalis 15 unuts 3   Left Occipitalis 15 units 3   Right Temporalis 20 unuts 4   Left Temporalis 20 units 4   Right Frontalis 10 units 2   Left Frontalis 10 units 2   Right   5 units 2   Right   5 units 2   Procerus  5 units 1        Total dose administered  155    Unavoidable waste 45    Total dose Billed  200                RESPONSE TO PROCEDURE:  Meredith Mello tolerated the procedure well and there were no immediate complications.   She was allowed to recover for an appropriate period of time and was discharged home in stable condition.     FOLLOW UP:  Meredith Mello was asked to follow up by phone in 7-14 days with Anuja Kumar RN, Care Coordinator, to report her response to this series of injections.  Based on the patient's previous response to this therapy, Meredith Mello was rescheduled for the next series of injections in 12 weeks.     PLAN (Medication Changes, Therapy Orders, Work or Disability Issues, etc.): Patient will continue to monitor response to today's injections.       Patient seen and discussed with my attending Dr Lori Wall MD   Resident Physician PGY-2  Physical Medicine and Rehabilitation  07/16/2024 4:01 PM       Attestation signed by Candi Sandoval MD at 7/16/2024  4:39 PM:  Patient seen and examined with the resident   Discussed with the patient   She missed her last appointment and was concerned about her $500 per visit she is being charged out of pocket.   Discussed the need for q 12 weeks injections for optimization of treatment.     I was present throughout the procedure         Again, thank you for allowing me to participate in the care of your patient.      Sincerely,    Candi Sandoval MD

## 2024-07-16 NOTE — PROGRESS NOTES
MIGRAINE HEADACHE BOTOX CLINIC NOTE    PHYSICAL EXAM:  She does not have any headache now.         HPI:  Meredith Mello is a 34 year old female who complains of headaches since childhood, worsening in the past 3 years.     Current Headache Pattern:                   Frequency (How many headache days per month?):  2 per week                 Duration of Headache:  2-3 days                 Aura: no aura                 Associated Symptoms:  nausea, light sensitivity, sound sensitivity, and blocked nostril, ear pain     Migraine Complications: no                                        Description of Headache Pain & Location:  throbbing or dull pain,  left eye and left temporal area                             Level of Pain during usual or 'typical' headache:  4/10                             Level of Pain during the worst headache:  9-10/10     Do headaches interfere with or prevent usual activities or diminish patient's productivity at home or work?  Yes - when it is severe     Non-Pharmacologic Treatments Tried:  (such as food trigger elimination diets, biofeedback, relaxation, physical therapy, chiropractic, etc.)  She has tried running (made it worst), heat also made it worst, cold packs not very helpful, feet in hot water with cold pack in neck not helpful, yoga and park-chi did not help either. She has not tried daily specific stretches for migraine  Wrapping head with a pillow and trying to sleep     Medications Tried:    Rescue Meds: rimegepant  Nurtec  NSAIDs: Ibuprofen   Triptan's: Maxalt / Maxalt MLT (Rizatriptan) PO  Adjunctive Therapy: no  Prophylactic Drug Treatment: topiramate, amitriptilinemk    She denies a history of recent head injury.       Patient denies new medical diagnoses, illnesses, hospitalizations, emergency room visits, and injuries since the previous injection with botulinum neurotoxin.     We reviewed the recommended safety guidelines for  Botox from any vaccine injection, such  as the seasonal flu vaccine, by a minimum of 10-14 days with Meredith Mello. She acknowledged understanding.    Today  the patient reports that:  Had a headache   The headaches are 4-5 per week, 20 days a month for the last 2 months. 3-4 out of those 20 headaches are migraine. States to have had increasing nausea (not usual), light sensitivity. The patient missed an injection        RESPONSE TO PREVIOUS TREATMENT:  This will be her first set of injections        BOTULINUM NEUROTOXIN INJECTION PROCEDURES:  VERIFICATION OF PATIENT IDENTIFICATION AND PROCEDURE       Initials   Patient Name fi   Patient  fi   Procedure Verified by: melissa      Prior to the start of the procedure and with procedural staff participation, I verbally confirmed the patient s identity using two indicators, relevant allergies, that the procedure was appropriate and matched the consent or emergent situation, and that the correct equipment/implants were available. Immediately prior to starting the procedure I conducted the Time Out with the procedural staff and re-confirmed the patient s name, procedure, and site/side. (The Joint Commission universal protocol was followed.)  Yes     Sedation (Moderate or Deep): None     INDICATIONS FOR PROCEDURES:  Meredith Mello is a 33 year old patient with chronic migraine headaches associated with cervicogenic and oromandibular  components.      Her baseline symptoms have been recalcitrant to oral medications and conservative therapy. Meredith is here today for injection with Botox for the first time     Above assessments performed by:  Patient seen and evaluated with attending physician Dr. Sandoval. The attending provider was present for the entire procedure documented.    Simone Wall MD   Resident Physician PGY-3  Physical Medicine and Rehabilitation  2024 3:49 PM          GOAL OF PROCEDURE:  The goal of this procedure is to decrease pain  and enhance functional independence.      TOTAL DOSE ADMINISTERED:  Dose Administered:  155 units  Botox (Botulinum Toxin Type A)       2:1 Dilution      Diluent Used:  Preservative Free Normal Saline  Total Volume of Diluent Used:  3.8 ml  Lot #  [id number] with Expiration Date:  08/2020  NDC #: Botox 100u ([address]-07)     Medication guide was offered to patient and was declined.     CONSENT:  The risks, benefits, and treatment options were discussed with Meredith Mello and she agreed to proceed.     Written consent was obtained by PS.      EQUIPMENT USED:  Needle-25mm stimulating/recording  EMG  30 gauge needle     SKIN PREPARATION:  Skin preparation was performed using an alcohol wipe.     GUIDANCE DESCRIPTION:  Electro-myographic guidance was necessary throughout the posterior neck portion to accurately identify all areas of spastic muscles while avoiding injection of non-spastic muscles, neighboring nerves and nearby vascular structures.      AREA/MUSCLE INJECTED:    1 & 2. SHOULDER GIRDLE & NECK MUSCLES: 50 units Botox = Total Dose, 2:1 Dilution      Muscle Injected  Units administered  Number of sites    Right lateral upper Trapezius 15 units 3   Left lateral upper Trapezius 15 units 3   Right Cervical Paraspinals  10 units 2   Left Cervical Paraspinals  10 units 2   Right Occipitalis 15 unuts 3   Left Occipitalis 15 units 3   Right Temporalis 20 unuts 4   Left Temporalis 20 units 4   Right Frontalis 10 units 2   Left Frontalis 10 units 2   Right   5 units 2   Right   5 units 2   Procerus  5 units 1        Total dose administered  155    Unavoidable waste 45    Total dose Billed  200               RESPONSE TO PROCEDURE:  Meredith Mello tolerated the procedure well and there were no immediate complications.   She was allowed to recover for an appropriate period of time and was discharged home in stable condition.     FOLLOW UP:  Meredith Mello was asked to follow up by phone in 7-14 days with Anuja Kumar RN, Care  Coordinator, to report her response to this series of injections.  Based on the patient's previous response to this therapy, Meredith Mello was rescheduled for the next series of injections in 12 weeks.     PLAN (Medication Changes, Therapy Orders, Work or Disability Issues, etc.): Patient will continue to monitor response to today's injections.       Patient seen and discussed with my attending Dr Lori Wall MD   Resident Physician PGY-2  Physical Medicine and Rehabilitation  07/16/2024 4:01 PM

## 2024-08-12 ENCOUNTER — VIRTUAL VISIT (OUTPATIENT)
Dept: FAMILY MEDICINE | Facility: CLINIC | Age: 34
End: 2024-08-12
Payer: COMMERCIAL

## 2024-08-12 DIAGNOSIS — G43.709 CHRONIC MIGRAINE WITHOUT AURA WITHOUT STATUS MIGRAINOSUS, NOT INTRACTABLE: Primary | ICD-10-CM

## 2024-08-12 DIAGNOSIS — F41.1 GAD (GENERALIZED ANXIETY DISORDER): ICD-10-CM

## 2024-08-12 PROCEDURE — G2211 COMPLEX E/M VISIT ADD ON: HCPCS | Mod: 95 | Performed by: NURSE PRACTITIONER

## 2024-08-12 PROCEDURE — 99214 OFFICE O/P EST MOD 30 MIN: CPT | Mod: 95 | Performed by: NURSE PRACTITIONER

## 2024-08-12 RX ORDER — ESCITALOPRAM OXALATE 10 MG/1
10 TABLET ORAL DAILY
Qty: 90 TABLET | Refills: 3 | Status: SHIPPED | OUTPATIENT
Start: 2024-08-12

## 2024-08-12 NOTE — PROGRESS NOTES
"Meredith is a 34 year old who is being evaluated via a billable video visit.    How would you like to obtain your AVS? MyChart  If the video visit is dropped, the invitation should be resent by: Text to cell phone: [phone]  Will anyone else be joining your video visit? No      Assessment & Plan     (G43.419) Chronic migraine without aura without status migrainosus, not intractable  (primary encounter diagnosis)  Comment:   Plan: Discussed trying to take 10 mg of rizatriptan as needed.  If not helpful, can switch to zolmitriptan.  Will continue with Botox to give it a full trial.  If migraines are improving, will then reaper off of Topamax and eventually Amitriptyline if needed.    We did discuss options of Propranolol, Verapamil, Nurtec for prophylaxis.      (F41.1) SHERRI (generalized anxiety disorder)  Comment: stable  Plan: escitalopram (LEXAPRO) 10 MG tablet        The current medical regimen is effective;  continue present plan and medications.       The longitudinal plan of care for the diagnosis(es)/condition(s) as documented were addressed during this visit. Due to the added complexity in care, I will continue to support Meredith in the subsequent management and with ongoing continuity of care.        BMI  Estimated body mass index is 30.3 kg/m  as calculated from the following:    Height as of 10/9/23: 1.664 m (5' 5.5\").    Weight as of 10/9/23: 83.9 kg (184 lb 14.4 oz).             Subjective   Meredith is a 34 year old, presenting for the following health issues:  No chief complaint on file.        8/12/2024     5:08 PM   Additional Questions   Roomed by Joselyn LOWERY MA   Accompanied by Self     History of Present Illness       Headaches:   Since the patient's last clinic visit, headaches are: no change  The patient is getting headaches:  1x per week  She is not able to do normal daily activities when she has a migraine.  The patient is taking the following rescue/relief medications:  Ibuprofen (Advil, Motrin) and " "other   Patient states \"The relief is inconsistent\" from the rescue/relief medications.   The patient is taking the following medications to prevent migraines:  Topomax and Amitriptyline  In the past 4 weeks, the patient has gone to an Urgent Care or Emergency Room 0 times times due to headaches.    She eats 4 or more servings of fruits and vegetables daily.She consumes 0 sweetened beverage(s) daily.She exercises with enough effort to increase her heart rate 10 to 19 minutes per day.  She exercises with enough effort to increase her heart rate 3 or less days per week.   She is taking medications regularly.     She had a migraine for 7 days straight after her first Botox injection.  She canceled her second one since she had a migraine and had another injection last month.  They are expensive and she is trying to decide if she wants to continue.    She is wondering if Topamax and Amitriptyline are doing anything and if she should try going off.  She does have some fatigue, it was worse when she was on Topamax BID.    Notes from neurology visit 7/23:  Her current frequency and severity of headaches warrant prevention.  - She has been using topiramate 50 mg twice daily but is experiencing bothersome side effects of paresthesias, fatigue, word finding difficulty.  I recommend decreasing her dose down to 50 mg at bedtime.  She can either continue with this dose, or discontinue medication.  - I recommend increasing amitriptyline dose up to 25 mg at bedtime.  Side effects reviewed.  Discussed that this dose could be further increased up to 50 mg at bedtime if needed and if tolerated.  - If amitriptyline and/or topiramate are not effective enough for headache prevention, could consider additional headache prophylaxis including propranolol, verapamil, CGRP inhibitors.  - She may also try magnesium and/or riboflavin supplementation if desired.  I recommend 400 mg of each daily.  - Reviewed recommended caffeine intake.  I " recommend no more than 200 mg of caffeine daily, though advised that should she wish to decrease caffeine intake that this should be done gradually to avoid worsening of headaches.                Objective           Vitals:  No vitals were obtained today due to virtual visit.    Physical Exam   GENERAL: alert and no distress  EYES: Eyes grossly normal to inspection.  No discharge or erythema, or obvious scleral/conjunctival abnormalities.  RESP: No audible wheeze, cough, or visible cyanosis.    SKIN: Visible skin clear. No significant rash, abnormal pigmentation or lesions.  NEURO: Cranial nerves grossly intact.  Mentation and speech appropriate for age.  PSYCH: Appropriate affect, tone, and pace of words          Video-Visit Details    Type of service:  Video Visit   Originating Location (pt. Location): Home    Distant Location (provider location):  On-site  Platform used for Video Visit: [employer]  Signed Electronically by: Didi Harvey NP

## 2024-09-09 ENCOUNTER — PATIENT OUTREACH (OUTPATIENT)
Dept: CARE COORDINATION | Facility: CLINIC | Age: 34
End: 2024-09-09
Payer: COMMERCIAL

## 2024-09-23 ENCOUNTER — PATIENT OUTREACH (OUTPATIENT)
Dept: CARE COORDINATION | Facility: CLINIC | Age: 34
End: 2024-09-23
Payer: COMMERCIAL

## 2024-09-26 DIAGNOSIS — G43.709 CHRONIC MIGRAINE WITHOUT AURA WITHOUT STATUS MIGRAINOSUS, NOT INTRACTABLE: ICD-10-CM

## 2024-09-26 RX ORDER — TOPIRAMATE 50 MG/1
50 TABLET, FILM COATED ORAL DAILY
Qty: 90 TABLET | Refills: 2 | Status: SHIPPED | OUTPATIENT
Start: 2024-09-26

## 2024-10-07 ENCOUNTER — OFFICE VISIT (OUTPATIENT)
Dept: FAMILY MEDICINE | Facility: CLINIC | Age: 34
End: 2024-10-07
Payer: COMMERCIAL

## 2024-10-07 VITALS
RESPIRATION RATE: 16 BRPM | BODY MASS INDEX: 31.02 KG/M2 | HEART RATE: 87 BPM | TEMPERATURE: 97.4 F | HEIGHT: 66 IN | SYSTOLIC BLOOD PRESSURE: 114 MMHG | OXYGEN SATURATION: 100 % | WEIGHT: 193 LBS | DIASTOLIC BLOOD PRESSURE: 79 MMHG

## 2024-10-07 DIAGNOSIS — N89.8 VAGINAL DISCHARGE: ICD-10-CM

## 2024-10-07 DIAGNOSIS — N76.0 BACTERIAL VAGINOSIS: ICD-10-CM

## 2024-10-07 DIAGNOSIS — R39.89 GENITAL SORE: Primary | ICD-10-CM

## 2024-10-07 DIAGNOSIS — B96.89 BACTERIAL VAGINOSIS: ICD-10-CM

## 2024-10-07 LAB
C TRACH DNA SPEC QL PROBE+SIG AMP: NEGATIVE
CLUE CELLS: PRESENT
HSV1 IGG SERPL QL IA: 0.1 INDEX
HSV1 IGG SERPL QL IA: NORMAL
HSV2 IGG SERPL QL IA: 0.07 INDEX
HSV2 IGG SERPL QL IA: NORMAL
N GONORRHOEA DNA SPEC QL NAA+PROBE: NEGATIVE
T PALLIDUM AB SER QL: NONREACTIVE
TRICHOMONAS, WET PREP: ABNORMAL
WBC'S/HIGH POWER FIELD, WET PREP: ABNORMAL
YEAST, WET PREP: ABNORMAL

## 2024-10-07 PROCEDURE — 36415 COLL VENOUS BLD VENIPUNCTURE: CPT

## 2024-10-07 PROCEDURE — 99214 OFFICE O/P EST MOD 30 MIN: CPT

## 2024-10-07 PROCEDURE — 86695 HERPES SIMPLEX TYPE 1 TEST: CPT

## 2024-10-07 PROCEDURE — 86696 HERPES SIMPLEX TYPE 2 TEST: CPT

## 2024-10-07 PROCEDURE — 87591 N.GONORRHOEAE DNA AMP PROB: CPT

## 2024-10-07 PROCEDURE — G2211 COMPLEX E/M VISIT ADD ON: HCPCS

## 2024-10-07 PROCEDURE — 87210 SMEAR WET MOUNT SALINE/INK: CPT

## 2024-10-07 PROCEDURE — 86780 TREPONEMA PALLIDUM: CPT

## 2024-10-07 PROCEDURE — 87491 CHLMYD TRACH DNA AMP PROBE: CPT

## 2024-10-07 PROCEDURE — 87529 HSV DNA AMP PROBE: CPT

## 2024-10-07 RX ORDER — METRONIDAZOLE 500 MG/1
500 TABLET ORAL 2 TIMES DAILY
Qty: 14 TABLET | Refills: 0 | Status: SHIPPED | OUTPATIENT
Start: 2024-10-07 | End: 2024-10-14

## 2024-10-07 RX ORDER — VALACYCLOVIR HYDROCHLORIDE 1 G/1
1000 TABLET, FILM COATED ORAL 2 TIMES DAILY
Qty: 20 TABLET | Refills: 0 | Status: SHIPPED | OUTPATIENT
Start: 2024-10-07 | End: 2024-11-06

## 2024-10-07 ASSESSMENT — PAIN SCALES - GENERAL: PAINLEVEL: NO PAIN (0)

## 2024-10-07 NOTE — PROGRESS NOTES
MIGRAINE HEADACHE NEUROTOXN CLINIC NOTE      ATTENDING'S NOTE   Patient seen and examined agree with the note below   I aas present throughout the procedure     Candi Sandoval MD, [facility]   Department of Rehabilitation       PHYSICAL EXAM:  She does not have any headache now.         HPI:  Meredith Mello is a 34 year old female who complains of headaches since childhood, worsening in the past 3 years.  Last seen 7/16/24 where she received 155 units of Botox.     Current Headache Pattern:                   Frequency (How many headache days per month?):  2 per week                 Duration of Headache:  2-3 days                 Aura: no aura                 Associated Symptoms:  nausea, light sensitivity, sound sensitivity, and blocked nostril, ear pain     Migraine Complications: no                                        Description of Headache Pain & Location:  throbbing or dull pain,  left eye and left temporal area                             Level of Pain during usual or 'typical' headache:  4/10                             Level of Pain during the worst headache:  9-10/10     Do headaches interfere with or prevent usual activities or diminish patient's productivity at home or work?  Yes - when it is severe     Non-Pharmacologic Treatments Tried:  (such as food trigger elimination diets, biofeedback, relaxation, physical therapy, chiropractic, etc.)  She has tried running (made it worst), heat also made it worst, cold packs not very helpful, feet in hot water with cold pack in neck not helpful, yoga and park-chi did not help either. She has not tried daily specific stretches for migraine  Wrapping head with a pillow and trying to sleep     Medications Tried:    Rescue Meds: rimegepant  Nurtec  NSAIDs: Ibuprofen   Triptan's: Maxalt / Maxalt MLT (Rizatriptan) PO  Adjunctive Therapy: no  Prophylactic Drug Treatment: topiramate, amitriptilinemk    She denies a history of recent head injury.       Patient denies new  medical diagnoses, illnesses, hospitalizations, emergency room visits, and injuries since the previous injection with botulinum neurotoxin.        RESPONSE TO PREVIOUS TREATMENT:  This will be her third  set of injections. She states that overall she continues to get relief, her migraines are still present during her injection cycle but states frequency and intensity have decreased since starting. She states that she will continue to trial these injections for a few more sessions prior to making a decision if they are significantly helping her. Of note, today she reports she has a migraine that is left sided behind her eye.        BOTULINUM NEUROTOXIN INJECTION PROCEDURES:  VERIFICATION OF PATIENT IDENTIFICATION AND PROCEDURE       Initials   Patient Name fi   Patient  fi   Procedure Verified by: fi      Prior to the start of the procedure and with procedural staff participation, I verbally confirmed the patient s identity using two indicators, relevant allergies, that the procedure was appropriate and matched the consent or emergent situation, and that the correct equipment/implants were available. Immediately prior to starting the procedure I conducted the Time Out with the procedural staff and re-confirmed the patient s name, procedure, and site/side. (The Joint Commission universal protocol was followed.)  Yes     Sedation (Moderate or Deep): None     INDICATIONS FOR PROCEDURES:  Meredith Mello is a 34 year old patient with chronic migraine headaches associated with cervicogenic and oromandibular  components.      Her baseline symptoms have been recalcitrant to oral medications and conservative therapy. Meredith is here today for injection with Botox for the first time     Above assessments performed by:  Patient seen and evaluated with attending physician Dr. Sandoval. The attending provider was present for the entire procedure documented.      Jose Angel Palma DO (PM&R PGY-4 Resident)  The attending provider  was present for the entire procedure documented below.         GOAL OF PROCEDURE:  The goal of this procedure is to decrease pain  and enhance functional independence.     TOTAL DOSE ADMINISTERED:  Dose Administered:  155 units  Botox (Botulinum Toxin Type A)       2:1 Dilution      Diluent Used:  Preservative Free Normal Saline  Total Volume of Diluent Used:  4 ml  Lot # see MAR for details   NDC #: Botox 100u ([address]-98)     Medication guide was offered to patient and was declined.     CONSENT:  The risks, benefits, and treatment options were discussed with Meredith Mello and she agreed to proceed.     Written consent was obtained by .      EQUIPMENT USED:  Needle-25mm stimulating/recording  EMG  30 gauge needle     SKIN PREPARATION:  Skin preparation was performed using an alcohol wipe.     GUIDANCE DESCRIPTION:  Electro-myographic guidance was necessary throughout the posterior neck portion to accurately identify all areas of spastic muscles while avoiding injection of non-spastic muscles, neighboring nerves and nearby vascular structures.      AREA/MUSCLE INJECTED:    1 & 2. SHOULDER GIRDLE & NECK MUSCLES: 50 units Botox = Total Dose, 2:1 Dilution      Muscle Injected  Units administered  Number of sites    Right lateral upper Trapezius 15 units 3   Left lateral upper Trapezius 15 units 3   Right Cervical Paraspinals  10 units 2   Left Cervical Paraspinals  10 units 2   Right Occipitalis 15 unuts 3   Left Occipitalis 15 units 3   Right Temporalis 20 unuts 4   Left Temporalis 20 units 4   Right Frontalis 10 units 2   Left Frontalis 10 units 2   Right   5 units 2   Right   5 units 2   Procerus  5 units 1        Total dose administered  155    Unavoidable waste 45    Total dose Billed  200               RESPONSE TO PROCEDURE:  Meredith Mello tolerated the procedure well and there were no immediate complications.   She was allowed to recover for an appropriate period of time and was  discharged home in stable condition.     FOLLOW UP:  Patient tolerated injections. She felt a little lightheaded at the end of procedure, rested with a cold pack on her forehead and was able to leave clinic with no issues.  Based on the patient's previous response to this therapy, Meredith Mello was rescheduled for the next series of injections in 12 weeks.     PLAN (Medication Changes, Therapy Orders, Work or Disability Issues, etc.): Patient will continue to monitor response to today's injections.       Patient seen and discussed with my attending Dr Lori Palma, DO  PGY-4 Physical Medicine & Rehabilitation

## 2024-10-07 NOTE — PROGRESS NOTES
Assessment & Plan     Genital sore  Left labia with spreading genital sores that are painful and appeared pus filled- now on exam appear more crusted.   Discussed HSV at length. We did do vaginal PCR swab today, was not able to get any fluid out of lesions so also doing HSV blood test and will interpret results based on clinical findings and communicate via [facility]. She is getting  in 4 days and having lots of stress, discussed risks vs benefits of starting treatment now which she is in favor of, sent in treatment for initial HSV with valtrex BID x 10 days.   Also testing for chlamydia, gonorrhea and syphilis to rule these out. Will follow-up with any abnormal results.   - Chlamydia trachomatis/Neisseria gonorrhoeae by PCR (vagina)  - Treponema Abs w Reflex to RPR and Titer  - Herpes Simplex Virus 1&2 by PCR (vagina)  - Wet preparation (vagina)  - Herpes Simplex Virus 1 and 2 IgG  - valACYclovir (VALTREX) 1000 mg tablet  Dispense: 20 tablet; Refill: 0    Vaginal discharge  Wet prep and chlamydia/gonorrhea pending.   - Chlamydia trachomatis/Neisseria gonorrhoeae by PCR (vagina)  - Wet preparation (vagina)      Payal Harper is a 34 year old, presenting for the following health issues:  No chief complaint on file.        10/7/2024     9:11 AM   Additional Questions   Roomed by Kassy Jhaveri     History of Present Illness       Reason for visit:  Suspected genital herpes  Symptom onset:  3-7 days ago  Symptoms include:  Rash outbreak on labia, swollen groin lymphnode, low grade headache last few days  Symptom intensity:  Mild  Symptom progression:  Staying the same  Had these symptoms before:  No She is missing 1 dose(s) of medications per week.  She is not taking prescribed medications regularly due to remembering to take.     First noticed sore on labia about 5 days ago  Left labial rash outbreak- thought at first it was a pimple, then it started growing at there were multiple bumps  Left side of groin  "discomfort, feels bruised. Getting worse, pretty painful and itching.   Has never had herpes that she knows of nor an exposure  She has been in monogamous relationship with her partner for 6 years, getting  in 4 days  Neither of them have had cold sores that they know of.     Some increased vaginal discharge (yellow mary grace), no dysuria, no hematuria.   Has Mirena IUD- no breakthrough bleeding.           Review of Systems  Constitutional, HEENT, cardiovascular, pulmonary, gi and gu systems are negative, except as otherwise noted.      Objective    /79 (BP Location: Right arm, Patient Position: Sitting, Cuff Size: Adult Regular)   Pulse 87   Temp 97.4  F (36.3  C) (Temporal)   Resp 16   Ht 1.68 m (5' 6.14\")   Wt 87.5 kg (193 lb)   SpO2 100%   BMI 31.02 kg/m    Body mass index is 31.02 kg/m .  Physical Exam   GENERAL: alert and no distress   (female): left labia with crusted painful slightly erythematous lesions. Otherwise normal female external genitalia, normal urethral meatus, normal vaginal mucosa  MS: no gross musculoskeletal defects noted  PSYCH: mentation appears normal, affect normal/bright          Signed Electronically by: SHAYLA Rahman CNP    "

## 2024-10-08 ENCOUNTER — OFFICE VISIT (OUTPATIENT)
Dept: PHYSICAL MEDICINE AND REHAB | Facility: CLINIC | Age: 34
End: 2024-10-08
Payer: COMMERCIAL

## 2024-10-08 VITALS — DIASTOLIC BLOOD PRESSURE: 76 MMHG | SYSTOLIC BLOOD PRESSURE: 113 MMHG | OXYGEN SATURATION: 100 % | HEART RATE: 90 BPM

## 2024-10-08 DIAGNOSIS — G43.719 INTRACTABLE CHRONIC MIGRAINE WITHOUT AURA AND WITHOUT STATUS MIGRAINOSUS: Primary | ICD-10-CM

## 2024-10-08 LAB
HSV1 DNA SPEC QL NAA+PROBE: NOT DETECTED
HSV2 DNA SPEC QL NAA+PROBE: NOT DETECTED

## 2024-10-08 PROCEDURE — 95874 GUIDE NERV DESTR NEEDLE EMG: CPT | Mod: GC | Performed by: PHYSICAL MEDICINE & REHABILITATION

## 2024-10-08 PROCEDURE — 64615 CHEMODENERV MUSC MIGRAINE: CPT | Mod: GC | Performed by: PHYSICAL MEDICINE & REHABILITATION

## 2024-10-08 RX ORDER — MUPIROCIN 20 MG/G
OINTMENT TOPICAL 3 TIMES DAILY
Qty: 1 G | Refills: 0 | Status: SHIPPED | OUTPATIENT
Start: 2024-10-08 | End: 2024-10-15

## 2024-10-08 NOTE — LETTER
10/8/2024       RE: Meredith Mello  [address] [place] [address]     Dear Colleague,    Thank you for referring your patient, Meredith Mello, to the [facility]IT[facility] [place] at [facility]. Please see a copy of my visit note below.    MIGRAINE HEADACHE NEUROTOXN CLINIC NOTE      ATTENDING'S NOTE   Patient seen and examined agree with the note below   I aas present throughout the procedure     Candi Sandoval MD, [facility]   Department of Rehabilitation       PHYSICAL EXAM:  She does not have any headache now.         HPI:  Meredith Mello is a 34 year old female who complains of headaches since childhood, worsening in the past 3 years.  Last seen 7/16/24 where she received 155 units of Botox.     Current Headache Pattern:                   Frequency (How many headache days per month?):  2 per week                 Duration of Headache:  2-3 days                 Aura: no aura                 Associated Symptoms:  nausea, light sensitivity, sound sensitivity, and blocked nostril, ear pain     Migraine Complications: no                                        Description of Headache Pain & Location:  throbbing or dull pain,  left eye and left temporal area                             Level of Pain during usual or 'typical' headache:  4/10                             Level of Pain during the worst headache:  9-10/10     Do headaches interfere with or prevent usual activities or diminish patient's productivity at home or work?  Yes - when it is severe     Non-Pharmacologic Treatments Tried:  (such as food trigger elimination diets, biofeedback, relaxation, physical therapy, chiropractic, etc.)  She has tried running (made it worst), heat also made it worst, cold packs not very helpful, feet in hot water with cold pack in neck not helpful, yoga and park-chi did not help either. She has not tried daily specific stretches  for migraine  Wrapping head with a pillow and trying to sleep     Medications Tried:    Rescue Meds: rimegepant  Nurtec  NSAIDs: Ibuprofen   Triptan's: Maxalt / Maxalt MLT (Rizatriptan) PO  Adjunctive Therapy: no  Prophylactic Drug Treatment: topiramate, amitriptilinemk    She denies a history of recent head injury.       Patient denies new medical diagnoses, illnesses, hospitalizations, emergency room visits, and injuries since the previous injection with botulinum neurotoxin.        RESPONSE TO PREVIOUS TREATMENT:  This will be her third  set of injections. She states that overall she continues to get relief, her migraines are still present during her injection cycle but states frequency and intensity have decreased since starting. She states that she will continue to trial these injections for a few more sessions prior to making a decision if they are significantly helping her. Of note, today she reports she has a migraine that is left sided behind her eye.        BOTULINUM NEUROTOXIN INJECTION PROCEDURES:  VERIFICATION OF PATIENT IDENTIFICATION AND PROCEDURE       Initials   Patient Name fi   Patient  fi   Procedure Verified by: fi      Prior to the start of the procedure and with procedural staff participation, I verbally confirmed the patient s identity using two indicators, relevant allergies, that the procedure was appropriate and matched the consent or emergent situation, and that the correct equipment/implants were available. Immediately prior to starting the procedure I conducted the Time Out with the procedural staff and re-confirmed the patient s name, procedure, and site/side. (The Joint Commission universal protocol was followed.)  Yes     Sedation (Moderate or Deep): None     INDICATIONS FOR PROCEDURES:  Meredith Mello is a 34 year old patient with chronic migraine headaches associated with cervicogenic and oromandibular  components.      Her baseline symptoms have been recalcitrant to oral  medications and conservative therapy. Meredith is here today for injection with Botox for the first time     Above assessments performed by:  Patient seen and evaluated with attending physician Dr. Sandoval. The attending provider was present for the entire procedure documented.      Jose Angel Palma DO (PM&R PGY-4 Resident)  The attending provider was present for the entire procedure documented below.         GOAL OF PROCEDURE:  The goal of this procedure is to decrease pain  and enhance functional independence.     TOTAL DOSE ADMINISTERED:  Dose Administered:  155 units  Botox (Botulinum Toxin Type A)       2:1 Dilution      Diluent Used:  Preservative Free Normal Saline  Total Volume of Diluent Used:  4 ml  Lot # see MAR for details   NDC #: Botox 100u ([id number])     Medication guide was offered to patient and was declined.     CONSENT:  The risks, benefits, and treatment options were discussed with Meredith Mello and she agreed to proceed.     Written consent was obtained by .      EQUIPMENT USED:  Needle-25mm stimulating/recording  EMG  30 gauge needle     SKIN PREPARATION:  Skin preparation was performed using an alcohol wipe.     GUIDANCE DESCRIPTION:  Electro-myographic guidance was necessary throughout the posterior neck portion to accurately identify all areas of spastic muscles while avoiding injection of non-spastic muscles, neighboring nerves and nearby vascular structures.      AREA/MUSCLE INJECTED:    1 & 2. SHOULDER GIRDLE & NECK MUSCLES: 50 units Botox = Total Dose, 2:1 Dilution      Muscle Injected  Units administered  Number of sites    Right lateral upper Trapezius 15 units 3   Left lateral upper Trapezius 15 units 3   Right Cervical Paraspinals  10 units 2   Left Cervical Paraspinals  10 units 2   Right Occipitalis 15 unuts 3   Left Occipitalis 15 units 3   Right Temporalis 20 unuts 4   Left Temporalis 20 units 4   Right Frontalis 10 units 2   Left Frontalis 10 units 2   Right   5  units 2   Right   5 units 2   Procerus  5 units 1        Total dose administered  155    Unavoidable waste 45    Total dose Billed  200               RESPONSE TO PROCEDURE:  Meredith Mello tolerated the procedure well and there were no immediate complications.   She was allowed to recover for an appropriate period of time and was discharged home in stable condition.     FOLLOW UP:  Patient tolerated injections. She felt a little lightheaded at the end of procedure, rested with a cold pack on her forehead and was able to leave clinic with no issues.  Based on the patient's previous response to this therapy, Meredith Mello was rescheduled for the next series of injections in 12 weeks.     PLAN (Medication Changes, Therapy Orders, Work or Disability Issues, etc.): Patient will continue to monitor response to today's injections.       Patient seen and discussed with my attending Dr Lori Palma, DO  PGY-4 Physical Medicine & Rehabilitation      Again, thank you for allowing me to participate in the care of your patient.      Sincerely,    Candi Sandoval MD

## 2024-11-03 SDOH — HEALTH STABILITY: PHYSICAL HEALTH: ON AVERAGE, HOW MANY MINUTES DO YOU ENGAGE IN EXERCISE AT THIS LEVEL?: 20 MIN

## 2024-11-03 SDOH — HEALTH STABILITY: PHYSICAL HEALTH: ON AVERAGE, HOW MANY DAYS PER WEEK DO YOU ENGAGE IN MODERATE TO STRENUOUS EXERCISE (LIKE A BRISK WALK)?: 2 DAYS

## 2024-11-03 ASSESSMENT — SOCIAL DETERMINANTS OF HEALTH (SDOH): HOW OFTEN DO YOU GET TOGETHER WITH FRIENDS OR RELATIVES?: TWICE A WEEK

## 2024-11-06 ENCOUNTER — OFFICE VISIT (OUTPATIENT)
Dept: FAMILY MEDICINE | Facility: CLINIC | Age: 34
End: 2024-11-06
Payer: COMMERCIAL

## 2024-11-06 VITALS
DIASTOLIC BLOOD PRESSURE: 74 MMHG | TEMPERATURE: 98.1 F | WEIGHT: 196.3 LBS | SYSTOLIC BLOOD PRESSURE: 110 MMHG | BODY MASS INDEX: 31.55 KG/M2 | OXYGEN SATURATION: 100 % | HEART RATE: 73 BPM | RESPIRATION RATE: 19 BRPM | HEIGHT: 66 IN

## 2024-11-06 DIAGNOSIS — R11.0 NAUSEA: ICD-10-CM

## 2024-11-06 DIAGNOSIS — F41.1 GAD (GENERALIZED ANXIETY DISORDER): ICD-10-CM

## 2024-11-06 DIAGNOSIS — G43.709 CHRONIC MIGRAINE WITHOUT AURA WITHOUT STATUS MIGRAINOSUS, NOT INTRACTABLE: ICD-10-CM

## 2024-11-06 DIAGNOSIS — Z00.00 ROUTINE GENERAL MEDICAL EXAMINATION AT A HEALTH CARE FACILITY: Primary | ICD-10-CM

## 2024-11-06 LAB
ERYTHROCYTE [DISTWIDTH] IN BLOOD BY AUTOMATED COUNT: 12.9 % (ref 10–15)
HCT VFR BLD AUTO: 41.6 % (ref 35–47)
HGB BLD-MCNC: 13.5 G/DL (ref 11.7–15.7)
MCH RBC QN AUTO: 28.7 PG (ref 26.5–33)
MCHC RBC AUTO-ENTMCNC: 32.5 G/DL (ref 31.5–36.5)
MCV RBC AUTO: 89 FL (ref 78–100)
PLATELET # BLD AUTO: 289 10E3/UL (ref 150–450)
RBC # BLD AUTO: 4.7 10E6/UL (ref 3.8–5.2)
WBC # BLD AUTO: 6.7 10E3/UL (ref 4–11)

## 2024-11-06 PROCEDURE — 99214 OFFICE O/P EST MOD 30 MIN: CPT | Mod: 25 | Performed by: NURSE PRACTITIONER

## 2024-11-06 PROCEDURE — 80053 COMPREHEN METABOLIC PANEL: CPT | Performed by: NURSE PRACTITIONER

## 2024-11-06 PROCEDURE — 99395 PREV VISIT EST AGE 18-39: CPT | Mod: 25 | Performed by: NURSE PRACTITIONER

## 2024-11-06 PROCEDURE — 85027 COMPLETE CBC AUTOMATED: CPT | Performed by: NURSE PRACTITIONER

## 2024-11-06 PROCEDURE — 36415 COLL VENOUS BLD VENIPUNCTURE: CPT | Performed by: NURSE PRACTITIONER

## 2024-11-06 RX ORDER — ONDANSETRON 4 MG/1
4 TABLET, ORALLY DISINTEGRATING ORAL EVERY 8 HOURS PRN
Qty: 18 TABLET | Refills: 3 | Status: SHIPPED | OUTPATIENT
Start: 2024-11-06

## 2024-11-06 RX ORDER — TOPIRAMATE 50 MG/1
50 TABLET, FILM COATED ORAL DAILY
Qty: 90 TABLET | Refills: 4 | Status: SHIPPED | OUTPATIENT
Start: 2024-11-06

## 2024-11-06 RX ORDER — ZOLMITRIPTAN 5 MG/1
5 TABLET, ORALLY DISINTEGRATING ORAL
Qty: 9 TABLET | Refills: 12 | Status: SHIPPED | OUTPATIENT
Start: 2024-11-06

## 2024-11-06 RX ORDER — ESCITALOPRAM OXALATE 10 MG/1
10 TABLET ORAL DAILY
Qty: 90 TABLET | Refills: 3 | Status: SHIPPED | OUTPATIENT
Start: 2024-11-06

## 2024-11-06 ASSESSMENT — PAIN SCALES - GENERAL: PAINLEVEL_OUTOF10: NO PAIN (0)

## 2024-11-06 NOTE — PROGRESS NOTES
"Preventive Care Visit  [facility]  Didi Harvey, NP, Nurse Practitioner - Family  Nov 6, 2024      Assessment & Plan     (Z00.00) Routine general medical examination at a health care facility  (primary encounter diagnosis)  Comment:   Plan:     (G43.709) Chronic migraine without aura without status migrainosus, not intractable  Comment: not controlled  Plan: rimegepant (NURTEC) 75 MG ODT tablet,         ZOLMitriptan (ZOMIG-ZMT) 5 MG ODT,         amitriptyline (ELAVIL) 25 MG tablet, topiramate        (TOPAMAX) 50 MG tablet        Will change Nurtec to preventive dosing and change rizatriptan to zolmitriptan for abortive therapy.      (F41.1) SHERRI (generalized anxiety disorder)  Comment: stable  Plan: escitalopram (LEXAPRO) 10 MG tablet        The current medical regimen is effective;  continue present plan and medications.     (R11.0) Nausea  Comment:   Plan: ondansetron (ZOFRAN ODT) 4 MG ODT tab,         omeprazole (PRILOSEC) 20 MG DR capsule, CBC         with platelets, Comprehensive metabolic panel         (BMP + Alb, Alk Phos, ALT, AST, Total. Bili,         TP)        Will have her take Omeprazole for a month and also have her track symptoms.  If symptoms are not improving, will refer to GI.             BMI  Estimated body mass index is 31.68 kg/m  as calculated from the following:    Height as of this encounter: 1.676 m (5' 6\").    Weight as of this encounter: 89 kg (196 lb 4.8 oz).       Counseling  Appropriate preventive services were addressed with this patient via screening, questionnaire, or discussion as appropriate for fall prevention, nutrition, physical activity, Tobacco-use cessation, social engagement, weight loss and cognition.  Checklist reviewing preventive services available has been given to the patient.  Reviewed patient's diet, addressing concerns and/or questions.   She is at risk for lack of exercise and has been provided with information to increase physical " activity for the benefit of her well-being.   She is at risk for psychosocial distress and has been provided with information to reduce risk.           Payal Harper is a 34 year old, presenting for the following:  Physical        11/6/2024     3:43 PM   Additional Questions   Roomed by Shania CARVER  Migraines continue to be problematic.  She is unsure if Botox is worthwhile.  She gets migraines less frequently, but when she gets them they are worse.  She is having some nausea and vomiting with them, rizatriptan and nurtec do not seem to work well enough.  She is on Amitriptyline and Topiramate for prevention.      She does have random intermittent episodes of nausea, even without migraines.  She has not noticed any pattern related to aggravating or alleviating factors.  She denies any abdominal pain, change in bowel habits, melena or hematochezia.    Her mood is stable.              Health Care Directive  Patient does not have a Health Care Directive: Discussed advance care planning with patient; information given to patient to review.      11/3/2024   General Health   How would you rate your overall physical health? Good   Feel stress (tense, anxious, or unable to sleep) Only a little      (!) STRESS CONCERN      11/3/2024   Nutrition   Three or more servings of calcium each day? (!) NO   Diet: Regular (no restrictions)   How many servings of fruit and vegetables per day? (!) 2-3   How many sweetened beverages each day? 0-1            11/3/2024   Exercise   Days per week of moderate/strenous exercise 2 days   Average minutes spent exercising at this level 20 min      (!) EXERCISE CONCERN      11/3/2024   Social Factors   Frequency of gathering with friends or relatives Twice a week   Worry food won't last until get money to buy more No   Food not last or not have enough money for food? No   Do you have housing? (Housing is defined as stable permanent housing and does not include staying ouside in a  "car, in a tent, in an abandoned building, in an overnight shelter, or couch-surfing.) Yes   Are you worried about losing your housing? No   Lack of transportation? No   Unable to get utilities (heat,electricity)? No            11/3/2024   Dental   Dentist two times every year? Yes            11/3/2024   TB Screening   Were you born outside of the [place]? No              Today's PHQ-2 Score:       2/15/2024     7:57 AM   PHQ-2 ( 1999 [employer])   Q1: Little interest or pleasure in doing things 0    Q2: Feeling down, depressed or hopeless 0    PHQ-2 Score 0   Q1: Little interest or pleasure in doing things Not at all   Q2: Feeling down, depressed or hopeless Not at all   PHQ-2 Score 0       Patient-reported         11/3/2024   Substance Use   Alcohol more than 3/day or more than 7/wk Not Applicable   Do you use any other substances recreationally? (!) CANNABIS PRODUCTS        Social History     Tobacco Use    Smoking status: Never     Passive exposure: Never    Smokeless tobacco: Never   Vaping Use    Vaping status: Never Used   Substance Use Topics    Alcohol use: Yes     Comment: occasional    Drug use: Yes     Types: Marijuana                  11/3/2024   STI Screening   New sexual partner(s) since last STI/HIV test? No        History of abnormal Pap smear: No - age 30- 64 PAP with HPV every 5 years recommended        Latest Ref Rng & Units 9/14/2022    12:27 PM   PAP / HPV   PAP  Negative for Intraepithelial Lesion or Malignancy (NILM)    HPV 16 DNA Negative Negative    HPV 18 DNA Negative Negative    Other HR HPV Negative Negative            11/3/2024   Contraception/Family Planning   Questions about contraception or family planning No           Reviewed and updated as needed this visit by Provider                             Objective    Exam  /74 (BP Location: Right arm, Patient Position: Sitting, Cuff Size: Adult Regular)   Pulse 73   Temp 98.1  F (36.7  C) (Temporal)   Resp 19   Ht 1.676 m (5' 6\")   Wt 89 " "kg (196 lb 4.8 oz)   SpO2 100%   BMI 31.68 kg/m     Estimated body mass index is 31.68 kg/m  as calculated from the following:    Height as of this encounter: 1.676 m (5' 6\").    Weight as of this encounter: 89 kg (196 lb 4.8 oz).    Physical Exam  GENERAL: alert and no distress  EYES: Eyes grossly normal to inspection, PERRL and conjunctivae and sclerae normal  HENT: ear canals and TM's normal, nose and mouth without ulcers or lesions  NECK: no adenopathy, no asymmetry, masses, or scars  RESP: lungs clear to auscultation - no rales, rhonchi or wheezes  CV: regular rate and rhythm, normal S1 S2, no S3 or S4, no murmur, click or rub, no peripheral edema  ABDOMEN: soft, nontender, no hepatosplenomegaly, no masses and bowel sounds normal  MS: no gross musculoskeletal defects noted, no edema  SKIN: no suspicious lesions or rashes  NEURO: Normal strength and tone, mentation intact and speech normal  PSYCH: mentation appears normal, affect normal/bright        Signed Electronically by: Didi Harvey NP    "

## 2024-11-06 NOTE — PATIENT INSTRUCTIONS
Patient Education   Preventive Care Advice   This is general advice given by our system to help you stay healthy. However, your care team may have specific advice just for you. Please talk to your care team about your preventive care needs.  Nutrition  Eat 5 or more servings of fruits and vegetables each day.  Try wheat bread, brown rice and whole grain pasta (instead of white bread, rice, and pasta).  Get enough calcium and vitamin D. Check the label on foods and aim for 100% of the RDA (recommended daily allowance).  Lifestyle  Exercise at least 150 minutes each week  (30 minutes a day, 5 days a week).  Do muscle strengthening activities 2 days a week. These help control your weight and prevent disease.  No smoking.  Wear sunscreen to prevent skin cancer.  Have a dental exam and cleaning every 6 months.  Yearly exams  See your health care team every year to talk about:  Any changes in your health.  Any medicines your care team has prescribed.  Preventive care, family planning, and ways to prevent chronic diseases.  Shots (vaccines)   HPV shots (up to age 26), if you've never had them before.  Hepatitis B shots (up to age 59), if you've never had them before.  COVID-19 shot: Get this shot when it's due.  Flu shot: Get a flu shot every year.  Tetanus shot: Get a tetanus shot every 10 years.  Pneumococcal, hepatitis A, and RSV shots: Ask your care team if you need these based on your risk.  Shingles shot (for age 50 and up)  General health tests  Diabetes screening:  Starting at age 35, Get screened for diabetes at least every 3 years.  If you are younger than age 35, ask your care team if you should be screened for diabetes.  Cholesterol test: At age 39, start having a cholesterol test every 5 years, or more often if advised.  Bone density scan (DEXA): At age 50, ask your care team if you should have this scan for osteoporosis (brittle bones).  Hepatitis C: Get tested at least once in your life.  STIs (sexually  transmitted infections)  Before age 24: Ask your care team if you should be screened for STIs.  After age 24: Get screened for STIs if you're at risk. You are at risk for STIs (including HIV) if:  You are sexually active with more than one person.  You don't use condoms every time.  You or a partner was diagnosed with a sexually transmitted infection.  If you are at risk for HIV, ask about PrEP medicine to prevent HIV.  Get tested for HIV at least once in your life, whether you are at risk for HIV or not.  Cancer screening tests  Cervical cancer screening: If you have a cervix, begin getting regular cervical cancer screening tests starting at age 21.  Breast cancer scan (mammogram): If you've ever had breasts, begin having regular mammograms starting at age 40. This is a scan to check for breast cancer.  Colon cancer screening: It is important to start screening for colon cancer at age 45.  Have a colonoscopy test every 10 years (or more often if you're at risk) Or, ask your provider about stool tests like a FIT test every year or Cologuard test every 3 years.  To learn more about your testing options, visit:   .  For help making a decision, visit:   https://bit.ly/hz24085.  Prostate cancer screening test: If you have a prostate, ask your care team if a prostate cancer screening test (PSA) at age 55 is right for you.  Lung cancer screening: If you are a current or former smoker ages 50 to 80, ask your care team if ongoing lung cancer screenings are right for you.  For informational purposes only. Not to replace the advice of your health care provider. Copyright   2023 [facility]. All rights reserved. Clinically reviewed by the [facility] Transitions Program. [employer] 635998 - REV 01/24.  Substance Use Disorder: Care Instructions  Overview     You can improve your life and health by stopping your use of alcohol or drugs. When you don't drink or use drugs, you may feel and sleep better. You may  get along better with your family, friends, and coworkers. There are medicines and programs that can help with substance use disorder.  How can you care for yourself at home?  Here are some ways to help you stay sober and prevent relapse.  If you have been given medicine to help keep you sober or reduce your cravings, be sure to take it exactly as prescribed.  Talk to your doctor about programs that can help you stop using drugs or drinking alcohol.  Do not keep alcohol or drugs in your home.  Plan ahead. Think about what you'll say if other people ask you to drink or use drugs. Try not to spend time with people who drink or use drugs.  Use the time and money spent on drinking or drugs to do something that's important to you.  Preventing a relapse  Have a plan to deal with relapse. Learn to recognize changes in your thinking that lead you to drink or use drugs. Get help before you start to drink or use drugs again.  Try to stay away from situations, friends, or places that may lead you to drink or use drugs.  If you feel the need to drink alcohol or use drugs again, seek help right away. Call a trusted friend or family member. Some people get support from organizations such as Narcotics Anonymous or [employer] or from treatment facilities.  If you relapse, get help as soon as you can. Some people make a plan with another person that outlines what they want that person to do for them if they relapse. The plan usually includes how to handle the relapse and who to notify in case of relapse.  Don't give up. Remember that a relapse doesn't mean that you have failed. Use the experience to learn the triggers that lead you to drink or use drugs. Then quit again. Recovery is a lifelong process. Many people have several relapses before they are able to quit for good.  Follow-up care is a key part of your treatment and safety. Be sure to make and go to all appointments, and call your doctor if you are having problems. It's  "also a good idea to know your test results and keep a list of the medicines you take.  When should you call for help?   Call 911  anytime you think you may need emergency care. For example, call if you or someone else:    Has overdosed or has withdrawal signs. Be sure to tell the emergency workers that you are or someone else is using or trying to quit using drugs. Overdose or withdrawal signs may include:  Losing consciousness.  Seizure.  Seeing or hearing things that aren't there (hallucinations).     Is thinking or talking about suicide or harming others.   Where to get help 24 hours a day, 7 days a week   If you or someone you know talks about suicide, self-harm, a mental health crisis, a substance use crisis, or any other kind of emotional distress, get help right away. You can:    Call the Suicide and Crisis Lifeline at 988.     Call 3-077-331-TALK (1-216.508.1230).     Text HOME to 893885 to access the Crisis Text Line.   Consider saving these numbers in your phone.  Go to [employer] for more information or to chat online.  Call your doctor now or seek immediate medical care if:    You are having withdrawal symptoms. These may include nausea or vomiting, sweating, shakiness, and anxiety.   Watch closely for changes in your health, and be sure to contact your doctor if:    You have a relapse.     You need more help or support to stop.   Where can you learn more?  Go to https://www.[employer].net/patiented  Enter H573 in the search box to learn more about \"Substance Use Disorder: Care Instructions.\"  Current as of: November 15, 2023  Content Version: 14.2 2024 [facility].   Care instructions adapted under license by your healthcare professional. If you have questions about a medical condition or this instruction, always ask your healthcare professional. [employer] disclaims any warranty or liability for your use of this information.       "

## 2024-11-07 LAB
ALBUMIN SERPL BCG-MCNC: 4.6 G/DL (ref 3.5–5.2)
ALP SERPL-CCNC: 57 U/L (ref 40–150)
ALT SERPL W P-5'-P-CCNC: 10 U/L (ref 0–50)
ANION GAP SERPL CALCULATED.3IONS-SCNC: 14 MMOL/L (ref 7–15)
AST SERPL W P-5'-P-CCNC: 20 U/L (ref 0–45)
BILIRUB SERPL-MCNC: <0.2 MG/DL
BUN SERPL-MCNC: 9.1 MG/DL (ref 6–20)
CALCIUM SERPL-MCNC: 9.3 MG/DL (ref 8.8–10.4)
CHLORIDE SERPL-SCNC: 105 MMOL/L (ref 98–107)
CREAT SERPL-MCNC: 0.72 MG/DL (ref 0.51–0.95)
EGFRCR SERPLBLD CKD-EPI 2021: >90 ML/MIN/1.73M2
GLUCOSE SERPL-MCNC: 91 MG/DL (ref 70–99)
HCO3 SERPL-SCNC: 20 MMOL/L (ref 22–29)
POTASSIUM SERPL-SCNC: 4.4 MMOL/L (ref 3.4–5.3)
PROT SERPL-MCNC: 7.5 G/DL (ref 6.4–8.3)
SODIUM SERPL-SCNC: 139 MMOL/L (ref 135–145)

## 2025-01-20 ENCOUNTER — THERAPY VISIT (OUTPATIENT)
Dept: PHYSICAL THERAPY | Facility: CLINIC | Age: 35
End: 2025-01-20
Payer: COMMERCIAL

## 2025-01-20 DIAGNOSIS — M54.2 CERVICALGIA: Primary | ICD-10-CM

## 2025-01-20 DIAGNOSIS — R29.898 DECREASED ROM OF NECK: ICD-10-CM

## 2025-01-20 DIAGNOSIS — S13.9XXA SPRAIN OF CERVICAL NECK, INITIAL ENCOUNTER: ICD-10-CM

## 2025-01-20 PROCEDURE — 97140 MANUAL THERAPY 1/> REGIONS: CPT | Mod: GP

## 2025-01-20 PROCEDURE — 97161 PT EVAL LOW COMPLEX 20 MIN: CPT | Mod: GP

## 2025-01-20 PROCEDURE — 97110 THERAPEUTIC EXERCISES: CPT | Mod: GP

## 2025-01-20 PROCEDURE — 97530 THERAPEUTIC ACTIVITIES: CPT | Mod: GP

## 2025-01-20 ASSESSMENT — ACTIVITIES OF DAILY LIVING (ADL)
WHEN_LYING_ON_THE_INVOLVED_SIDE: 3
PLACING_AN_OBJECT_ON_A_HIGH_SHELF: 2
TOUCHING_THE_BACK_OF_YOUR_NECK: 0
PUSHING_WITH_THE_INVOLVED_ARM: 0
PUTTING_ON_AN_UNDERSHIRT_OR_A_PULLOVER_SWEATER: 3
REMOVING_SOMETHING_FROM_YOUR_BACK_POCKET: 1
REACHING_FOR_SOMETHING_ON_A_HIGH_SHELF: 0
AT_ITS_WORST?: 9
PLEASE_INDICATE_YOR_PRIMARY_REASON_FOR_REFERRAL_TO_THERAPY:: SHOULDER
PUTTING_ON_YOUR_PANTS: 3

## 2025-01-20 NOTE — PROGRESS NOTES
PHYSICAL THERAPY EVALUATION  Type of Visit: Evaluation    Patient presents with signs and symptoms consistent with R sided neck pain secondary to sleeping on a different mattress that was firmer than her usual one. Patient demonstrates impairments including limited C/S ROM, increased muscle spasms in her neck, and tenderness to palpation of the neck muscles on the R side. Patient with functional limitations including impaired driving, decreased tolerance to usual daily activities, and decreased tolerance to work-related activities. Patient would benefit from skilled PT to progress and improve impairments and concerns.    Subjective   Patient reports that she had slept on her sister's bed last Saturday which caused her to wake up with painful spasms in her neck and shoulder. She cannot turn her head to the R right now without a painful spasm. Massage hasn't worked and aggravated symptoms. Ice has felt good so far.        Presenting condition or subjective complaint: Painful spasms in right shoulder/neck after sleeping  Date of onset: 01/20/25    Relevant medical history:     Past Medical History:   Diagnosis Date    SHERRI (generalized anxiety disorder)      Dates & types of surgery:    Past Surgical History:   Procedure Laterality Date    NO HISTORY OF SURGERY      TONSILLECTOMY Bilateral 6/12/2018    Procedure: TONSILLECTOMY;  Bilateral tonsillectomy;  Surgeon: Alex Valentino MD;  Location: [facility] OR     Prior diagnostic imaging/testing results:     None, PT direct access  Prior therapy history for the same diagnosis, illness or injury: No      Living Environment  Social support: With a significant other or spouse   Type of home: House   Stairs to enter the home: Yes 4 Is there a railing: Yes     Ramp: No   Stairs inside the home: Yes 12 Is there a railing: Yes     Help at home: None  Equipment owned:       Employment: Yes Music Therapist  Hobbies/Interests: Cooking, reading, playing piano, walking    Patient goals  for therapy: Full use of right arm and shoulder and full rotation of head and neck    Pain assessment: Pain present     Objective   Pain Location: R neck and R shoulder  Pain Quality: Muscle spasms and sharp pain, soreness on R side of neck  Pain Frequency: Constant  Pain is Worst: Got worse throughout the day  Pain is Exacerbated By: Turning head to the R, massage, doing hair, bending trunk forward  Pain is Relieved By: Ice, maybe Ibuprofen  Pain Progression: Gotten worse      CERVICAL SPINE EVALUATION    POSTURE:   Sitting Posture: Pt sitting with very upright rigid posture due to severe muscle guarding in the neck and shoulders.    ROM: Shoulder AROM unless noted - WFL no inc of P just has to be mindful of movements  (Degrees) Left AROM Right AROM    Cervical Flexion 18 (muscle spasm)    Cervical Extension 19 (muscle spasm)    Cervical Side bend 28 0    Cervical Rotation 50 16P and spasm    Cervical Protrusion     Cervical Retraction     Thoracic Flexion     Thoracic Extension     Thoracic Rotation       Left AROM Left PROM Right AROM Right PROM   Shoulder Flexion       Shoulder Extension       Shoulder Abduction       Shoulder Adduction       Shoulder IR   T3-4 with slight inc P    Shoulder ER       Shoulder Horiz Abduction       Shoulder Horiz Adduction         MYOTOMES: shoulder flexion 4+ bilaterally with neck pressure with RUE; all other shoulder MMT 5/5 without P    FLEXIBILITY: Increased tightness of R UT and LS   PALPATION: Increased TTP of R UT/LS    Special Tests: NT today   Left Right   Alar Ligament    Cervical Flexion-Rotation     Cervical Rot/Lateral Flex     Compression     Distraction     Spurling s     Thoracic Outlet Screen (Jayson Roberts)     Transverse Ligament     Vertebral Artery         Assessment & Plan   CLINICAL IMPRESSIONS  Medical Diagnosis: R shoulder and neck pain    Treatment Diagnosis: R shoulder and neck pain, C/S sprain   Impression/Assessment: Patient is a 34 year old female with  R sided neck pain complaints.  The following significant findings have been identified: Pain, Decreased ROM/flexibility, Decreased joint mobility, Decreased activity tolerance, and Impaired posture. These impairments interfere with their ability to perform self care tasks, work tasks, recreational activities, household chores, and driving  as compared to previous level of function.     Clinical Decision Making (Complexity):  Clinical Presentation: Stable/Uncomplicated  Clinical Presentation Rationale: based on medical and personal factors listed in PT evaluation  Clinical Decision Making (Complexity): Low complexity    PLAN OF CARE  Treatment Interventions:  Interventions: Manual Therapy, Neuromuscular Re-education, Therapeutic Activity, Therapeutic Exercise, Self-Care/Home Management    Long Term Goals     PT Goal 1  Goal Identifier: C/S AROM  Goal Description: Pt will be able to restore her full AROM of her cervical spine KELVIN to within 5 degrees side to side in order to return to her PLOF without pain.  Goal Progress: Baseline: flexion 18P, extension 19P, SB L 28, SB R 0P, Rot L 50, Rot R 16  Target Date: 02/19/25  PT Goal 2  Goal Identifier: Work  Goal Description: Pt will be able to work the full day without an increase in neck spasms for at least three days in a row in order to return to pain-free PLOF  Goal Progress: Baseline: Has not tried to work yet, but predicts that she will have increased neck symptoms throughout the day  Target Date: 02/19/25      Frequency of Treatment: 1 time per week  Duration of Treatment: 8 weeks    Recommended Referrals to Other Professionals:  possible referral to spine MD if no improvements over next few days    Risks and benefits of evaluation/treatment have been explained.   Patient/Family/caregiver agrees with Plan of Care.     Evaluation Time:          Signing Clinician: MANDA JOHNSON, PT

## 2025-01-27 ENCOUNTER — OFFICE VISIT (OUTPATIENT)
Dept: MIDWIFE SERVICES | Facility: CLINIC | Age: 35
End: 2025-01-27
Payer: COMMERCIAL

## 2025-01-27 VITALS — BODY MASS INDEX: 32.28 KG/M2 | SYSTOLIC BLOOD PRESSURE: 120 MMHG | WEIGHT: 200 LBS | DIASTOLIC BLOOD PRESSURE: 70 MMHG

## 2025-01-27 DIAGNOSIS — Z30.432 ENCOUNTER FOR REMOVAL OF INTRAUTERINE CONTRACEPTIVE DEVICE: Primary | ICD-10-CM

## 2025-01-27 PROCEDURE — 58301 REMOVE INTRAUTERINE DEVICE: CPT | Performed by: ADVANCED PRACTICE MIDWIFE

## 2025-01-27 NOTE — PROGRESS NOTES
IUD Removal:  SUBJECTIVE:    Is a pregnancy test required: No.  Was a consent obtained?  Yes    Meredith Mello is a 34 year old female,, No LMP recorded. (Menstrual status: IUD). who presents today for IUD removal.  She  she has had migraines which she hopes will improve without the IUD . She requests removal of the IUD because she desires to conceive    Today's PHQ-2 Score:       2025     3:27 PM   PHQ-2 (  [employer])   Q1: Little interest or pleasure in doing things 0   Q2: Feeling down, depressed or hopeless 1   PHQ-2 Score 1    Q1: Little interest or pleasure in doing things Not at all   Q2: Feeling down, depressed or hopeless Several days   PHQ-2 Score 1       Patient-reported         PROCEDURE:    A speculum exam was performed and the cervix was visualized. The IUD string was visualized. Using ring forceps, the string  was grasped and the IUD removed intact.    POST PROCEDURE:    The patient tolerated the procedure well. Patient was discharged in stable condition.    Call if bleeding, pain or fever occur., Pregnancy counseling given, including folic acid supplementation 800-1000 mg per day., and Use of condoms/foam discussed.    Zamzam Brown CNM

## 2025-02-25 ENCOUNTER — TELEPHONE (OUTPATIENT)
Dept: PHYSICAL MEDICINE AND REHAB | Facility: CLINIC | Age: 35
End: 2025-02-25
Payer: COMMERCIAL

## 2025-02-25 NOTE — TELEPHONE ENCOUNTER
SITUATION:  Needs appt change due to provider leaving this organization    Next appt date: 4/1/25    DUE NOW    Visit type:  NEUROTOXIN        BACKGROUND:  Reason/ DOI or onset: Meredith Mello is a 34 year old female who complains of headaches since childhood, worsening in the past 3 years.     She receives Botox every 3 months for DX: G43.719 - Intractable chronic migraine without aura and without status migrainosus    Last visit: 10/8/25 - Received Botox 155 units     Orders in process: None    Current Home Meds ordered by PMR provider: None    ASSESSMENT / ACTION:     Rebook 4/1/25 appointment with new provider for Neurotoxin (Botox) visit    Will need new orders entered prior to new appointment     Will need CAM finance review prior to new appointment    REQUEST / RECOMMENDATION:     Please call Meredith Mello to rebook their appointment due to the previous provider is leaving this organization.    The 4/1/25 visit has been canceled, but needs to be rescheduled as follows:    Dr. Olivia at either [facility] [place] or Dr. Becerra at [facility] or [place] location for Neurotoxin (Botox)    If Meredith Mello has any concern or specific questions about their plan of care, tell them that a nurse will call them back to review and send this encounter back to this writer.    Keyona Parson RN on 2/25/2025 at 10:19 AM

## 2025-02-25 NOTE — TELEPHONE ENCOUNTER
Closing this encounter.  All future appointments with Dr. Sandoval or other PMR providers have been canceled.    Keyona Parson RN on 2/25/2025 at 4:06 PM      From: Ab Medina  Sent: 2/25/2025   2:16 PM CST  To: FRANK Whiting,      Spoke with pt regarding rescheduling her Botox injection, and she has decided to discontinue Botox injections. She said she will reach out if need be.    Thanks,  Ab Medina  Clinic Facilitator   Physical Medicine & Rehabilitation  Clinic phone: [phone]  ----- Message -----  From: Keyona Parson RN  Sent: 2/25/2025  10:25 AM CST  To: Ab Medina    ----- Message from Keyona Parson RN sent at 2/25/2025 10:25 AM CST -----  Ab,    This patient of Dr. Sandoval needs her 4/1/25 visit rescheduled with either Dr. Olivia or Dr. Becerra.  She was last seen 10/8/24 and is overdue, so she can be scheduled any date or time from today forward.    Thank you,    Keyona   Not applicable (known HIV negative status in last year)

## 2025-03-31 ENCOUNTER — VIRTUAL VISIT (OUTPATIENT)
Dept: FAMILY MEDICINE | Facility: CLINIC | Age: 35
End: 2025-03-31
Payer: COMMERCIAL

## 2025-03-31 DIAGNOSIS — G44.229 CHRONIC TENSION-TYPE HEADACHE, NOT INTRACTABLE: Primary | ICD-10-CM

## 2025-03-31 PROCEDURE — 1126F AMNT PAIN NOTED NONE PRSNT: CPT | Performed by: NURSE PRACTITIONER

## 2025-03-31 PROCEDURE — 98005 SYNCH AUDIO-VIDEO EST LOW 20: CPT | Performed by: NURSE PRACTITIONER

## 2025-03-31 RX ORDER — CYCLOBENZAPRINE HCL 10 MG
10 TABLET ORAL AT BEDTIME
Qty: 30 TABLET | Refills: 2 | Status: SHIPPED | OUTPATIENT
Start: 2025-03-31

## 2025-03-31 ASSESSMENT — ANXIETY QUESTIONNAIRES
8. IF YOU CHECKED OFF ANY PROBLEMS, HOW DIFFICULT HAVE THESE MADE IT FOR YOU TO DO YOUR WORK, TAKE CARE OF THINGS AT HOME, OR GET ALONG WITH OTHER PEOPLE?: NOT DIFFICULT AT ALL
GAD7 TOTAL SCORE: 2
2. NOT BEING ABLE TO STOP OR CONTROL WORRYING: NOT AT ALL
7. FEELING AFRAID AS IF SOMETHING AWFUL MIGHT HAPPEN: SEVERAL DAYS
GAD7 TOTAL SCORE: 2
4. TROUBLE RELAXING: NOT AT ALL
3. WORRYING TOO MUCH ABOUT DIFFERENT THINGS: SEVERAL DAYS
7. FEELING AFRAID AS IF SOMETHING AWFUL MIGHT HAPPEN: SEVERAL DAYS
1. FEELING NERVOUS, ANXIOUS, OR ON EDGE: NOT AT ALL
IF YOU CHECKED OFF ANY PROBLEMS ON THIS QUESTIONNAIRE, HOW DIFFICULT HAVE THESE PROBLEMS MADE IT FOR YOU TO DO YOUR WORK, TAKE CARE OF THINGS AT HOME, OR GET ALONG WITH OTHER PEOPLE: NOT DIFFICULT AT ALL
GAD7 TOTAL SCORE: 2
6. BECOMING EASILY ANNOYED OR IRRITABLE: NOT AT ALL
5. BEING SO RESTLESS THAT IT IS HARD TO SIT STILL: NOT AT ALL

## 2025-03-31 NOTE — PROGRESS NOTES
"Meredith is a 34 year old who is being evaluated via a billable video visit.    How would you like to obtain your AVS? MyChart  If the video visit is dropped, the invitation should be resent by: Text to cell phone: [phone]  Will anyone else be joining your video visit? No      Assessment & Plan     (G44.229) Chronic tension-type headache, not intractable  (primary encounter diagnosis)  Comment:   Plan: cyclobenzaprine (FLEXERIL) 10 MG tablet,         Physical Therapy  Referral        Will refer to PT and try Flexeril at bedtime for 1-2 weeks.  If symptoms are not improving after a month, will refer to [facility].      The longitudinal plan of care for the diagnosis(es)/condition(s) as documented were addressed during this visit. Due to the added complexity in care, I will continue to support Meredith in the subsequent management and with ongoing continuity of care.        BMI  Estimated body mass index is 32.28 kg/m  as calculated from the following:    Height as of 11/6/24: 1.676 m (5' 6\").    Weight as of 1/27/25: 90.7 kg (200 lb).             Subjective   Meredith is a 34 year old, presenting for the following health issues:  Migraine      3/31/2025     4:30 PM   Additional Questions   Roomed by Merary JACOB     History of Present Illness       Headaches:   Since the patient's last clinic visit, headaches are: no change  The patient is getting headaches:  2-4x weekly  She is not able to do normal daily activities when she has a migraine.  The patient is taking the following rescue/relief medications:  Ibuprofen (Advil, Motrin) and other   Patient states \"The relief is inconsistent\" from the rescue/relief medications.   The patient is taking the following medications to prevent migraines:  Topomax and Amitriptyline  In the past 4 weeks, the patient has gone to an Urgent Care or Emergency Room 0 times times due to headaches.    She eats 2-3 servings of fruits and vegetables daily.She consumes " 0 sweetened beverage(s) daily.She exercises with enough effort to increase her heart rate 20 to 29 minutes per day.  She exercises with enough effort to increase her heart rate 4 days per week.   She is taking medications regularly.      Notes from 11/6/24 visit:  Migraines continue to be problematic.  She is unsure if Botox is worthwhile.  She gets migraines less frequently, but when she gets them they are worse.  She is having some nausea and vomiting with them, rizatriptan and nurtec do not seem to work well enough.  She is on Amitriptyline and Topiramate for prevention.    Will change Nurtec to preventive dosing and change rizatriptan to zolmitriptan for abortive therapy.    She has seen neurology in the past.     She is now getting milder headaches, but getting them most days of the week.  It was difficult to take the Nurtec every other day, so went back to taking it as abortive.  She is waking up with tightness and soreness in her jaw.  She does notice tightness in her traps.  These headaches are more generalized to her whole head.  Her migraines are usually unilateral and behind an eye.  She has been taking Ibuprofen up to twice daily (10-12 days a month).  She has been under more stress since January.  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                     Objective    Vitals - Patient Reported  Pain Score: No Pain (0)        Physical Exam   GENERAL: alert and no distress  EYES: Eyes grossly normal to inspection.  No discharge or erythema, or obvious scleral/conjunctival abnormalities.  RESP: No audible wheeze, cough, or visible cyanosis.    SKIN: Visible skin clear. No significant rash, abnormal pigmentation or lesions.  NEURO: Cranial nerves grossly intact.  Mentation and speech appropriate for age.  PSYCH: Appropriate affect, tone, and pace of words          Video-Visit Details    Type of service:  Video Visit   Originating Location (pt. Location): Home    Distant Location  (provider location):  On-site  Platform used for Video Visit: [employer]  Signed Electronically by: Didi Harvey NP
